# Patient Record
Sex: FEMALE | Race: WHITE | NOT HISPANIC OR LATINO | Employment: UNEMPLOYED | URBAN - NONMETROPOLITAN AREA
[De-identification: names, ages, dates, MRNs, and addresses within clinical notes are randomized per-mention and may not be internally consistent; named-entity substitution may affect disease eponyms.]

---

## 2019-12-27 ENCOUNTER — APPOINTMENT (OUTPATIENT)
Dept: CT IMAGING | Facility: HOSPITAL | Age: 49
End: 2019-12-27

## 2019-12-27 ENCOUNTER — HOSPITAL ENCOUNTER (EMERGENCY)
Facility: HOSPITAL | Age: 49
Discharge: HOME | End: 2019-12-27
Attending: EMERGENCY MEDICINE | Admitting: EMERGENCY MEDICINE

## 2019-12-27 ENCOUNTER — APPOINTMENT (OUTPATIENT)
Dept: GENERAL RADIOLOGY | Facility: HOSPITAL | Age: 49
End: 2019-12-27

## 2019-12-27 VITALS
TEMPERATURE: 98.9 F | OXYGEN SATURATION: 99 % | HEART RATE: 77 BPM | WEIGHT: 135 LBS | DIASTOLIC BLOOD PRESSURE: 87 MMHG | RESPIRATION RATE: 16 BRPM | HEIGHT: 66 IN | SYSTOLIC BLOOD PRESSURE: 134 MMHG | BODY MASS INDEX: 21.69 KG/M2

## 2019-12-27 DIAGNOSIS — I10 ESSENTIAL HYPERTENSION: Primary | ICD-10-CM

## 2019-12-27 LAB
ALBUMIN SERPL-MCNC: 4.37 G/DL (ref 3.5–5.2)
ALBUMIN/GLOB SERPL: 1.5 G/DL
ALP SERPL-CCNC: 88 U/L (ref 39–117)
ALT SERPL W P-5'-P-CCNC: 13 U/L (ref 1–33)
ANION GAP SERPL CALCULATED.3IONS-SCNC: 11.7 MMOL/L (ref 5–15)
AST SERPL-CCNC: 17 U/L (ref 1–32)
BASOPHILS # BLD AUTO: 0.05 10*3/MM3 (ref 0–0.2)
BASOPHILS NFR BLD AUTO: 0.5 % (ref 0–1.5)
BILIRUB SERPL-MCNC: 0.2 MG/DL (ref 0.2–1.2)
BUN BLD-MCNC: 17 MG/DL (ref 6–20)
BUN/CREAT SERPL: 21.3 (ref 7–25)
CALCIUM SPEC-SCNC: 9.5 MG/DL (ref 8.6–10.5)
CHLORIDE SERPL-SCNC: 104 MMOL/L (ref 98–107)
CO2 SERPL-SCNC: 25.3 MMOL/L (ref 22–29)
CREAT BLD-MCNC: 0.8 MG/DL (ref 0.57–1)
DEPRECATED RDW RBC AUTO: 45.1 FL (ref 37–54)
EOSINOPHIL # BLD AUTO: 0.12 10*3/MM3 (ref 0–0.4)
EOSINOPHIL NFR BLD AUTO: 1.1 % (ref 0.3–6.2)
ERYTHROCYTE [DISTWIDTH] IN BLOOD BY AUTOMATED COUNT: 12.5 % (ref 12.3–15.4)
GFR SERPL CREATININE-BSD FRML MDRD: 77 ML/MIN/1.73
GLOBULIN UR ELPH-MCNC: 2.8 GM/DL
GLUCOSE BLD-MCNC: 91 MG/DL (ref 65–99)
HCT VFR BLD AUTO: 44.7 % (ref 34–46.6)
HGB BLD-MCNC: 14.6 G/DL (ref 12–15.9)
IMM GRANULOCYTES # BLD AUTO: 0.04 10*3/MM3 (ref 0–0.05)
IMM GRANULOCYTES NFR BLD AUTO: 0.4 % (ref 0–0.5)
LYMPHOCYTES # BLD AUTO: 2.13 10*3/MM3 (ref 0.7–3.1)
LYMPHOCYTES NFR BLD AUTO: 20.2 % (ref 19.6–45.3)
MAGNESIUM SERPL-MCNC: 1.9 MG/DL (ref 1.6–2.6)
MCH RBC QN AUTO: 32 PG (ref 26.6–33)
MCHC RBC AUTO-ENTMCNC: 32.7 G/DL (ref 31.5–35.7)
MCV RBC AUTO: 98 FL (ref 79–97)
MONOCYTES # BLD AUTO: 0.58 10*3/MM3 (ref 0.1–0.9)
MONOCYTES NFR BLD AUTO: 5.5 % (ref 5–12)
NEUTROPHILS # BLD AUTO: 7.65 10*3/MM3 (ref 1.7–7)
NEUTROPHILS NFR BLD AUTO: 72.3 % (ref 42.7–76)
NRBC BLD AUTO-RTO: 0 /100 WBC (ref 0–0.2)
PLATELET # BLD AUTO: 225 10*3/MM3 (ref 140–450)
PMV BLD AUTO: 9.9 FL (ref 6–12)
POTASSIUM BLD-SCNC: 4.2 MMOL/L (ref 3.5–5.2)
PROT SERPL-MCNC: 7.2 G/DL (ref 6–8.5)
RBC # BLD AUTO: 4.56 10*6/MM3 (ref 3.77–5.28)
SODIUM BLD-SCNC: 141 MMOL/L (ref 136–145)
TROPONIN T SERPL-MCNC: <0.01 NG/ML (ref 0–0.03)
TSH SERPL DL<=0.05 MIU/L-ACNC: 0.81 UIU/ML (ref 0.27–4.2)
WBC NRBC COR # BLD: 10.57 10*3/MM3 (ref 3.4–10.8)

## 2019-12-27 PROCEDURE — 84443 ASSAY THYROID STIM HORMONE: CPT | Performed by: PHYSICIAN ASSISTANT

## 2019-12-27 PROCEDURE — 99284 EMERGENCY DEPT VISIT MOD MDM: CPT

## 2019-12-27 PROCEDURE — 85025 COMPLETE CBC W/AUTO DIFF WBC: CPT | Performed by: PHYSICIAN ASSISTANT

## 2019-12-27 PROCEDURE — 70450 CT HEAD/BRAIN W/O DYE: CPT | Performed by: RADIOLOGY

## 2019-12-27 PROCEDURE — 71045 X-RAY EXAM CHEST 1 VIEW: CPT | Performed by: RADIOLOGY

## 2019-12-27 PROCEDURE — 84484 ASSAY OF TROPONIN QUANT: CPT | Performed by: PHYSICIAN ASSISTANT

## 2019-12-27 PROCEDURE — 25010000002 KETOROLAC TROMETHAMINE PER 15 MG: Performed by: PHYSICIAN ASSISTANT

## 2019-12-27 PROCEDURE — 80053 COMPREHEN METABOLIC PANEL: CPT | Performed by: PHYSICIAN ASSISTANT

## 2019-12-27 PROCEDURE — 83735 ASSAY OF MAGNESIUM: CPT | Performed by: PHYSICIAN ASSISTANT

## 2019-12-27 PROCEDURE — 93005 ELECTROCARDIOGRAM TRACING: CPT | Performed by: PHYSICIAN ASSISTANT

## 2019-12-27 PROCEDURE — 93010 ELECTROCARDIOGRAM REPORT: CPT | Performed by: INTERNAL MEDICINE

## 2019-12-27 PROCEDURE — 71045 X-RAY EXAM CHEST 1 VIEW: CPT

## 2019-12-27 PROCEDURE — 70450 CT HEAD/BRAIN W/O DYE: CPT

## 2019-12-27 PROCEDURE — 96374 THER/PROPH/DIAG INJ IV PUSH: CPT

## 2019-12-27 RX ORDER — CLOPIDOGREL BISULFATE 75 MG/1
75 TABLET ORAL DAILY
Status: ON HOLD | COMMUNITY
End: 2023-02-14 | Stop reason: SDUPTHER

## 2019-12-27 RX ORDER — LISINOPRIL 5 MG/1
5 TABLET ORAL DAILY
COMMUNITY
End: 2019-12-27

## 2019-12-27 RX ORDER — SODIUM CHLORIDE 0.9 % (FLUSH) 0.9 %
10 SYRINGE (ML) INJECTION AS NEEDED
Status: DISCONTINUED | OUTPATIENT
Start: 2019-12-27 | End: 2019-12-27 | Stop reason: HOSPADM

## 2019-12-27 RX ORDER — LISINOPRIL 20 MG/1
20 TABLET ORAL DAILY
Qty: 30 TABLET | Refills: 0 | Status: ON HOLD | OUTPATIENT
Start: 2019-12-27 | End: 2023-01-25

## 2019-12-27 RX ORDER — KETOROLAC TROMETHAMINE 30 MG/ML
30 INJECTION, SOLUTION INTRAMUSCULAR; INTRAVENOUS ONCE
Status: COMPLETED | OUTPATIENT
Start: 2019-12-27 | End: 2019-12-27

## 2019-12-27 RX ADMIN — KETOROLAC TROMETHAMINE 30 MG: 30 INJECTION, SOLUTION INTRAMUSCULAR at 12:19

## 2021-07-09 ENCOUNTER — OUTSIDE FACILITY SERVICE (OUTPATIENT)
Dept: CARDIOLOGY | Facility: CLINIC | Age: 51
End: 2021-07-09

## 2021-07-09 PROCEDURE — 78452 HT MUSCLE IMAGE SPECT MULT: CPT | Performed by: INTERNAL MEDICINE

## 2021-07-09 PROCEDURE — 93018 CV STRESS TEST I&R ONLY: CPT | Performed by: INTERNAL MEDICINE

## 2023-01-25 ENCOUNTER — HOSPITAL ENCOUNTER (INPATIENT)
Facility: HOSPITAL | Age: 53
LOS: 20 days | Discharge: HOME | DRG: 885 | End: 2023-02-14
Attending: PSYCHIATRY & NEUROLOGY | Admitting: PSYCHIATRY & NEUROLOGY
Payer: MEDICARE

## 2023-01-25 DIAGNOSIS — F33.2 SEVERE EPISODE OF RECURRENT MAJOR DEPRESSIVE DISORDER, WITHOUT PSYCHOTIC FEATURES: Primary | ICD-10-CM

## 2023-01-25 PROBLEM — F32.9 MDD (MAJOR DEPRESSIVE DISORDER): Status: ACTIVE | Noted: 2023-01-25

## 2023-01-25 LAB
ALBUMIN SERPL-MCNC: 3.5 G/DL (ref 3.5–5.2)
ALBUMIN/GLOB SERPL: 1.3 G/DL
ALP SERPL-CCNC: 69 U/L (ref 39–117)
ALT SERPL W P-5'-P-CCNC: 8 U/L (ref 1–33)
ANION GAP SERPL CALCULATED.3IONS-SCNC: 8.3 MMOL/L (ref 5–15)
AST SERPL-CCNC: 10 U/L (ref 1–32)
BASOPHILS # BLD AUTO: 0.03 10*3/MM3 (ref 0–0.2)
BASOPHILS NFR BLD AUTO: 0.3 % (ref 0–1.5)
BILIRUB SERPL-MCNC: <0.2 MG/DL (ref 0–1.2)
BUN SERPL-MCNC: 24 MG/DL (ref 6–20)
BUN/CREAT SERPL: 31.2 (ref 7–25)
CALCIUM SPEC-SCNC: 9 MG/DL (ref 8.6–10.5)
CHLORIDE SERPL-SCNC: 105 MMOL/L (ref 98–107)
CO2 SERPL-SCNC: 24.7 MMOL/L (ref 22–29)
CREAT SERPL-MCNC: 0.77 MG/DL (ref 0.57–1)
DEPRECATED RDW RBC AUTO: 43.8 FL (ref 37–54)
EGFRCR SERPLBLD CKD-EPI 2021: 93.5 ML/MIN/1.73
EOSINOPHIL # BLD AUTO: 0.06 10*3/MM3 (ref 0–0.4)
EOSINOPHIL NFR BLD AUTO: 0.6 % (ref 0.3–6.2)
ERYTHROCYTE [DISTWIDTH] IN BLOOD BY AUTOMATED COUNT: 12.6 % (ref 12.3–15.4)
GLOBULIN UR ELPH-MCNC: 2.6 GM/DL
GLUCOSE SERPL-MCNC: 96 MG/DL (ref 65–99)
HCT VFR BLD AUTO: 35.6 % (ref 34–46.6)
HGB BLD-MCNC: 11.9 G/DL (ref 12–15.9)
IMM GRANULOCYTES # BLD AUTO: 0.02 10*3/MM3 (ref 0–0.05)
IMM GRANULOCYTES NFR BLD AUTO: 0.2 % (ref 0–0.5)
LYMPHOCYTES # BLD AUTO: 1.69 10*3/MM3 (ref 0.7–3.1)
LYMPHOCYTES NFR BLD AUTO: 18.1 % (ref 19.6–45.3)
MCH RBC QN AUTO: 31.7 PG (ref 26.6–33)
MCHC RBC AUTO-ENTMCNC: 33.4 G/DL (ref 31.5–35.7)
MCV RBC AUTO: 94.9 FL (ref 79–97)
MONOCYTES # BLD AUTO: 0.51 10*3/MM3 (ref 0.1–0.9)
MONOCYTES NFR BLD AUTO: 5.4 % (ref 5–12)
NEUTROPHILS NFR BLD AUTO: 7.05 10*3/MM3 (ref 1.7–7)
NEUTROPHILS NFR BLD AUTO: 75.4 % (ref 42.7–76)
NRBC BLD AUTO-RTO: 0 /100 WBC (ref 0–0.2)
PLATELET # BLD AUTO: 232 10*3/MM3 (ref 140–450)
PMV BLD AUTO: 9.1 FL (ref 6–12)
POTASSIUM SERPL-SCNC: 3.9 MMOL/L (ref 3.5–5.2)
PROT SERPL-MCNC: 6.1 G/DL (ref 6–8.5)
RBC # BLD AUTO: 3.75 10*6/MM3 (ref 3.77–5.28)
SODIUM SERPL-SCNC: 138 MMOL/L (ref 136–145)
WBC NRBC COR # BLD: 9.36 10*3/MM3 (ref 3.4–10.8)

## 2023-01-25 PROCEDURE — 93005 ELECTROCARDIOGRAM TRACING: CPT | Performed by: PSYCHIATRY & NEUROLOGY

## 2023-01-25 PROCEDURE — 93010 ELECTROCARDIOGRAM REPORT: CPT | Performed by: INTERNAL MEDICINE

## 2023-01-25 PROCEDURE — 80053 COMPREHEN METABOLIC PANEL: CPT | Performed by: PSYCHIATRY & NEUROLOGY

## 2023-01-25 PROCEDURE — 85025 COMPLETE CBC W/AUTO DIFF WBC: CPT | Performed by: PSYCHIATRY & NEUROLOGY

## 2023-01-25 RX ORDER — FAMOTIDINE 20 MG/1
20 TABLET, FILM COATED ORAL 2 TIMES DAILY PRN
Status: DISCONTINUED | OUTPATIENT
Start: 2023-01-25 | End: 2023-02-14 | Stop reason: HOSPADM

## 2023-01-25 RX ORDER — LISINOPRIL 10 MG/1
10 TABLET ORAL DAILY
Status: DISCONTINUED | OUTPATIENT
Start: 2023-01-26 | End: 2023-02-11

## 2023-01-25 RX ORDER — PAROXETINE 30 MG/1
60 TABLET, FILM COATED ORAL EVERY MORNING
Status: DISCONTINUED | OUTPATIENT
Start: 2023-01-26 | End: 2023-01-26

## 2023-01-25 RX ORDER — VENLAFAXINE HYDROCHLORIDE 75 MG/1
75 CAPSULE, EXTENDED RELEASE ORAL DAILY
Status: DISCONTINUED | OUTPATIENT
Start: 2023-01-26 | End: 2023-01-26

## 2023-01-25 RX ORDER — BENZTROPINE MESYLATE 1 MG/ML
1 INJECTION INTRAMUSCULAR; INTRAVENOUS ONCE AS NEEDED
Status: DISCONTINUED | OUTPATIENT
Start: 2023-01-25 | End: 2023-01-26

## 2023-01-25 RX ORDER — ACETAMINOPHEN 325 MG/1
650 TABLET ORAL EVERY 6 HOURS PRN
Status: DISCONTINUED | OUTPATIENT
Start: 2023-01-25 | End: 2023-02-14 | Stop reason: HOSPADM

## 2023-01-25 RX ORDER — ALUMINA, MAGNESIA, AND SIMETHICONE 2400; 2400; 240 MG/30ML; MG/30ML; MG/30ML
15 SUSPENSION ORAL EVERY 6 HOURS PRN
Status: DISCONTINUED | OUTPATIENT
Start: 2023-01-25 | End: 2023-02-14 | Stop reason: HOSPADM

## 2023-01-25 RX ORDER — HYDROXYZINE HYDROCHLORIDE 25 MG/1
50 TABLET, FILM COATED ORAL EVERY 6 HOURS PRN
Status: DISCONTINUED | OUTPATIENT
Start: 2023-01-25 | End: 2023-01-26

## 2023-01-25 RX ORDER — PAROXETINE 30 MG/1
60 TABLET, FILM COATED ORAL EVERY MORNING
COMMUNITY
End: 2023-02-14 | Stop reason: HOSPADM

## 2023-01-25 RX ORDER — BUSPIRONE HYDROCHLORIDE 10 MG/1
20 TABLET ORAL 2 TIMES DAILY
Status: DISCONTINUED | OUTPATIENT
Start: 2023-01-25 | End: 2023-01-26

## 2023-01-25 RX ORDER — VENLAFAXINE HYDROCHLORIDE 150 MG/1
150 CAPSULE, EXTENDED RELEASE ORAL DAILY
COMMUNITY
End: 2023-02-14 | Stop reason: HOSPADM

## 2023-01-25 RX ORDER — QUETIAPINE FUMARATE 200 MG/1
200 TABLET, FILM COATED ORAL 2 TIMES DAILY
COMMUNITY
End: 2023-02-14 | Stop reason: HOSPADM

## 2023-01-25 RX ORDER — LISINOPRIL 10 MG/1
10 TABLET ORAL DAILY
COMMUNITY
End: 2023-02-14 | Stop reason: HOSPADM

## 2023-01-25 RX ORDER — QUETIAPINE FUMARATE 50 MG/1
50 TABLET, FILM COATED ORAL NIGHTLY PRN
COMMUNITY
End: 2023-02-14 | Stop reason: HOSPADM

## 2023-01-25 RX ORDER — BENZONATATE 100 MG/1
100 CAPSULE ORAL 3 TIMES DAILY PRN
Status: DISCONTINUED | OUTPATIENT
Start: 2023-01-25 | End: 2023-02-14 | Stop reason: HOSPADM

## 2023-01-25 RX ORDER — NICOTINE 21 MG/24HR
1 PATCH, TRANSDERMAL 24 HOURS TRANSDERMAL
Status: DISCONTINUED | OUTPATIENT
Start: 2023-01-26 | End: 2023-02-14 | Stop reason: HOSPADM

## 2023-01-25 RX ORDER — MEGESTROL ACETATE 40 MG/ML
400 SUSPENSION ORAL DAILY
Status: DISCONTINUED | OUTPATIENT
Start: 2023-01-26 | End: 2023-02-14 | Stop reason: HOSPADM

## 2023-01-25 RX ORDER — VENLAFAXINE HYDROCHLORIDE 75 MG/1
75 CAPSULE, EXTENDED RELEASE ORAL DAILY
COMMUNITY
End: 2023-02-14 | Stop reason: HOSPADM

## 2023-01-25 RX ORDER — MIRTAZAPINE 15 MG/1
15 TABLET, FILM COATED ORAL NIGHTLY
Status: DISCONTINUED | OUTPATIENT
Start: 2023-01-25 | End: 2023-01-26

## 2023-01-25 RX ORDER — BENZTROPINE MESYLATE 1 MG/1
2 TABLET ORAL ONCE AS NEEDED
Status: DISCONTINUED | OUTPATIENT
Start: 2023-01-25 | End: 2023-01-26

## 2023-01-25 RX ORDER — ASPIRIN 81 MG/1
81 TABLET ORAL DAILY
Status: DISCONTINUED | OUTPATIENT
Start: 2023-01-26 | End: 2023-02-14 | Stop reason: HOSPADM

## 2023-01-25 RX ORDER — LOPERAMIDE HYDROCHLORIDE 2 MG/1
2 CAPSULE ORAL
Status: DISCONTINUED | OUTPATIENT
Start: 2023-01-25 | End: 2023-02-14 | Stop reason: HOSPADM

## 2023-01-25 RX ORDER — ASPIRIN 81 MG/1
81 TABLET ORAL DAILY
Status: ON HOLD | COMMUNITY
End: 2023-02-14 | Stop reason: SDUPTHER

## 2023-01-25 RX ORDER — ONDANSETRON 4 MG/1
4 TABLET, FILM COATED ORAL EVERY 6 HOURS PRN
Status: DISCONTINUED | OUTPATIENT
Start: 2023-01-25 | End: 2023-02-14 | Stop reason: HOSPADM

## 2023-01-25 RX ORDER — MIRTAZAPINE 15 MG/1
15 TABLET, FILM COATED ORAL NIGHTLY
COMMUNITY
End: 2023-02-14 | Stop reason: HOSPADM

## 2023-01-25 RX ORDER — MULTIPLE VITAMINS W/ MINERALS TAB 9MG-400MCG
1 TAB ORAL DAILY
Status: DISCONTINUED | OUTPATIENT
Start: 2023-01-26 | End: 2023-02-14 | Stop reason: HOSPADM

## 2023-01-25 RX ORDER — VENLAFAXINE HYDROCHLORIDE 150 MG/1
150 CAPSULE, EXTENDED RELEASE ORAL DAILY
Status: DISCONTINUED | OUTPATIENT
Start: 2023-01-26 | End: 2023-01-26

## 2023-01-25 RX ORDER — QUETIAPINE FUMARATE 100 MG/1
200 TABLET, FILM COATED ORAL 2 TIMES DAILY
Status: DISCONTINUED | OUTPATIENT
Start: 2023-01-25 | End: 2023-01-31

## 2023-01-25 RX ORDER — BUSPIRONE HYDROCHLORIDE 10 MG/1
20 TABLET ORAL 2 TIMES DAILY
COMMUNITY
End: 2023-02-14 | Stop reason: HOSPADM

## 2023-01-25 RX ORDER — QUETIAPINE FUMARATE 100 MG/1
50 TABLET, FILM COATED ORAL NIGHTLY PRN
Status: DISCONTINUED | OUTPATIENT
Start: 2023-01-25 | End: 2023-01-26

## 2023-01-25 RX ORDER — DIPHENHYDRAMINE HCL 25 MG
25 CAPSULE ORAL EVERY 6 HOURS PRN
COMMUNITY
End: 2023-02-14 | Stop reason: HOSPADM

## 2023-01-25 RX ORDER — ECHINACEA PURPUREA EXTRACT 125 MG
2 TABLET ORAL AS NEEDED
Status: DISCONTINUED | OUTPATIENT
Start: 2023-01-25 | End: 2023-02-14 | Stop reason: HOSPADM

## 2023-01-25 RX ORDER — MEGESTROL ACETATE 40 MG/ML
400 SUSPENSION ORAL DAILY
Status: ON HOLD | COMMUNITY
End: 2023-02-14 | Stop reason: SDUPTHER

## 2023-01-25 RX ORDER — DIPHENHYDRAMINE HCL 25 MG
25 CAPSULE ORAL EVERY 6 HOURS PRN
Status: CANCELLED | OUTPATIENT
Start: 2023-01-25

## 2023-01-25 RX ORDER — IBUPROFEN 400 MG/1
400 TABLET ORAL EVERY 6 HOURS PRN
Status: DISCONTINUED | OUTPATIENT
Start: 2023-01-25 | End: 2023-02-14 | Stop reason: HOSPADM

## 2023-01-25 RX ORDER — CLOPIDOGREL BISULFATE 75 MG/1
75 TABLET ORAL DAILY
Status: DISCONTINUED | OUTPATIENT
Start: 2023-01-26 | End: 2023-01-27

## 2023-01-25 RX ORDER — TRAZODONE HYDROCHLORIDE 50 MG/1
50 TABLET ORAL NIGHTLY PRN
Status: DISCONTINUED | OUTPATIENT
Start: 2023-01-25 | End: 2023-01-26

## 2023-01-26 ENCOUNTER — APPOINTMENT (OUTPATIENT)
Dept: GENERAL RADIOLOGY | Facility: HOSPITAL | Age: 53
DRG: 885 | End: 2023-01-26
Payer: MEDICARE

## 2023-01-26 ENCOUNTER — APPOINTMENT (OUTPATIENT)
Dept: CT IMAGING | Facility: HOSPITAL | Age: 53
DRG: 885 | End: 2023-01-26
Payer: MEDICARE

## 2023-01-26 PROBLEM — F33.9 MAJOR DEPRESSIVE DISORDER, RECURRENT (HCC): Status: ACTIVE | Noted: 2023-01-25

## 2023-01-26 LAB
ALBUMIN SERPL-MCNC: 3.4 G/DL (ref 3.5–5.2)
ALBUMIN/GLOB SERPL: 1.4 G/DL
ALP SERPL-CCNC: 76 U/L (ref 39–117)
ALT SERPL W P-5'-P-CCNC: 6 U/L (ref 1–33)
AMPHET+METHAMPHET UR QL: NEGATIVE
AMPHETAMINES UR QL: NEGATIVE
ANION GAP SERPL CALCULATED.3IONS-SCNC: 8.4 MMOL/L (ref 5–15)
AST SERPL-CCNC: 13 U/L (ref 1–32)
B-HCG UR QL: NEGATIVE
BACTERIA UR QL AUTO: ABNORMAL /HPF
BARBITURATES UR QL SCN: NEGATIVE
BASOPHILS # BLD AUTO: 0.03 10*3/MM3 (ref 0–0.2)
BASOPHILS NFR BLD AUTO: 0.4 % (ref 0–1.5)
BENZODIAZ UR QL SCN: NEGATIVE
BILIRUB SERPL-MCNC: 0.2 MG/DL (ref 0–1.2)
BILIRUB UR QL STRIP: NEGATIVE
BUN SERPL-MCNC: 18 MG/DL (ref 6–20)
BUN/CREAT SERPL: 27.3 (ref 7–25)
BUPRENORPHINE SERPL-MCNC: NEGATIVE NG/ML
CALCIUM SPEC-SCNC: 9.1 MG/DL (ref 8.6–10.5)
CANNABINOIDS SERPL QL: NEGATIVE
CHLORIDE SERPL-SCNC: 105 MMOL/L (ref 98–107)
CLARITY UR: CLEAR
CO2 SERPL-SCNC: 25.6 MMOL/L (ref 22–29)
COCAINE UR QL: NEGATIVE
COLOR UR: YELLOW
CREAT SERPL-MCNC: 0.66 MG/DL (ref 0.57–1)
DEPRECATED RDW RBC AUTO: 43.1 FL (ref 37–54)
EGFRCR SERPLBLD CKD-EPI 2021: 106.4 ML/MIN/1.73
EOSINOPHIL # BLD AUTO: 0.02 10*3/MM3 (ref 0–0.4)
EOSINOPHIL NFR BLD AUTO: 0.2 % (ref 0.3–6.2)
ERYTHROCYTE [DISTWIDTH] IN BLOOD BY AUTOMATED COUNT: 12.5 % (ref 12.3–15.4)
GLOBULIN UR ELPH-MCNC: 2.5 GM/DL
GLUCOSE SERPL-MCNC: 92 MG/DL (ref 65–99)
GLUCOSE UR STRIP-MCNC: NEGATIVE MG/DL
HCT VFR BLD AUTO: 38 % (ref 34–46.6)
HGB BLD-MCNC: 12.7 G/DL (ref 12–15.9)
HGB UR QL STRIP.AUTO: ABNORMAL
HYALINE CASTS UR QL AUTO: ABNORMAL /LPF
IMM GRANULOCYTES # BLD AUTO: 0.03 10*3/MM3 (ref 0–0.05)
IMM GRANULOCYTES NFR BLD AUTO: 0.4 % (ref 0–0.5)
KETONES UR QL STRIP: NEGATIVE
LEUKOCYTE ESTERASE UR QL STRIP.AUTO: ABNORMAL
LYMPHOCYTES # BLD AUTO: 1.51 10*3/MM3 (ref 0.7–3.1)
LYMPHOCYTES NFR BLD AUTO: 17.9 % (ref 19.6–45.3)
MAGNESIUM SERPL-MCNC: 1.8 MG/DL (ref 1.6–2.6)
MCH RBC QN AUTO: 31.3 PG (ref 26.6–33)
MCHC RBC AUTO-ENTMCNC: 33.4 G/DL (ref 31.5–35.7)
MCV RBC AUTO: 93.6 FL (ref 79–97)
METHADONE UR QL SCN: NEGATIVE
MONOCYTES # BLD AUTO: 0.37 10*3/MM3 (ref 0.1–0.9)
MONOCYTES NFR BLD AUTO: 4.4 % (ref 5–12)
NEUTROPHILS NFR BLD AUTO: 6.47 10*3/MM3 (ref 1.7–7)
NEUTROPHILS NFR BLD AUTO: 76.7 % (ref 42.7–76)
NITRITE UR QL STRIP: NEGATIVE
NRBC BLD AUTO-RTO: 0 /100 WBC (ref 0–0.2)
OPIATES UR QL: NEGATIVE
OXYCODONE UR QL SCN: NEGATIVE
PCP UR QL SCN: NEGATIVE
PH UR STRIP.AUTO: 7.5 [PH] (ref 5–8)
PLATELET # BLD AUTO: 239 10*3/MM3 (ref 140–450)
PMV BLD AUTO: 9.3 FL (ref 6–12)
POTASSIUM SERPL-SCNC: 3.8 MMOL/L (ref 3.5–5.2)
PROPOXYPH UR QL: NEGATIVE
PROT SERPL-MCNC: 5.9 G/DL (ref 6–8.5)
PROT UR QL STRIP: NEGATIVE
RBC # BLD AUTO: 4.06 10*6/MM3 (ref 3.77–5.28)
RBC # UR STRIP: ABNORMAL /HPF
REF LAB TEST METHOD: ABNORMAL
SODIUM SERPL-SCNC: 139 MMOL/L (ref 136–145)
SP GR UR STRIP: >1.03 (ref 1–1.03)
SQUAMOUS #/AREA URNS HPF: ABNORMAL /HPF
T4 FREE SERPL-MCNC: 0.93 NG/DL (ref 0.93–1.7)
TRICYCLICS UR QL SCN: NEGATIVE
TSH SERPL DL<=0.05 MIU/L-ACNC: 1.55 UIU/ML (ref 0.27–4.2)
UROBILINOGEN UR QL STRIP: ABNORMAL
WBC # UR STRIP: ABNORMAL /HPF
WBC NRBC COR # BLD: 8.43 10*3/MM3 (ref 3.4–10.8)

## 2023-01-26 PROCEDURE — 72020 X-RAY EXAM OF SPINE 1 VIEW: CPT | Performed by: RADIOLOGY

## 2023-01-26 PROCEDURE — 87086 URINE CULTURE/COLONY COUNT: CPT | Performed by: PHYSICIAN ASSISTANT

## 2023-01-26 PROCEDURE — 70470 CT HEAD/BRAIN W/O & W/DYE: CPT | Performed by: RADIOLOGY

## 2023-01-26 PROCEDURE — 87088 URINE BACTERIA CULTURE: CPT | Performed by: PHYSICIAN ASSISTANT

## 2023-01-26 PROCEDURE — 80306 DRUG TEST PRSMV INSTRMNT: CPT | Performed by: PHYSICIAN ASSISTANT

## 2023-01-26 PROCEDURE — 80053 COMPREHEN METABOLIC PANEL: CPT | Performed by: PHYSICIAN ASSISTANT

## 2023-01-26 PROCEDURE — 84439 ASSAY OF FREE THYROXINE: CPT | Performed by: PSYCHIATRY & NEUROLOGY

## 2023-01-26 PROCEDURE — 72020 X-RAY EXAM OF SPINE 1 VIEW: CPT

## 2023-01-26 PROCEDURE — 81001 URINALYSIS AUTO W/SCOPE: CPT | Performed by: PSYCHIATRY & NEUROLOGY

## 2023-01-26 PROCEDURE — 71045 X-RAY EXAM CHEST 1 VIEW: CPT | Performed by: RADIOLOGY

## 2023-01-26 PROCEDURE — 70470 CT HEAD/BRAIN W/O & W/DYE: CPT

## 2023-01-26 PROCEDURE — 84443 ASSAY THYROID STIM HORMONE: CPT | Performed by: PSYCHIATRY & NEUROLOGY

## 2023-01-26 PROCEDURE — 87186 SC STD MICRODIL/AGAR DIL: CPT | Performed by: PHYSICIAN ASSISTANT

## 2023-01-26 PROCEDURE — 71045 X-RAY EXAM CHEST 1 VIEW: CPT

## 2023-01-26 PROCEDURE — 93005 ELECTROCARDIOGRAM TRACING: CPT | Performed by: PSYCHIATRY & NEUROLOGY

## 2023-01-26 PROCEDURE — 99223 1ST HOSP IP/OBS HIGH 75: CPT | Performed by: PSYCHIATRY & NEUROLOGY

## 2023-01-26 PROCEDURE — 85025 COMPLETE CBC W/AUTO DIFF WBC: CPT | Performed by: PHYSICIAN ASSISTANT

## 2023-01-26 PROCEDURE — 99233 SBSQ HOSP IP/OBS HIGH 50: CPT | Performed by: PHYSICIAN ASSISTANT

## 2023-01-26 PROCEDURE — 83735 ASSAY OF MAGNESIUM: CPT | Performed by: PHYSICIAN ASSISTANT

## 2023-01-26 PROCEDURE — 81025 URINE PREGNANCY TEST: CPT | Performed by: PSYCHIATRY & NEUROLOGY

## 2023-01-26 PROCEDURE — 25010000002 IOPAMIDOL 61 % SOLUTION: Performed by: PSYCHIATRY & NEUROLOGY

## 2023-01-26 PROCEDURE — 93010 ELECTROCARDIOGRAM REPORT: CPT | Performed by: INTERNAL MEDICINE

## 2023-01-26 RX ORDER — BUSPIRONE HYDROCHLORIDE 5 MG/1
7.5 TABLET ORAL 2 TIMES DAILY
Status: DISCONTINUED | OUTPATIENT
Start: 2023-01-26 | End: 2023-01-30

## 2023-01-26 RX ORDER — PAROXETINE HYDROCHLORIDE 20 MG/1
20 TABLET, FILM COATED ORAL EVERY MORNING
Status: DISCONTINUED | OUTPATIENT
Start: 2023-01-27 | End: 2023-01-30

## 2023-01-26 RX ORDER — VENLAFAXINE HYDROCHLORIDE 150 MG/1
150 CAPSULE, EXTENDED RELEASE ORAL DAILY
Status: DISCONTINUED | OUTPATIENT
Start: 2023-01-27 | End: 2023-01-30

## 2023-01-26 RX ORDER — SULFAMETHOXAZOLE AND TRIMETHOPRIM 800; 160 MG/1; MG/1
1 TABLET ORAL EVERY 12 HOURS SCHEDULED
Status: DISCONTINUED | OUTPATIENT
Start: 2023-01-26 | End: 2023-01-28

## 2023-01-26 RX ORDER — MIRTAZAPINE 15 MG/1
30 TABLET, FILM COATED ORAL NIGHTLY
Status: DISCONTINUED | OUTPATIENT
Start: 2023-01-26 | End: 2023-02-09

## 2023-01-26 RX ORDER — HYDROXYZINE HYDROCHLORIDE 25 MG/1
25 TABLET, FILM COATED ORAL EVERY 6 HOURS PRN
Status: DISCONTINUED | OUTPATIENT
Start: 2023-01-26 | End: 2023-02-14 | Stop reason: HOSPADM

## 2023-01-26 RX ADMIN — SULFAMETHOXAZOLE AND TRIMETHOPRIM 1 TABLET: 800; 160 TABLET ORAL at 20:46

## 2023-01-26 RX ADMIN — QUETIAPINE FUMARATE 200 MG: 100 TABLET ORAL at 00:02

## 2023-01-26 RX ADMIN — QUETIAPINE FUMARATE 200 MG: 100 TABLET ORAL at 09:00

## 2023-01-26 RX ADMIN — SULFAMETHOXAZOLE AND TRIMETHOPRIM 1 TABLET: 800; 160 TABLET ORAL at 14:17

## 2023-01-26 RX ADMIN — BUSPIRONE HYDROCHLORIDE 20 MG: 10 TABLET ORAL at 00:02

## 2023-01-26 RX ADMIN — PAROXETINE HYDROCHLORIDE 60 MG: 30 TABLET, FILM COATED ORAL at 09:00

## 2023-01-26 RX ADMIN — Medication 1 TABLET: at 09:00

## 2023-01-26 RX ADMIN — MEGESTROL ACETATE 400 MG: 40 SUSPENSION ORAL at 09:00

## 2023-01-26 RX ADMIN — VENLAFAXINE HYDROCHLORIDE 150 MG: 150 CAPSULE, EXTENDED RELEASE ORAL at 09:01

## 2023-01-26 RX ADMIN — MIRTAZAPINE 15 MG: 15 TABLET, FILM COATED ORAL at 00:02

## 2023-01-26 RX ADMIN — LISINOPRIL 10 MG: 10 TABLET ORAL at 09:00

## 2023-01-26 RX ADMIN — CLOPIDOGREL 75 MG: 75 TABLET, FILM COATED ORAL at 09:01

## 2023-01-26 RX ADMIN — ASPIRIN 81 MG: 81 TABLET, COATED ORAL at 09:01

## 2023-01-26 RX ADMIN — IOPAMIDOL 80 ML: 612 INJECTION, SOLUTION INTRAVENOUS at 08:51

## 2023-01-26 RX ADMIN — BUSPIRONE HYDROCHLORIDE 20 MG: 10 TABLET ORAL at 09:00

## 2023-01-26 RX ADMIN — BUSPIRONE HYDROCHLORIDE 7.5 MG: 5 TABLET ORAL at 20:46

## 2023-01-26 RX ADMIN — VENLAFAXINE HYDROCHLORIDE 75 MG: 75 CAPSULE, EXTENDED RELEASE ORAL at 09:00

## 2023-01-26 RX ADMIN — QUETIAPINE FUMARATE 200 MG: 100 TABLET ORAL at 20:47

## 2023-01-26 RX ADMIN — MIRTAZAPINE 30 MG: 15 TABLET, FILM COATED ORAL at 20:47

## 2023-01-27 LAB
ALBUMIN SERPL-MCNC: 3.5 G/DL (ref 3.5–5.2)
ALBUMIN/GLOB SERPL: 1.5 G/DL
ALP SERPL-CCNC: 78 U/L (ref 39–117)
ALT SERPL W P-5'-P-CCNC: 10 U/L (ref 1–33)
ANION GAP SERPL CALCULATED.3IONS-SCNC: 8.1 MMOL/L (ref 5–15)
AST SERPL-CCNC: 12 U/L (ref 1–32)
BILIRUB SERPL-MCNC: 0.2 MG/DL (ref 0–1.2)
BUN SERPL-MCNC: 17 MG/DL (ref 6–20)
BUN/CREAT SERPL: 25 (ref 7–25)
CALCIUM SPEC-SCNC: 8.7 MG/DL (ref 8.6–10.5)
CHLORIDE SERPL-SCNC: 106 MMOL/L (ref 98–107)
CO2 SERPL-SCNC: 23.9 MMOL/L (ref 22–29)
CREAT SERPL-MCNC: 0.68 MG/DL (ref 0.57–1)
EGFRCR SERPLBLD CKD-EPI 2021: 105.6 ML/MIN/1.73
GLOBULIN UR ELPH-MCNC: 2.4 GM/DL
GLUCOSE SERPL-MCNC: 134 MG/DL (ref 65–99)
POTASSIUM SERPL-SCNC: 3.7 MMOL/L (ref 3.5–5.2)
PROT SERPL-MCNC: 5.9 G/DL (ref 6–8.5)
SODIUM SERPL-SCNC: 138 MMOL/L (ref 136–145)

## 2023-01-27 PROCEDURE — 99232 SBSQ HOSP IP/OBS MODERATE 35: CPT | Performed by: PSYCHIATRY & NEUROLOGY

## 2023-01-27 PROCEDURE — 80053 COMPREHEN METABOLIC PANEL: CPT | Performed by: PSYCHIATRY & NEUROLOGY

## 2023-01-27 RX ORDER — CLOPIDOGREL BISULFATE 75 MG/1
75 TABLET ORAL DAILY
Status: DISCONTINUED | OUTPATIENT
Start: 2023-01-27 | End: 2023-02-14 | Stop reason: HOSPADM

## 2023-01-27 RX ADMIN — MEGESTROL ACETATE 400 MG: 40 SUSPENSION ORAL at 09:13

## 2023-01-27 RX ADMIN — CLOPIDOGREL 75 MG: 75 TABLET, FILM COATED ORAL at 08:39

## 2023-01-27 RX ADMIN — SULFAMETHOXAZOLE AND TRIMETHOPRIM 1 TABLET: 800; 160 TABLET ORAL at 08:39

## 2023-01-27 RX ADMIN — SULFAMETHOXAZOLE AND TRIMETHOPRIM 1 TABLET: 800; 160 TABLET ORAL at 20:27

## 2023-01-27 RX ADMIN — BUSPIRONE HYDROCHLORIDE 7.5 MG: 5 TABLET ORAL at 20:24

## 2023-01-27 RX ADMIN — MIRTAZAPINE 30 MG: 15 TABLET, FILM COATED ORAL at 20:24

## 2023-01-27 RX ADMIN — BUSPIRONE HYDROCHLORIDE 7.5 MG: 5 TABLET ORAL at 08:39

## 2023-01-27 RX ADMIN — QUETIAPINE FUMARATE 200 MG: 100 TABLET ORAL at 20:24

## 2023-01-27 RX ADMIN — VENLAFAXINE HYDROCHLORIDE 150 MG: 150 CAPSULE, EXTENDED RELEASE ORAL at 08:39

## 2023-01-27 RX ADMIN — PAROXETINE HYDROCHLORIDE 20 MG: 20 TABLET, FILM COATED ORAL at 09:03

## 2023-01-27 RX ADMIN — LISINOPRIL 10 MG: 10 TABLET ORAL at 08:40

## 2023-01-27 RX ADMIN — QUETIAPINE FUMARATE 200 MG: 100 TABLET ORAL at 08:39

## 2023-01-27 RX ADMIN — ASPIRIN 81 MG: 81 TABLET, COATED ORAL at 08:39

## 2023-01-27 RX ADMIN — Medication 1 TABLET: at 08:40

## 2023-01-28 LAB — BACTERIA SPEC AEROBE CULT: ABNORMAL

## 2023-01-28 PROCEDURE — 99232 SBSQ HOSP IP/OBS MODERATE 35: CPT | Performed by: PSYCHIATRY & NEUROLOGY

## 2023-01-28 RX ORDER — NITROFURANTOIN 25; 75 MG/1; MG/1
100 CAPSULE ORAL EVERY 12 HOURS SCHEDULED
Status: DISPENSED | OUTPATIENT
Start: 2023-01-28 | End: 2023-02-04

## 2023-01-28 RX ADMIN — ASPIRIN 81 MG: 81 TABLET, COATED ORAL at 09:20

## 2023-01-28 RX ADMIN — SULFAMETHOXAZOLE AND TRIMETHOPRIM 1 TABLET: 800; 160 TABLET ORAL at 09:20

## 2023-01-28 RX ADMIN — PAROXETINE HYDROCHLORIDE 20 MG: 20 TABLET, FILM COATED ORAL at 10:50

## 2023-01-28 RX ADMIN — BUSPIRONE HYDROCHLORIDE 7.5 MG: 5 TABLET ORAL at 09:22

## 2023-01-28 RX ADMIN — CLOPIDOGREL BISULFATE 75 MG: 75 TABLET, FILM COATED ORAL at 09:20

## 2023-01-28 RX ADMIN — Medication 1 TABLET: at 09:22

## 2023-01-28 RX ADMIN — MEGESTROL ACETATE 400 MG: 40 SUSPENSION ORAL at 10:50

## 2023-01-28 RX ADMIN — NITROFURANTOIN MONOHYDRATE/MACROCRYSTALLINE 100 MG: 25; 75 CAPSULE ORAL at 15:19

## 2023-01-28 RX ADMIN — VENLAFAXINE HYDROCHLORIDE 150 MG: 150 CAPSULE, EXTENDED RELEASE ORAL at 09:20

## 2023-01-28 RX ADMIN — QUETIAPINE FUMARATE 200 MG: 100 TABLET ORAL at 09:22

## 2023-01-28 RX ADMIN — LISINOPRIL 10 MG: 10 TABLET ORAL at 09:22

## 2023-01-29 LAB
QT INTERVAL: 372 MS
QT INTERVAL: 376 MS
QTC INTERVAL: 407 MS
QTC INTERVAL: 414 MS

## 2023-01-29 PROCEDURE — 99232 SBSQ HOSP IP/OBS MODERATE 35: CPT | Performed by: PSYCHIATRY & NEUROLOGY

## 2023-01-29 RX ADMIN — NITROFURANTOIN MONOHYDRATE/MACROCRYSTALLINE 100 MG: 25; 75 CAPSULE ORAL at 09:24

## 2023-01-29 RX ADMIN — BUSPIRONE HYDROCHLORIDE 7.5 MG: 5 TABLET ORAL at 21:20

## 2023-01-29 RX ADMIN — LISINOPRIL 10 MG: 10 TABLET ORAL at 09:24

## 2023-01-29 RX ADMIN — BUSPIRONE HYDROCHLORIDE 7.5 MG: 5 TABLET ORAL at 09:24

## 2023-01-29 RX ADMIN — NITROFURANTOIN MONOHYDRATE/MACROCRYSTALLINE 100 MG: 25; 75 CAPSULE ORAL at 21:20

## 2023-01-29 RX ADMIN — Medication 1 TABLET: at 09:24

## 2023-01-29 RX ADMIN — ASPIRIN 81 MG: 81 TABLET, COATED ORAL at 09:24

## 2023-01-29 RX ADMIN — PAROXETINE HYDROCHLORIDE 20 MG: 20 TABLET, FILM COATED ORAL at 06:20

## 2023-01-29 RX ADMIN — QUETIAPINE FUMARATE 200 MG: 100 TABLET ORAL at 09:24

## 2023-01-29 RX ADMIN — CLOPIDOGREL BISULFATE 75 MG: 75 TABLET, FILM COATED ORAL at 09:24

## 2023-01-29 RX ADMIN — VENLAFAXINE HYDROCHLORIDE 150 MG: 150 CAPSULE, EXTENDED RELEASE ORAL at 09:25

## 2023-01-29 RX ADMIN — MEGESTROL ACETATE 400 MG: 40 SUSPENSION ORAL at 09:24

## 2023-01-30 PROCEDURE — 99232 SBSQ HOSP IP/OBS MODERATE 35: CPT | Performed by: PSYCHIATRY & NEUROLOGY

## 2023-01-30 RX ORDER — PAROXETINE HYDROCHLORIDE 20 MG/1
10 TABLET, FILM COATED ORAL EVERY MORNING
Status: COMPLETED | OUTPATIENT
Start: 2023-01-31 | End: 2023-02-01

## 2023-01-30 RX ORDER — VENLAFAXINE HYDROCHLORIDE 75 MG/1
75 CAPSULE, EXTENDED RELEASE ORAL DAILY
Status: COMPLETED | OUTPATIENT
Start: 2023-01-30 | End: 2023-01-31

## 2023-01-30 RX ADMIN — MEGESTROL ACETATE 400 MG: 40 SUSPENSION ORAL at 09:13

## 2023-01-30 RX ADMIN — LISINOPRIL 10 MG: 10 TABLET ORAL at 09:11

## 2023-01-30 RX ADMIN — NITROFURANTOIN MONOHYDRATE/MACROCRYSTALLINE 100 MG: 25; 75 CAPSULE ORAL at 09:11

## 2023-01-30 RX ADMIN — PAROXETINE HYDROCHLORIDE 20 MG: 20 TABLET, FILM COATED ORAL at 09:12

## 2023-01-30 RX ADMIN — QUETIAPINE FUMARATE 200 MG: 100 TABLET ORAL at 09:11

## 2023-01-30 RX ADMIN — Medication 1 TABLET: at 09:11

## 2023-01-30 RX ADMIN — NITROFURANTOIN MONOHYDRATE/MACROCRYSTALLINE 100 MG: 25; 75 CAPSULE ORAL at 20:46

## 2023-01-30 RX ADMIN — VENLAFAXINE HYDROCHLORIDE 75 MG: 75 CAPSULE, EXTENDED RELEASE ORAL at 09:11

## 2023-01-30 RX ADMIN — MIRTAZAPINE 30 MG: 15 TABLET, FILM COATED ORAL at 20:46

## 2023-01-30 RX ADMIN — CLOPIDOGREL BISULFATE 75 MG: 75 TABLET, FILM COATED ORAL at 09:12

## 2023-01-30 RX ADMIN — QUETIAPINE FUMARATE 200 MG: 100 TABLET ORAL at 20:46

## 2023-01-30 RX ADMIN — ASPIRIN 81 MG: 81 TABLET, COATED ORAL at 09:12

## 2023-01-31 PROCEDURE — 99232 SBSQ HOSP IP/OBS MODERATE 35: CPT | Performed by: PSYCHIATRY & NEUROLOGY

## 2023-01-31 RX ORDER — SODIUM CHLORIDE 0.9 % (FLUSH) 0.9 %
10 SYRINGE (ML) INJECTION EVERY 12 HOURS SCHEDULED
Status: DISCONTINUED | OUTPATIENT
Start: 2023-01-31 | End: 2023-02-01 | Stop reason: HOSPADM

## 2023-01-31 RX ORDER — SODIUM CHLORIDE 0.9 % (FLUSH) 0.9 %
10 SYRINGE (ML) INJECTION AS NEEDED
Status: DISCONTINUED | OUTPATIENT
Start: 2023-01-31 | End: 2023-02-01 | Stop reason: HOSPADM

## 2023-01-31 RX ORDER — QUETIAPINE FUMARATE 100 MG/1
200 TABLET, FILM COATED ORAL NIGHTLY
Status: DISCONTINUED | OUTPATIENT
Start: 2023-01-31 | End: 2023-02-02

## 2023-01-31 RX ORDER — SODIUM CHLORIDE 9 MG/ML
40 INJECTION, SOLUTION INTRAVENOUS AS NEEDED
Status: DISCONTINUED | OUTPATIENT
Start: 2023-01-31 | End: 2023-02-01 | Stop reason: HOSPADM

## 2023-01-31 RX ADMIN — Medication 10 ML: at 20:52

## 2023-01-31 RX ADMIN — NITROFURANTOIN MONOHYDRATE/MACROCRYSTALLINE 100 MG: 25; 75 CAPSULE ORAL at 20:45

## 2023-01-31 RX ADMIN — LISINOPRIL 10 MG: 10 TABLET ORAL at 08:51

## 2023-01-31 RX ADMIN — Medication 1 TABLET: at 08:51

## 2023-01-31 RX ADMIN — VENLAFAXINE HYDROCHLORIDE 75 MG: 75 CAPSULE, EXTENDED RELEASE ORAL at 08:52

## 2023-01-31 RX ADMIN — NITROFURANTOIN MONOHYDRATE/MACROCRYSTALLINE 100 MG: 25; 75 CAPSULE ORAL at 08:51

## 2023-01-31 RX ADMIN — QUETIAPINE FUMARATE 200 MG: 100 TABLET ORAL at 08:51

## 2023-01-31 RX ADMIN — CLOPIDOGREL BISULFATE 75 MG: 75 TABLET, FILM COATED ORAL at 08:52

## 2023-01-31 RX ADMIN — PAROXETINE HYDROCHLORIDE 10 MG: 20 TABLET, FILM COATED ORAL at 06:14

## 2023-01-31 RX ADMIN — ASPIRIN 81 MG: 81 TABLET, COATED ORAL at 08:52

## 2023-01-31 RX ADMIN — MEGESTROL ACETATE 400 MG: 40 SUSPENSION ORAL at 08:52

## 2023-02-01 ENCOUNTER — ANESTHESIA EVENT (OUTPATIENT)
Dept: PERIOP | Facility: HOSPITAL | Age: 53
DRG: 885 | End: 2023-02-01
Payer: MEDICARE

## 2023-02-01 ENCOUNTER — ANESTHESIA (OUTPATIENT)
Dept: PERIOP | Facility: HOSPITAL | Age: 53
DRG: 885 | End: 2023-02-01
Payer: MEDICARE

## 2023-02-01 PROCEDURE — GZB2ZZZ ELECTROCONVULSIVE THERAPY, BILATERAL-SINGLE SEIZURE: ICD-10-PCS | Performed by: PSYCHIATRY & NEUROLOGY

## 2023-02-01 PROCEDURE — 90870 ELECTROCONVULSIVE THERAPY: CPT | Performed by: PSYCHIATRY & NEUROLOGY

## 2023-02-01 PROCEDURE — 25010000002 ONDANSETRON PER 1 MG: Performed by: NURSE ANESTHETIST, CERTIFIED REGISTERED

## 2023-02-01 PROCEDURE — 25010000002 SUCCINYLCHOLINE PER 20 MG: Performed by: NURSE ANESTHETIST, CERTIFIED REGISTERED

## 2023-02-01 PROCEDURE — 25010000002 MIDAZOLAM PER 1MG: Performed by: NURSE ANESTHETIST, CERTIFIED REGISTERED

## 2023-02-01 RX ORDER — SODIUM CHLORIDE, SODIUM LACTATE, POTASSIUM CHLORIDE, CALCIUM CHLORIDE 600; 310; 30; 20 MG/100ML; MG/100ML; MG/100ML; MG/100ML
100 INJECTION, SOLUTION INTRAVENOUS ONCE AS NEEDED
Status: DISCONTINUED | OUTPATIENT
Start: 2023-02-01 | End: 2023-02-01 | Stop reason: HOSPADM

## 2023-02-01 RX ORDER — MEPERIDINE HYDROCHLORIDE 50 MG/ML
12.5 INJECTION INTRAMUSCULAR; INTRAVENOUS; SUBCUTANEOUS
Status: DISCONTINUED | OUTPATIENT
Start: 2023-02-01 | End: 2023-02-01 | Stop reason: HOSPADM

## 2023-02-01 RX ORDER — OXYCODONE HYDROCHLORIDE AND ACETAMINOPHEN 5; 325 MG/1; MG/1
1 TABLET ORAL ONCE AS NEEDED
Status: DISCONTINUED | OUTPATIENT
Start: 2023-02-01 | End: 2023-02-01 | Stop reason: HOSPADM

## 2023-02-01 RX ORDER — SUCCINYLCHOLINE CHLORIDE 20 MG/ML
INJECTION INTRAMUSCULAR; INTRAVENOUS AS NEEDED
Status: DISCONTINUED | OUTPATIENT
Start: 2023-02-01 | End: 2023-02-01 | Stop reason: SURG

## 2023-02-01 RX ORDER — SODIUM CHLORIDE, SODIUM LACTATE, POTASSIUM CHLORIDE, CALCIUM CHLORIDE 600; 310; 30; 20 MG/100ML; MG/100ML; MG/100ML; MG/100ML
125 INJECTION, SOLUTION INTRAVENOUS ONCE
Status: DISCONTINUED | OUTPATIENT
Start: 2023-02-01 | End: 2023-02-01 | Stop reason: HOSPADM

## 2023-02-01 RX ORDER — ONDANSETRON 2 MG/ML
INJECTION INTRAMUSCULAR; INTRAVENOUS AS NEEDED
Status: DISCONTINUED | OUTPATIENT
Start: 2023-02-01 | End: 2023-02-01 | Stop reason: SURG

## 2023-02-01 RX ORDER — ONDANSETRON 2 MG/ML
4 INJECTION INTRAMUSCULAR; INTRAVENOUS AS NEEDED
Status: DISCONTINUED | OUTPATIENT
Start: 2023-02-01 | End: 2023-02-01 | Stop reason: HOSPADM

## 2023-02-01 RX ORDER — KETOROLAC TROMETHAMINE 30 MG/ML
30 INJECTION, SOLUTION INTRAMUSCULAR; INTRAVENOUS EVERY 6 HOURS PRN
Status: DISCONTINUED | OUTPATIENT
Start: 2023-02-01 | End: 2023-02-01 | Stop reason: HOSPADM

## 2023-02-01 RX ORDER — SODIUM CHLORIDE, SODIUM LACTATE, POTASSIUM CHLORIDE, CALCIUM CHLORIDE 600; 310; 30; 20 MG/100ML; MG/100ML; MG/100ML; MG/100ML
INJECTION, SOLUTION INTRAVENOUS CONTINUOUS PRN
Status: DISCONTINUED | OUTPATIENT
Start: 2023-02-01 | End: 2023-02-01 | Stop reason: SURG

## 2023-02-01 RX ORDER — FENTANYL CITRATE 50 UG/ML
50 INJECTION, SOLUTION INTRAMUSCULAR; INTRAVENOUS
Status: DISCONTINUED | OUTPATIENT
Start: 2023-02-01 | End: 2023-02-01 | Stop reason: HOSPADM

## 2023-02-01 RX ORDER — SODIUM CHLORIDE 0.9 % (FLUSH) 0.9 %
10 SYRINGE (ML) INJECTION AS NEEDED
Status: DISCONTINUED | OUTPATIENT
Start: 2023-02-01 | End: 2023-02-01 | Stop reason: HOSPADM

## 2023-02-01 RX ORDER — SODIUM CHLORIDE 9 MG/ML
40 INJECTION, SOLUTION INTRAVENOUS AS NEEDED
Status: DISCONTINUED | OUTPATIENT
Start: 2023-02-01 | End: 2023-02-01 | Stop reason: HOSPADM

## 2023-02-01 RX ORDER — SODIUM CHLORIDE 0.9 % (FLUSH) 0.9 %
10 SYRINGE (ML) INJECTION EVERY 12 HOURS SCHEDULED
Status: DISCONTINUED | OUTPATIENT
Start: 2023-02-01 | End: 2023-02-01 | Stop reason: HOSPADM

## 2023-02-01 RX ORDER — IPRATROPIUM BROMIDE AND ALBUTEROL SULFATE 2.5; .5 MG/3ML; MG/3ML
3 SOLUTION RESPIRATORY (INHALATION) ONCE AS NEEDED
Status: DISCONTINUED | OUTPATIENT
Start: 2023-02-01 | End: 2023-02-01 | Stop reason: HOSPADM

## 2023-02-01 RX ORDER — MIDAZOLAM HYDROCHLORIDE 1 MG/ML
1 INJECTION INTRAMUSCULAR; INTRAVENOUS
Status: DISCONTINUED | OUTPATIENT
Start: 2023-02-01 | End: 2023-02-01 | Stop reason: HOSPADM

## 2023-02-01 RX ORDER — MIDAZOLAM HYDROCHLORIDE 2 MG/2ML
INJECTION, SOLUTION INTRAMUSCULAR; INTRAVENOUS AS NEEDED
Status: DISCONTINUED | OUTPATIENT
Start: 2023-02-01 | End: 2023-02-01 | Stop reason: SURG

## 2023-02-01 RX ADMIN — MEGESTROL ACETATE 400 MG: 40 SUSPENSION ORAL at 09:58

## 2023-02-01 RX ADMIN — Medication 1 TABLET: at 09:58

## 2023-02-01 RX ADMIN — NITROFURANTOIN MONOHYDRATE/MACROCRYSTALLINE 100 MG: 25; 75 CAPSULE ORAL at 20:45

## 2023-02-01 RX ADMIN — SODIUM CHLORIDE, POTASSIUM CHLORIDE, SODIUM LACTATE AND CALCIUM CHLORIDE: 600; 310; 30; 20 INJECTION, SOLUTION INTRAVENOUS at 12:50

## 2023-02-01 RX ADMIN — SUCCINYLCHOLINE CHLORIDE 100 MG: 20 INJECTION, SOLUTION INTRAMUSCULAR; INTRAVENOUS at 13:06

## 2023-02-01 RX ADMIN — CLOPIDOGREL BISULFATE 75 MG: 75 TABLET, FILM COATED ORAL at 09:58

## 2023-02-01 RX ADMIN — ONDANSETRON 4 MG: 2 INJECTION INTRAMUSCULAR; INTRAVENOUS at 13:02

## 2023-02-01 RX ADMIN — QUETIAPINE FUMARATE 200 MG: 100 TABLET ORAL at 20:45

## 2023-02-01 RX ADMIN — NITROFURANTOIN MONOHYDRATE/MACROCRYSTALLINE 100 MG: 25; 75 CAPSULE ORAL at 09:58

## 2023-02-01 RX ADMIN — LISINOPRIL 10 MG: 10 TABLET ORAL at 09:58

## 2023-02-01 RX ADMIN — PAROXETINE HYDROCHLORIDE 10 MG: 20 TABLET, FILM COATED ORAL at 06:33

## 2023-02-01 RX ADMIN — METHOHEXITAL SODIUM 100 MG: 500 INJECTION, POWDER, LYOPHILIZED, FOR SOLUTION INTRAMUSCULAR; INTRAVENOUS; RECTAL at 13:06

## 2023-02-01 RX ADMIN — ASPIRIN 81 MG: 81 TABLET, COATED ORAL at 09:58

## 2023-02-01 RX ADMIN — MIRTAZAPINE 30 MG: 15 TABLET, FILM COATED ORAL at 20:45

## 2023-02-01 RX ADMIN — MIDAZOLAM HYDROCHLORIDE 2 MG: 1 INJECTION, SOLUTION INTRAMUSCULAR; INTRAVENOUS at 13:13

## 2023-02-01 RX ADMIN — Medication 10 ML: at 10:00

## 2023-02-01 NOTE — OP NOTE
ECT OPERATIVE PROCEDURE REPORT      PATIENT NAME: Estephania Lucas    Assistants: None    Primary Surgeon: MELINDA Aldana MD    Preoperative Diagnosis:   Major Depression, Recurrent, Severe Without Psychosis    Postoperative Diagnosis: Same  : 1970    SSN:     MRN#: [id number]    PHYSICIAN: MELINDA ALDANA M.D.    PROCEDURE DATE: 23    PROCEDURE: Bifrontal ECT utilizing Thymatron System IV. % Energy Set  25  %, Charge Delivered  122.8  mC, Current  0.88  A, Stimulus Duration 7.0  Sec, Frequency  20  Hz, Pulse Width 50  msec.     This is the patient's  first  Treatment.     ANESTHESIA: See anesthesia report for medications and monitoring.                           Brevital:100        mg          Succinylcholine: 100        mg    DETAILS: Impulse at 13:06 with a resultant 50 second seizure.    Specimens Removed: NA  Blood Administered: NA  Estimated Blood Loss: None  Complications: None    Pt STATUS STABLE: 13:30  HR    Grafts or Implants: NA    Dictated utilizing Dragon dictation

## 2023-02-01 NOTE — NURSING NOTE
Patient sitting in bed , alert, oriented x 4, gag reflex positive, normal. Patient drinking small amount of water , no problem noted , denies complaints

## 2023-02-01 NOTE — ANESTHESIA PREPROCEDURE EVALUATION
Anesthesia Evaluation     Patient summary reviewed and Nursing notes reviewed   no history of anesthetic complications:  NPO Solid Status: > 8 hours  NPO Liquid Status: > 8 hours           Airway   Mallampati: II  TM distance: >3 FB  Neck ROM: full  Dental - normal exam   (+) poor dentition        Pulmonary - normal exam   (+) sleep apnea,   Cardiovascular - normal exam  Exercise tolerance: good (4-7 METS)    ECG reviewed    (+) hypertension, past MI , CAD, cardiac stents more than 12 months ago       Neuro/Psych  (+) psychiatric history Depression,    GI/Hepatic/Renal/Endo    (+)  GERD,      Musculoskeletal (-) negative ROS    Abdominal  - normal exam   Substance History   (+) drug use      Comment: History of Substance abuse   OB/GYN negative ob/gyn ROS         Other - negative ROS                     Anesthesia Plan    ASA 3     general     intravenous induction     Anesthetic plan, risks, benefits, and alternatives have been provided, discussed and informed consent has been obtained with: patient.  Pre-procedure education provided      CODE STATUS:    Code Status (Patient has no pulse and is not breathing): CPR (Attempt to Resuscitate)  Medical Interventions (Patient has pulse or is breathing): Full Support  Release to patient: Routine Release        Lab Results   Component Value Date    WBC 8.43 01/26/2023    HGB 12.7 01/26/2023    HCT 38.0 01/26/2023    MCV 93.6 01/26/2023     01/26/2023       Lab Results   Component Value Date    GLUCOSE 134 (H) 01/27/2023    BUN 17 01/27/2023    CREATININE 0.68 01/27/2023    EGFRIFNONA 77 12/27/2019    BCR 25.0 01/27/2023    K 3.7 01/27/2023    CO2 23.9 01/27/2023    CALCIUM 8.7 01/27/2023    ALBUMIN 3.5 01/27/2023    AST 12 01/27/2023    ALT 10 01/27/2023

## 2023-02-01 NOTE — PLAN OF CARE
Goal Outcome Evaluation:  Plan of Care Reviewed With: patient        Progress: no change  Outcome Evaluation: Pt remained in her bed for the majority of the shift. Pt was educated that she was having an ECT and that she would be NPO after midnight and that staff was unsure on the time of the treatment and pt stated she understood. Pt did sleep tonight and had no issues or concerns.

## 2023-02-01 NOTE — ANESTHESIA POSTPROCEDURE EVALUATION
Patient: Estephania Lucas    Procedure Summary     Date: 02/01/23 Room / Location: [facility] OR  /  [facility] OR    Anesthesia Start: 1250 Anesthesia Stop: 1319    Procedure: ELECTROCONVULSIVE THERAPY (Bilateral) Diagnosis:       Severe episode of recurrent major depressive disorder, without psychotic features (HCC)      (Severe episode of recurrent major depressive disorder, without psychotic features (HCC) [F33.2])    Surgeons: Gautam Vergara MD Provider: Eugene Tang MD    Anesthesia Type: general ASA Status: 3          Anesthesia Type: general    Vitals  Vitals Value Taken Time   /72 02/01/23 1335   Temp 99.1 °F (37.3 °C) 02/01/23 1320   Pulse 67 02/01/23 1335   Resp 15 02/01/23 1335   SpO2 100 % 02/01/23 1335           Post Anesthesia Care and Evaluation    Patient location during evaluation: PACU  Patient participation: complete - patient participated  Level of consciousness: awake  Pain score: 1  Pain management: adequate    Airway patency: patent  Anesthetic complications: No anesthetic complications  PONV Status: none  Cardiovascular status: acceptable  Respiratory status: acceptable  Hydration status: acceptable

## 2023-02-01 NOTE — PLAN OF CARE
Goal Outcome Evaluation:   Consent Given to Review Plan with: brother Verduzco  Progress: improving  Outcome Evaluation: Therapist met with patient to review plan of care; patient agreeable.       DATA:      Therapist discussed case with RN and met with patient today to review coping skills, review plan of care, and discuss discharge.     Clinical Maneuvering/Intervention:     Therapist assisted patient in processing above session content; acknowledged and normalized patient’s thoughts, feelings, and concerns.  Discussed the therapist/patient relationship and explain the parameters and limitations of relative confidentiality.  Also discussed the importance of active participation, and honesty to the treatment process.  Encouraged the patient to discuss/vent their feelings, frustrations, and fears concerning their ongoing medical issues and validated their feelings.     Allowed patient to freely discuss issues without interruption or judgment. Provided safe, confidential environment to facilitate the development of positive therapeutic relationship and encourage open, honest communication.      Therapist addressed discharge safety planning this date. Assisted patient in identifying risk factors which would indicate the need for higher level of care after discharge;  including thoughts to harm self or others and/or self-harming behavior. Encouraged patient to call 911, or present to the nearest emergency room should any of these events occur. Discussed crisis intervention services and means to access.  Encouraged securing any objects of harm.    Therapist completed integrated summary, treatment plan, and initiated social history this date.  Therapist is strongly encouraging family involvement in treatment.       Encouraged mask wearing, social distancing, and regular hand washing due to COVID19 risk.      ASSESSMENT:      Therapist met 1:1 with patient today. Patient reports no major changes in symptoms. She  "continues to report severe depression and anxiety. Patient continues to isolate to her room, including often taking her meals to her room to eat. Therapist strongly encouraged the patient to eat in the day room and educated her on how isolation will have a negative impact on her mental health. Patient continues to avoid interaction with her family, stating \"I just don't know what to say to them.\" Patient does not want her family to know she is receiving ECT today.      PLAN:       Patient to remain hospitalized this date.      Treatment team will focus efforts on stabilizing patient's acute symptoms while providing education on healthy coping and crisis management to reduce hospitalizations.   Patient requires daily psychiatrist evaluation and 24/7 nursing supervision to promote patient  safety.     Therapist will offer 1-4 individual sessions, 1 therapy group daily, family education, and appropriate referral.    "

## 2023-02-02 PROCEDURE — 99232 SBSQ HOSP IP/OBS MODERATE 35: CPT | Performed by: PSYCHIATRY & NEUROLOGY

## 2023-02-02 RX ORDER — QUETIAPINE FUMARATE 100 MG/1
100 TABLET, FILM COATED ORAL NIGHTLY
Status: DISCONTINUED | OUTPATIENT
Start: 2023-02-02 | End: 2023-02-14 | Stop reason: HOSPADM

## 2023-02-02 RX ADMIN — NITROFURANTOIN MONOHYDRATE/MACROCRYSTALLINE 100 MG: 25; 75 CAPSULE ORAL at 08:42

## 2023-02-02 RX ADMIN — Medication 1 TABLET: at 08:42

## 2023-02-02 RX ADMIN — ASPIRIN 81 MG: 81 TABLET, COATED ORAL at 08:42

## 2023-02-02 RX ADMIN — MEGESTROL ACETATE 400 MG: 40 SUSPENSION ORAL at 08:42

## 2023-02-02 RX ADMIN — NITROFURANTOIN MONOHYDRATE/MACROCRYSTALLINE 100 MG: 25; 75 CAPSULE ORAL at 20:34

## 2023-02-02 RX ADMIN — CLOPIDOGREL BISULFATE 75 MG: 75 TABLET, FILM COATED ORAL at 08:42

## 2023-02-02 RX ADMIN — LISINOPRIL 10 MG: 10 TABLET ORAL at 08:42

## 2023-02-02 NOTE — PLAN OF CARE
Goal Outcome Evaluation:   Consent Given to Review Plan with: brother Verduzco  Progress: improving  Outcome Evaluation: Therapist met with patient to review plan of care; patient agreeable.       DATA:      Therapist discussed case with RN and met with patient today to review coping skills, review plan of care, and discuss discharge.     Clinical Maneuvering/Intervention:     Therapist assisted patient in processing above session content; acknowledged and normalized patient’s thoughts, feelings, and concerns. Discussed the therapist/patient relationship and explain the parameters and limitations of relative confidentiality.  Also discussed the importance of active participation, and honesty to the treatment process.  Encouraged the patient to discuss/vent their feelings, frustrations, and fears concerning their ongoing medical issues and validated their feelings.     Allowed patient to freely discuss issues without interruption or judgment. Provided safe, confidential environment to facilitate the development of positive therapeutic relationship and encourage open, honest communication.      Therapist addressed discharge safety planning this date. Assisted patient in identifying risk factors which would indicate the need for higher level of care after discharge;  including thoughts to harm self or others and/or self-harming behavior. Encouraged patient to call 911, or present to the nearest emergency room should any of these events occur. Discussed crisis intervention services and means to access.  Encouraged securing any objects of harm.    Therapist completed integrated summary, treatment plan, and initiated social history this date.  Therapist is strongly encouraging family involvement in treatment.       Encouraged mask wearing, social distancing, and regular hand washing due to COVID19 risk.      ASSESSMENT:      Therapist met 1:1 with patient today. Patient denies suicidal ideation. Patient is calm and  "cooperative. She continues to report severe depression and anxiety. Patient states she reported slight improvement to the doctor this morning but states she doesn't feel any better now. Her affect is a bit less restricted and she is talking more spontaneously. She states she feels guilty, ashamed, and embarrassed because of her condition. She states \"I've lost everything. I've lost all this weight. I didn't do anything to deserve all this.\" Patient will not elaborate any further, becoming somewhat irritable when questioned further.       PLAN:       Patient to remain hospitalized this date.      Treatment team will focus efforts on stabilizing patient's acute symptoms while providing education on healthy coping and crisis management to reduce hospitalizations.   Patient requires daily psychiatrist evaluation and 24/7 nursing supervision to promote patient  safety.     Therapist will offer 1-4 individual sessions, 1 therapy group daily, family education, and appropriate referral.    "

## 2023-02-02 NOTE — PLAN OF CARE
Goal Outcome Evaluation:  Plan of Care Reviewed With: patient  Patient Agreement with Plan of Care: agrees         Patient rates anxiety 10 and depression 10, denies thoughts of harming self and others, and denies hallucinations.

## 2023-02-02 NOTE — PLAN OF CARE
Goal Outcome Evaluation:  Plan of Care Reviewed With: patient  Patient Agreement with Plan of Care: agrees        Outcome Evaluation: Patient rated anxiety and depression both 10/10. Denied SI, HI, AVH.

## 2023-02-02 NOTE — PROGRESS NOTES
"INPATIENT PSYCHIATRIC PROGRESS NOTE    Name:  Estephania Lucas  :  1970  MRN:  [id number]  Visit Number:  [id number]  Length of stay:  8    Behavioral Health Treatment Plan and Problem List: I have reviewed and approved the Behavioral Health Treatment Plan and Problem list.    SUBJECTIVE    CC/ Focus of exam: depression    Patient's subjective status: \"maybe a little better, thinking clearer\"    INTERVAL HISTORY: The patient does seem slightly improved -  interacting and responding to inquiries about status more relaxed, less guarded, and more spontaneous. Was able to discuss his psych and social history much easier. Patient has been receiving SSD benefits for the past 7 years or so, for \"nerves\". Says she plans to return to her brother's home in [facility].  Did not sleep well she blames on a snoring room mate. Discussed plans for ECT tomorrow and probably next week. Patient agrees.     Depression rating 10/10  Anxiety rating 5/10  Sleep: interrupted however nursing have her sleeping 8-9 hours    Craving: says not     ROS: No cardiovascular, GI, or Neurological complaints.       OBJECTIVE    Vitals:    23 0731   BP: (!) 146/101   Pulse: 95   Resp:    Temp:    SpO2:       Temp:  [98.1 °F (36.7 °C)-99.4 °F (37.4 °C)] 98.1 °F (36.7 °C)  Heart Rate:  [] 95  Resp:  [14-18] 18  BP: (106-148)/() 146/101    MENTAL STATUS EXAM:         Psychomotor: No psychomotor agitation/retardation, No EPS, No motor tics  Speech-normal rate, amount.  Mood/Affect:  Depressed  Thought Processes:  coherent  Thought Content:  mood congruent  Hallucination(s): none  Hopelessness: Yes  Optimistic:minimally  Suicidal Thoughts:   No  Suicidal Plan/Intent:  No  Homicidal Thoughts:  absent  Orientation: oriented x 3  Memory: recent intact    Lab Results (last 24 hours)     ** No results found for the last 24 hours. **           Imaging Results (Last 24 Hours)     ** No results found for the last 24 hours. ** "           Lab and x-ray studies reviewed.    ECG/EMG Results (most recent)     Procedure Component Value Units Date/Time    ECG 12 Lead Other [[id number]] Collected: 01/25/23 2144     Updated: 01/29/23 1836     QT Interval 376 ms      QTC Interval 414 ms     Narrative:      Test Reason : Baseline Cardiac Status~  Blood Pressure :   */*   mmHG  Vent. Rate :  73 BPM     Atrial Rate :  73 BPM     P-R Int : 152 ms          QRS Dur :  78 ms      QT Int : 376 ms       P-R-T Axes :  80  68  50 degrees     QTc Int : 414 ms    Normal sinus rhythm  Possible Left atrial enlargement  Anterior infarct , age undetermined  Abnormal ECG  When compared with ECG of 27-DEC-2019 10:47,  No significant change was found  Confirmed by Sammy Chavarria ([id number]) on 1/29/2023 6:34:34 PM    Referred By: BRANDON ALDANA           Confirmed By: Sammy Chavarria    ECG 12 Lead [[id number]] Collected: 01/26/23 1659     Updated: 01/29/23 1846     QT Interval 372 ms      QTC Interval 407 ms     Narrative:      Test Reason : pre ect workup  Blood Pressure :   */*   mmHG  Vent. Rate :  72 BPM     Atrial Rate :  72 BPM     P-R Int : 144 ms          QRS Dur :  94 ms      QT Int : 372 ms       P-R-T Axes :  81  78  86 degrees     QTc Int : 407 ms    Normal sinus rhythm  Normal ECG  When compared with ECG of 25-JAN-2023 21:44, (Unconfirmed)  No significant change was found  Confirmed by Sammy Chavarria ([id number]) on 1/29/2023 6:37:28 PM    Referred By: KATYA           Confirmed By: Sammy Chavarria           ALLERGIES: Patient has no known allergies.    Medications:     aspirin, 81 mg, Oral, Daily  clopidogrel, 75 mg, Oral, Daily  lisinopril, 10 mg, Oral, Daily  megestrol, 400 mg, Oral, Daily  mirtazapine, 30 mg, Oral, Nightly  multivitamin with minerals, 1 tablet, Oral, Daily  nicotine, 1 patch, Transdermal, Q24H  nitrofurantoin (macrocrystal-monohydrate), 100 mg, Oral, Q12H  QUEtiapine, 200 mg, Oral, Nightly       ASSESSMENT & PLAN    Major depressive disorder, recurrent  (HCC)  - Tapered off the  venlafaxine  and paroxetine   - Continue Mirtazapine 30 mg at bedtime and and quetiapine 200 mg hs.   - Proceed with ECT   - To provide for a supportive individual and group psychotherapeutic effort.     Coronary artery disease s/p PCI/stenting in past  - Hospitalist consult 01/26/23 recommended continuing with DAPT - aspirin and Plavix  - EKG without acute changes  - Patient w/o chest pain or anginal equivalent   - Hospitalist consult did advise that patient at acceptable risk for ECT     Hypertension  - Continue with Lisinopril     Substance abuse (HCC)  -   Does not seem to be a current issue.     Urinary tract infection  - Secondary to E. Coli per urine culture 01/26/23  - Resistant to Bactrim per susceptibility report, discontinued Bactrim and started Macrobid       Special precautions: Special Precautions Level 3 (q15 min checks)     Behavioral Health Treatment Plan and Problem List: I have reviewed and approved the Behavioral Health Treatment Plan and Problem list.    I spent a total of 26 minutes in direct patient care including  17 minutes face to face with the patient assessment, coordination of care, and counseling the patient on the current and follow-up treatment plans regarding her status and situation.  Patient had no additional questions.     MELINDA Vergara MD    Clinician:  Gautam Vergara MD  02/02/23  09:19 EST    Dictated utilizing Dragon dictation

## 2023-02-03 ENCOUNTER — ANESTHESIA EVENT (OUTPATIENT)
Dept: PERIOP | Facility: HOSPITAL | Age: 53
DRG: 885 | End: 2023-02-03
Payer: MEDICARE

## 2023-02-03 ENCOUNTER — ANESTHESIA (OUTPATIENT)
Dept: PERIOP | Facility: HOSPITAL | Age: 53
DRG: 885 | End: 2023-02-03
Payer: MEDICARE

## 2023-02-03 PROCEDURE — 25010000002 KETOROLAC TROMETHAMINE PER 15 MG: Performed by: NURSE ANESTHETIST, CERTIFIED REGISTERED

## 2023-02-03 PROCEDURE — 25010000002 MIDAZOLAM PER 1 MG: Performed by: NURSE ANESTHETIST, CERTIFIED REGISTERED

## 2023-02-03 PROCEDURE — GZB2ZZZ ELECTROCONVULSIVE THERAPY, BILATERAL-SINGLE SEIZURE: ICD-10-PCS | Performed by: PSYCHIATRY & NEUROLOGY

## 2023-02-03 PROCEDURE — 25010000002 ONDANSETRON PER 1 MG: Performed by: NURSE ANESTHETIST, CERTIFIED REGISTERED

## 2023-02-03 PROCEDURE — 90870 ELECTROCONVULSIVE THERAPY: CPT | Performed by: PSYCHIATRY & NEUROLOGY

## 2023-02-03 PROCEDURE — 25010000002 SUCCINYLCHOLINE PER 20 MG: Performed by: NURSE ANESTHETIST, CERTIFIED REGISTERED

## 2023-02-03 RX ORDER — SODIUM CHLORIDE, SODIUM LACTATE, POTASSIUM CHLORIDE, CALCIUM CHLORIDE 600; 310; 30; 20 MG/100ML; MG/100ML; MG/100ML; MG/100ML
125 INJECTION, SOLUTION INTRAVENOUS ONCE
Status: COMPLETED | OUTPATIENT
Start: 2023-02-03 | End: 2023-02-03

## 2023-02-03 RX ORDER — SUCCINYLCHOLINE CHLORIDE 20 MG/ML
INJECTION INTRAMUSCULAR; INTRAVENOUS AS NEEDED
Status: DISCONTINUED | OUTPATIENT
Start: 2023-02-03 | End: 2023-02-03 | Stop reason: SURG

## 2023-02-03 RX ORDER — SODIUM CHLORIDE, SODIUM LACTATE, POTASSIUM CHLORIDE, CALCIUM CHLORIDE 600; 310; 30; 20 MG/100ML; MG/100ML; MG/100ML; MG/100ML
100 INJECTION, SOLUTION INTRAVENOUS ONCE AS NEEDED
Status: DISCONTINUED | OUTPATIENT
Start: 2023-02-03 | End: 2023-02-03 | Stop reason: HOSPADM

## 2023-02-03 RX ORDER — SODIUM CHLORIDE 0.9 % (FLUSH) 0.9 %
10 SYRINGE (ML) INJECTION AS NEEDED
Status: DISCONTINUED | OUTPATIENT
Start: 2023-02-03 | End: 2023-02-03 | Stop reason: HOSPADM

## 2023-02-03 RX ORDER — FENTANYL CITRATE 50 UG/ML
50 INJECTION, SOLUTION INTRAMUSCULAR; INTRAVENOUS
Status: DISCONTINUED | OUTPATIENT
Start: 2023-02-03 | End: 2023-02-03 | Stop reason: HOSPADM

## 2023-02-03 RX ORDER — OXYCODONE HYDROCHLORIDE AND ACETAMINOPHEN 5; 325 MG/1; MG/1
1 TABLET ORAL ONCE AS NEEDED
Status: DISCONTINUED | OUTPATIENT
Start: 2023-02-03 | End: 2023-02-03 | Stop reason: HOSPADM

## 2023-02-03 RX ORDER — SODIUM CHLORIDE 9 MG/ML
40 INJECTION, SOLUTION INTRAVENOUS AS NEEDED
Status: DISCONTINUED | OUTPATIENT
Start: 2023-02-03 | End: 2023-02-03 | Stop reason: HOSPADM

## 2023-02-03 RX ORDER — SODIUM CHLORIDE 0.9 % (FLUSH) 0.9 %
10 SYRINGE (ML) INJECTION EVERY 12 HOURS SCHEDULED
Status: DISCONTINUED | OUTPATIENT
Start: 2023-02-03 | End: 2023-02-03 | Stop reason: HOSPADM

## 2023-02-03 RX ORDER — MIDAZOLAM HYDROCHLORIDE 1 MG/ML
1 INJECTION INTRAMUSCULAR; INTRAVENOUS
Status: DISCONTINUED | OUTPATIENT
Start: 2023-02-03 | End: 2023-02-03 | Stop reason: HOSPADM

## 2023-02-03 RX ORDER — SODIUM CHLORIDE, SODIUM LACTATE, POTASSIUM CHLORIDE, CALCIUM CHLORIDE 600; 310; 30; 20 MG/100ML; MG/100ML; MG/100ML; MG/100ML
INJECTION, SOLUTION INTRAVENOUS CONTINUOUS PRN
Status: DISCONTINUED | OUTPATIENT
Start: 2023-02-03 | End: 2023-02-03 | Stop reason: SURG

## 2023-02-03 RX ORDER — MEPERIDINE HYDROCHLORIDE 50 MG/ML
12.5 INJECTION INTRAMUSCULAR; INTRAVENOUS; SUBCUTANEOUS
Status: DISCONTINUED | OUTPATIENT
Start: 2023-02-03 | End: 2023-02-03 | Stop reason: HOSPADM

## 2023-02-03 RX ORDER — IPRATROPIUM BROMIDE AND ALBUTEROL SULFATE 2.5; .5 MG/3ML; MG/3ML
3 SOLUTION RESPIRATORY (INHALATION) ONCE AS NEEDED
Status: DISCONTINUED | OUTPATIENT
Start: 2023-02-03 | End: 2023-02-03 | Stop reason: HOSPADM

## 2023-02-03 RX ORDER — MIDAZOLAM HYDROCHLORIDE 1 MG/ML
INJECTION INTRAMUSCULAR; INTRAVENOUS AS NEEDED
Status: DISCONTINUED | OUTPATIENT
Start: 2023-02-03 | End: 2023-02-03 | Stop reason: SURG

## 2023-02-03 RX ORDER — ONDANSETRON 2 MG/ML
4 INJECTION INTRAMUSCULAR; INTRAVENOUS AS NEEDED
Status: DISCONTINUED | OUTPATIENT
Start: 2023-02-03 | End: 2023-02-03 | Stop reason: HOSPADM

## 2023-02-03 RX ORDER — KETOROLAC TROMETHAMINE 30 MG/ML
INJECTION, SOLUTION INTRAMUSCULAR; INTRAVENOUS AS NEEDED
Status: DISCONTINUED | OUTPATIENT
Start: 2023-02-03 | End: 2023-02-03 | Stop reason: SURG

## 2023-02-03 RX ORDER — ONDANSETRON 2 MG/ML
INJECTION INTRAMUSCULAR; INTRAVENOUS AS NEEDED
Status: DISCONTINUED | OUTPATIENT
Start: 2023-02-03 | End: 2023-02-03 | Stop reason: SURG

## 2023-02-03 RX ADMIN — MEGESTROL ACETATE 400 MG: 40 SUSPENSION ORAL at 08:13

## 2023-02-03 RX ADMIN — QUETIAPINE FUMARATE 100 MG: 100 TABLET ORAL at 20:53

## 2023-02-03 RX ADMIN — NITROFURANTOIN MONOHYDRATE/MACROCRYSTALLINE 100 MG: 25; 75 CAPSULE ORAL at 20:53

## 2023-02-03 RX ADMIN — MIDAZOLAM 2 MG: 1 INJECTION INTRAMUSCULAR; INTRAVENOUS at 09:59

## 2023-02-03 RX ADMIN — SUCCINYLCHOLINE CHLORIDE 100 MG: 20 INJECTION, SOLUTION INTRAMUSCULAR; INTRAVENOUS at 09:53

## 2023-02-03 RX ADMIN — MIRTAZAPINE 30 MG: 15 TABLET, FILM COATED ORAL at 20:53

## 2023-02-03 RX ADMIN — NITROFURANTOIN MONOHYDRATE/MACROCRYSTALLINE 100 MG: 25; 75 CAPSULE ORAL at 08:14

## 2023-02-03 RX ADMIN — CLOPIDOGREL BISULFATE 75 MG: 75 TABLET, FILM COATED ORAL at 08:13

## 2023-02-03 RX ADMIN — ONDANSETRON 4 MG: 2 INJECTION INTRAMUSCULAR; INTRAVENOUS at 09:59

## 2023-02-03 RX ADMIN — METHOHEXITAL SODIUM 100 MG: 500 INJECTION, POWDER, LYOPHILIZED, FOR SOLUTION INTRAMUSCULAR; INTRAVENOUS; RECTAL at 09:53

## 2023-02-03 RX ADMIN — SODIUM CHLORIDE, POTASSIUM CHLORIDE, SODIUM LACTATE AND CALCIUM CHLORIDE: 600; 310; 30; 20 INJECTION, SOLUTION INTRAVENOUS at 09:45

## 2023-02-03 RX ADMIN — Medication 1 TABLET: at 08:13

## 2023-02-03 RX ADMIN — ASPIRIN 81 MG: 81 TABLET, COATED ORAL at 08:08

## 2023-02-03 RX ADMIN — LISINOPRIL 10 MG: 10 TABLET ORAL at 08:13

## 2023-02-03 RX ADMIN — SODIUM CHLORIDE, POTASSIUM CHLORIDE, SODIUM LACTATE AND CALCIUM CHLORIDE 125 ML/HR: 600; 310; 30; 20 INJECTION, SOLUTION INTRAVENOUS at 09:21

## 2023-02-03 RX ADMIN — KETOROLAC TROMETHAMINE 30 MG: 30 INJECTION, SOLUTION INTRAMUSCULAR; INTRAVENOUS at 09:59

## 2023-02-03 NOTE — ANESTHESIA POSTPROCEDURE EVALUATION
Patient: Estephania Lucas    Procedure Summary     Date: 02/03/23 Room / Location: [facility] OR 05 /  [facility] OR    Anesthesia Start: 0944 Anesthesia Stop: 1003    Procedure: ELECTROCONVULSIVE THERAPY (Bilateral) Diagnosis:       Severe episode of recurrent major depressive disorder, without psychotic features (HCC)      (Severe episode of recurrent major depressive disorder, without psychotic features (HCC) [F33.2])    Surgeons: Gautam Vergara MD Provider: Yair Torres MD    Anesthesia Type: general ASA Status: 3          Anesthesia Type: general    Vitals  Vitals Value Taken Time   /86 02/03/23 1010   Temp 98.9 °F (37.2 °C) 02/03/23 1005   Pulse 84 02/03/23 1010   Resp 20 02/03/23 1010   SpO2 99 % 02/03/23 1010           Post Anesthesia Care and Evaluation    Patient location during evaluation: bedside  Patient participation: complete - patient participated  Level of consciousness: awake and alert  Pain score: 1  Pain management: adequate    Airway patency: patent  Anesthetic complications: No anesthetic complications  PONV Status: none  Cardiovascular status: acceptable  Respiratory status: acceptable  Hydration status: acceptable

## 2023-02-03 NOTE — NURSING NOTE
Patient has signed consents for ECT, 22g IV placed in left wrist. Patient was NPO after midnight. Called report to outpatient surgery.    130/86, 89 HR, 99% room air. Temp -98.4

## 2023-02-03 NOTE — PLAN OF CARE
Goal Outcome Evaluation:  Plan of Care Reviewed With: patient  Patient Agreement with Plan of Care: agrees

## 2023-02-03 NOTE — PLAN OF CARE
Goal Outcome Evaluation:   Consent Given to Review Plan with: brother Verduzco  Progress: improving  Outcome Evaluation: Therapist met with patient to review plan of care; patient agreeable.       DATA:      Therapist discussed case with RN and met with patient today to review coping skills, review plan of care, and discuss discharge.     Clinical Maneuvering/Intervention:     Therapist assisted patient in processing above session content; acknowledged and normalized patient’s thoughts, feelings, and concerns.  Discussed the therapist/patient relationship and explain the parameters and limitations of relative confidentiality.  Also discussed the importance of active participation, and honesty to the treatment process.  Encouraged the patient to discuss/vent their feelings, frustrations, and fears concerning their ongoing medical issues and validated their feelings.     Allowed patient to freely discuss issues without interruption or judgment. Provided safe, confidential environment to facilitate the development of positive therapeutic relationship and encourage open, honest communication.      Therapist addressed discharge safety planning this date. Assisted patient in identifying risk factors which would indicate the need for higher level of care after discharge;  including thoughts to harm self or others and/or self-harming behavior. Encouraged patient to call 911, or present to the nearest emergency room should any of these events occur. Discussed crisis intervention services and means to access.  Encouraged securing any objects of harm.    Therapist completed integrated summary, treatment plan, and initiated social history this date.  Therapist is strongly encouraging family involvement in treatment.       Encouraged mask wearing, social distancing, and regular hand washing due to COVID19 risk.      ASSESSMENT:      Therapist met 1:1 with patient today. Patient denies suicidal ideation. Patient remains  depressed, though reports some improvement. Her affect is brighter and the patient is more engaged in conversation. Patient smiled and laughed intermittently throughout session. She reports she feels like she is on the right track. Patient plans to call her brother today to discuss the possibility of moving in with him after discharge. She denies any additional needs.      PLAN:       Patient to remain hospitalized this date.      Treatment team will focus efforts on stabilizing patient's acute symptoms while providing education on healthy coping and crisis management to reduce hospitalizations.   Patient requires daily psychiatrist evaluation and 24/7 nursing supervision to promote patient  safety.     Therapist will offer 1-4 individual sessions, 1 therapy group daily, family education, and appropriate referral.

## 2023-02-03 NOTE — NURSING NOTE
Patient return from Ect at this time- Patient alert and oriented X3. Patient ambulate in room approp. Head to toe assessment completed. Gag reflux presents. Vitals stable as charted. Will continue to monitor for safety.

## 2023-02-03 NOTE — ANESTHESIA POSTPROCEDURE EVALUATION
Patient: Estephania Lucas    Procedure Summary     Date: 02/03/23 Room / Location: [facility] OR 05 /  [facility] OR    Anesthesia Start: 0944 Anesthesia Stop: 1003    Procedure: ELECTROCONVULSIVE THERAPY (Bilateral) Diagnosis:       Severe episode of recurrent major depressive disorder, without psychotic features (HCC)      (Severe episode of recurrent major depressive disorder, without psychotic features (HCC) [F33.2])    Surgeons: Gautam Vergara MD Provider: Yair Torres MD    Anesthesia Type: general ASA Status: 3          Anesthesia Type: general    Vitals  Vitals Value Taken Time   /85 02/03/23 1035   Temp 98.1 °F (36.7 °C) 02/03/23 1035   Pulse 76 02/03/23 1035   Resp 16 02/03/23 1035   SpO2 98 % 02/03/23 1035           Post Anesthesia Care and Evaluation    Patient location during evaluation: PHASE II  Patient participation: complete - patient participated  Level of consciousness: awake and alert  Pain score: 0  Pain management: adequate    Airway patency: patent  Anesthetic complications: No anesthetic complications    Cardiovascular status: acceptable  Respiratory status: acceptable  Hydration status: acceptable

## 2023-02-03 NOTE — ANESTHESIA POSTPROCEDURE EVALUATION
Patient: Estephania Lucas    Procedure Summary     Date: 02/03/23 Room / Location: [facility] OR  /  [facility] OR    Anesthesia Start: 0944 Anesthesia Stop: 1003    Procedure: ELECTROCONVULSIVE THERAPY (Bilateral) Diagnosis:       Severe episode of recurrent major depressive disorder, without psychotic features (HCC)      (Severe episode of recurrent major depressive disorder, without psychotic features (HCC) [F33.2])    Surgeons: Gautam Vergara MD Provider: Yair Torres MD    Anesthesia Type: general ASA Status: 3          Anesthesia Type: general    Vitals  Vitals Value Taken Time   /85 02/03/23 1015   Temp 98.9 °F (37.2 °C) 02/03/23 1005   Pulse 91 02/03/23 1015   Resp 23 02/03/23 1015   SpO2 95 % 02/03/23 1015           Post Anesthesia Care and Evaluation    Patient location during evaluation: bedside  Patient participation: complete - patient participated  Level of consciousness: awake and alert  Pain score: 1  Pain management: adequate    Airway patency: patent  Anesthetic complications: No anesthetic complications  PONV Status: none  Cardiovascular status: acceptable  Respiratory status: acceptable  Hydration status: acceptable

## 2023-02-03 NOTE — PLAN OF CARE
Goal Outcome Evaluation:  Plan of Care Reviewed With: patient  Patient Agreement with Plan of Care: agrees     Progress: no change  Outcome Evaluation: Patient rated anxiety and depression both 10/10. Denied SI, HI, AVH.

## 2023-02-03 NOTE — OP NOTE
ECT OPERATIVE PROCEDURE REPORT      PATIENT NAME: Estephania Lucas    Assistants: None    Primary Surgeon: EMLINDA Aldana MD    Preoperative Diagnosis:   Major Depression, Recurrent, Severe Without Psychosis    Postoperative Diagnosis: Same  : 1970    SSN:     MRN#: [id number]    PHYSICIAN: MELINDA ALDANA M.D.    PROCEDURE DATE: 23    PROCEDURE: Bifrontal ECT utilizing Thymatron System IV. % Energy Set  25  %, Charge Delivered  124.0  mC, Current  0.89  A, Stimulus Duration 7.0  Sec, Frequency  20  Hz, Pulse Width 0.50  msec.     This is the patient's  2  Treatment.     ANESTHESIA: See anesthesia report for medications and monitoring.                           Brevital:100        mg          Succinylcholine: 100        mg    DETAILS: Impulse at 0950 with a resultant 30 second seizure.    Specimens Removed: NA  Blood Administered: NA  Estimated Blood Loss: None  Complications: None    Pt STATUS STABLE: 1030  HR    Grafts or Implants: NA    Dictated utilizing Dragon dictation

## 2023-02-03 NOTE — ANESTHESIA PREPROCEDURE EVALUATION
Anesthesia Evaluation     Patient summary reviewed and Nursing notes reviewed   no history of anesthetic complications:  NPO Solid Status: > 8 hours  NPO Liquid Status: > 8 hours           Airway   Mallampati: II  TM distance: >3 FB  Neck ROM: full  Dental - normal exam   (+) poor dentition        Pulmonary - normal exam   (+) sleep apnea,   Cardiovascular - normal exam  Exercise tolerance: good (4-7 METS)    ECG reviewed    (+) hypertension, past MI , CAD, cardiac stents more than 12 months ago       Neuro/Psych  (+) psychiatric history Depression,    GI/Hepatic/Renal/Endo    (+)  GERD,      Musculoskeletal (-) negative ROS    Abdominal  - normal exam   Substance History   (+) drug use      Comment: History of Substance abuse   OB/GYN negative ob/gyn ROS         Other - negative ROS                         Anesthesia Plan    ASA 3     general     intravenous induction     Anesthetic plan, risks, benefits, and alternatives have been provided, discussed and informed consent has been obtained with: patient.  Pre-procedure education provided      CODE STATUS:         Lab Results   Component Value Date    WBC 8.43 01/26/2023    HGB 12.7 01/26/2023    HCT 38.0 01/26/2023    MCV 93.6 01/26/2023     01/26/2023       Lab Results   Component Value Date    GLUCOSE 134 (H) 01/27/2023    BUN 17 01/27/2023    CREATININE 0.68 01/27/2023    EGFRIFNONA 77 12/27/2019    BCR 25.0 01/27/2023    K 3.7 01/27/2023    CO2 23.9 01/27/2023    CALCIUM 8.7 01/27/2023    ALBUMIN 3.5 01/27/2023    AST 12 01/27/2023    ALT 10 01/27/2023

## 2023-02-04 PROCEDURE — 99232 SBSQ HOSP IP/OBS MODERATE 35: CPT | Performed by: PSYCHIATRY & NEUROLOGY

## 2023-02-04 RX ADMIN — Medication 1 TABLET: at 08:39

## 2023-02-04 RX ADMIN — MIRTAZAPINE 30 MG: 15 TABLET, FILM COATED ORAL at 20:39

## 2023-02-04 RX ADMIN — ASPIRIN 81 MG: 81 TABLET, COATED ORAL at 08:39

## 2023-02-04 RX ADMIN — LISINOPRIL 10 MG: 10 TABLET ORAL at 08:39

## 2023-02-04 RX ADMIN — QUETIAPINE FUMARATE 100 MG: 100 TABLET ORAL at 20:39

## 2023-02-04 RX ADMIN — CLOPIDOGREL BISULFATE 75 MG: 75 TABLET, FILM COATED ORAL at 08:39

## 2023-02-04 RX ADMIN — MEGESTROL ACETATE 400 MG: 40 SUSPENSION ORAL at 08:39

## 2023-02-04 NOTE — PLAN OF CARE
Goal Outcome Evaluation:  Plan of Care Reviewed With: patient  Patient Agreement with Plan of Care: agrees     Progress: improving  Outcome Evaluation: Pt rates anxiety 10, depression 10 on 0-10 scale. Denies SI/HI/AVH. Reports sleep not good, appetite better. Calm and cooperative with staff and peers.

## 2023-02-04 NOTE — PROGRESS NOTES
"      Inpatient Psych Progress Note     Clinician: Brenden Vergara MD  Admission Date: 1/25/2023  08:53 EST 02/04/23    Behavioral Health Treatment Plan and Problem List: I have reviewed and approved the Behavioral Health Treatment Plan and Problem list.    Allergies  No Known Allergies    Hospital Day: 10 days      Assessment completed within view of staff    History  CC/clinical focus: depression     Interval HPI: Patient seen and evaluated by me.  Chart reviewed. Feeling \"so-so\" today. She is not sleeping well, she states that she is still very depressed. She denies SI/HI today. She denies any auditory of visual hallucinations. She has no acute complaints today.   Med Compliant.  ROS otherwise as below.      Interval Review of Systems:   General ROS: negative for - fever or malaise  Endocrine ROS: negative for - palpitations  Respiratory ROS: no cough, shortness of breath, or wheezing  Cardiovascular ROS: no chest pain or dyspnea on exertion  Gastrointestinal ROS: no abdominal pain,no black or bloody stools    /67 (BP Location: Right arm, Patient Position: Sitting) Comment: 103/65 STANDING  Pulse 90 Comment: 84 STANDING  Temp 98 °F (36.7 °C) (Temporal)   Resp 18   Ht 167.6 cm (66\")   Wt 45.5 kg (100 lb 6.4 oz)   SpO2 99%   BMI 16.20 kg/m²     Mental Status Exam  Mood: depressed  Affect: mood-congruent   Thought Processes: linear, logical, and goal directed  Thought Content: normal  Hallucinations: no  Suicidal Thoughts: denies  Suicidal Plan/Intent: denies  Hopelesness:Moderate  Homicidal Thoughts:  denies      Medical Decision Making:   Labs:     Lab Results (last 24 hours)     ** No results found for the last 24 hours. **            Radiology:     Imaging Results (Last 24 Hours)     ** No results found for the last 24 hours. **            EKG:     ECG/EMG Results (most recent)     Procedure Component Value Units Date/Time    ECG 12 Lead Other [[id number]] Collected: 01/25/23 2144     Updated: " 01/29/23 1836     QT Interval 376 ms      QTC Interval 414 ms     Narrative:      Test Reason : Baseline Cardiac Status~  Blood Pressure :   */*   mmHG  Vent. Rate :  73 BPM     Atrial Rate :  73 BPM     P-R Int : 152 ms          QRS Dur :  78 ms      QT Int : 376 ms       P-R-T Axes :  80  68  50 degrees     QTc Int : 414 ms    Normal sinus rhythm  Possible Left atrial enlargement  Anterior infarct , age undetermined  Abnormal ECG  When compared with ECG of 27-DEC-2019 10:47,  No significant change was found  Confirmed by Sammy Chavarria ([id number]) on 1/29/2023 6:34:34 PM    Referred By: BRANDON ALDANA           Confirmed By: Sammy Chavarria    ECG 12 Lead [[id number]] Collected: 01/26/23 1659     Updated: 01/29/23 1846     QT Interval 372 ms      QTC Interval 407 ms     Narrative:      Test Reason : pre ect workup  Blood Pressure :   */*   mmHG  Vent. Rate :  72 BPM     Atrial Rate :  72 BPM     P-R Int : 144 ms          QRS Dur :  94 ms      QT Int : 372 ms       P-R-T Axes :  81  78  86 degrees     QTc Int : 407 ms    Normal sinus rhythm  Normal ECG  When compared with ECG of 25-JAN-2023 21:44, (Unconfirmed)  No significant change was found  Confirmed by Sammy Chavarria ([id number]) on 1/29/2023 6:37:28 PM    Referred By: KATYA           Confirmed By: Sammy Chavarria           Medications:  aspirin, 81 mg, Oral, Daily  clopidogrel, 75 mg, Oral, Daily  lisinopril, 10 mg, Oral, Daily  megestrol, 400 mg, Oral, Daily  mirtazapine, 30 mg, Oral, Nightly  multivitamin with minerals, 1 tablet, Oral, Daily  nicotine, 1 patch, Transdermal, Q24H  nitrofurantoin (macrocrystal-monohydrate), 100 mg, Oral, Q12H  QUEtiapine, 100 mg, Oral, Nightly           All medications reviewed.      Assessment and Plan:     Major depressive disorder, recurrent (HCC)  - Tapered off the venlafaxine and paroxetine   - Continue Mirtazapine 30 mg at bedtime and and quetiapine 200 mg hs  - Proceeded with ECT 02/03/23 - pt tolerated well   - To provide for a  supportive individual and group psychotherapeutic effort.     Coronary artery disease s/p PCI/stenting in past  - Hospitalist consult 01/26/23 recommended continuing with DAPT - aspirin and Plavix  - EKG without acute changes  - Patient w/o chest pain or anginal equivalent   - Hospitalist consult did advise that patient at acceptable risk for ECT     Hypertension  - Continue with Lisinopril     Substance abuse (HCC)  -   Does not seem to be a current issue     Urinary tract infection  - Secondary to E. Coli per urine culture 01/26/23  - Resistant to Bactrim per susceptibility report, discontinued Bactrim and started Macrobid          Continue hospitalization for safety and stabilization.  Continue current level of Special Precautions (q15 minute checks).        This note was generated by a scribe, Jose Han. The work documented in this note was completed, reviewed, and approved by the attending psychiatrist as designated Dr. Brenden Vergara electronic signature.     I, Brenden Vergara MD, personally performed the services described in this documentation as scribed by the above named individual and is both accurate and complete.

## 2023-02-04 NOTE — PLAN OF CARE
Problem: Mood Impairment (Depressive Signs/Symptoms)  Goal: Improved Mood Symptoms (Depressive Signs/Symptoms)  Intervention: Promote Mood Improvement  Recent Flowsheet Documentation  Taken 2/4/2023 8215 by Bambi Huddleston RN  Diversional Activity:   reading   television   art work   music   Goal Outcome Evaluation:

## 2023-02-05 PROCEDURE — 99232 SBSQ HOSP IP/OBS MODERATE 35: CPT | Performed by: PSYCHIATRY & NEUROLOGY

## 2023-02-05 RX ORDER — SODIUM CHLORIDE 0.9 % (FLUSH) 0.9 %
10 SYRINGE (ML) INJECTION EVERY 12 HOURS SCHEDULED
Status: DISCONTINUED | OUTPATIENT
Start: 2023-02-06 | End: 2023-02-06 | Stop reason: HOSPADM

## 2023-02-05 RX ORDER — SODIUM CHLORIDE 9 MG/ML
40 INJECTION, SOLUTION INTRAVENOUS AS NEEDED
Status: DISCONTINUED | OUTPATIENT
Start: 2023-02-05 | End: 2023-02-06 | Stop reason: HOSPADM

## 2023-02-05 RX ORDER — SODIUM CHLORIDE 0.9 % (FLUSH) 0.9 %
10 SYRINGE (ML) INJECTION AS NEEDED
Status: DISCONTINUED | OUTPATIENT
Start: 2023-02-05 | End: 2023-02-06 | Stop reason: HOSPADM

## 2023-02-05 RX ADMIN — LISINOPRIL 10 MG: 10 TABLET ORAL at 09:27

## 2023-02-05 RX ADMIN — Medication 10 ML: at 23:47

## 2023-02-05 RX ADMIN — MEGESTROL ACETATE 400 MG: 40 SUSPENSION ORAL at 09:28

## 2023-02-05 RX ADMIN — ASPIRIN 81 MG: 81 TABLET, COATED ORAL at 09:27

## 2023-02-05 RX ADMIN — CLOPIDOGREL BISULFATE 75 MG: 75 TABLET, FILM COATED ORAL at 09:27

## 2023-02-05 RX ADMIN — Medication 1 TABLET: at 09:27

## 2023-02-05 NOTE — PROGRESS NOTES
"      Inpatient Psych Progress Note     Clinician: Brenden Vergara MD  Admission Date: 1/25/2023  08:42 EST 02/05/23    Behavioral Health Treatment Plan and Problem List: I have reviewed and approved the Behavioral Health Treatment Plan and Problem list.    Allergies  No Known Allergies    Hospital Day: 11 days      Assessment completed within view of staff    History  CC/clinical focus: depression     Interval HPI: Patient seen and evaluated by me.  Chart reviewed. Pt has no acute complaints today. She states that he has dark thoughts, such as \"why is this happening to me.\" She denies SI/HI. She slept well last night.   Med Compliant.  ROS otherwise as below.      Interval Review of Systems:   General ROS: negative for - fever or malaise  Endocrine ROS: negative for - palpitations  Respiratory ROS: no cough, shortness of breath, or wheezing  Cardiovascular ROS: no chest pain or dyspnea on exertion  Gastrointestinal ROS: no abdominal pain,no black or bloody stools    /84 (BP Location: Right arm, Patient Position: Standing)   Pulse 97   Temp 98.2 °F (36.8 °C) (Temporal)   Resp 18   Ht 167.6 cm (66\")   Wt 45.5 kg (100 lb 6.4 oz)   SpO2 98%   BMI 16.20 kg/m²     Mental Status Exam  Mood: depressed  Affect: mood-congruent   Thought Processes: linear, logical, and goal directed  Thought Content: normal  Hallucinations: no  Suicidal Thoughts: denies  Suicidal Plan/Intent: denies  Hopelesness:Moderate  Homicidal Thoughts:  denies      Medical Decision Making:   Labs:     Lab Results (last 24 hours)     ** No results found for the last 24 hours. **            Radiology:     Imaging Results (Last 24 Hours)     ** No results found for the last 24 hours. **            EKG:     ECG/EMG Results (most recent)     Procedure Component Value Units Date/Time    ECG 12 Lead Other [[id number]] Collected: 01/25/23 2144     Updated: 01/29/23 1836     QT Interval 376 ms      QTC Interval 414 ms     Narrative:      Test Reason : " Baseline Cardiac Status~  Blood Pressure :   */*   mmHG  Vent. Rate :  73 BPM     Atrial Rate :  73 BPM     P-R Int : 152 ms          QRS Dur :  78 ms      QT Int : 376 ms       P-R-T Axes :  80  68  50 degrees     QTc Int : 414 ms    Normal sinus rhythm  Possible Left atrial enlargement  Anterior infarct , age undetermined  Abnormal ECG  When compared with ECG of 27-DEC-2019 10:47,  No significant change was found  Confirmed by Sammy Chavarria ([id number]) on 1/29/2023 6:34:34 PM    Referred By: BRANDON ALDANA           Confirmed By: Sammy Chavarria    ECG 12 Lead [[id number]] Collected: 01/26/23 1659     Updated: 01/29/23 1846     QT Interval 372 ms      QTC Interval 407 ms     Narrative:      Test Reason : pre ect workup  Blood Pressure :   */*   mmHG  Vent. Rate :  72 BPM     Atrial Rate :  72 BPM     P-R Int : 144 ms          QRS Dur :  94 ms      QT Int : 372 ms       P-R-T Axes :  81  78  86 degrees     QTc Int : 407 ms    Normal sinus rhythm  Normal ECG  When compared with ECG of 25-JAN-2023 21:44, (Unconfirmed)  No significant change was found  Confirmed by Sammy Chavarria ([id number]) on 1/29/2023 6:37:28 PM    Referred By: KATYA           Confirmed By: Sammy Chavarria           Medications:  aspirin, 81 mg, Oral, Daily  clopidogrel, 75 mg, Oral, Daily  lisinopril, 10 mg, Oral, Daily  megestrol, 400 mg, Oral, Daily  mirtazapine, 30 mg, Oral, Nightly  multivitamin with minerals, 1 tablet, Oral, Daily  nicotine, 1 patch, Transdermal, Q24H  QUEtiapine, 100 mg, Oral, Nightly           All medications reviewed.      Assessment and Plan:     Major depressive disorder, recurrent (HCC)  - Tapered off the venlafaxine and paroxetine   - Continue Mirtazapine 30 mg at bedtime and and quetiapine 200 mg hs  - Proceeded with ECT 02/03/23 - pt tolerated well   - To provide for a supportive individual and group psychotherapeutic effort.     Coronary artery disease s/p PCI/stenting in past  - Hospitalist consult 01/26/23 recommended continuing  with DAPT - aspirin and Plavix  - EKG without acute changes  - Patient w/o chest pain or anginal equivalent   - Hospitalist consult did advise that patient at acceptable risk for ECT     Hypertension  - Continue with Lisinopril     Substance abuse (HCC)  -   Does not seem to be a current issue     Urinary tract infection  - Secondary to E. Coli per urine culture 01/26/23  - Resistant to Bactrim per susceptibility report, discontinued Bactrim and started Macrobid          Continue hospitalization for safety and stabilization.  Continue current level of Special Precautions (q15 minute checks).        This note was generated by a scribeJose. The work documented in this note was completed, reviewed, and approved by the attending psychiatrist as designated Dr. Brenden Vergara electronic signature.     I, Brenden Vergara MD, personally performed the services described in this documentation as scribed by the above named individual and is both accurate and complete.

## 2023-02-05 NOTE — PLAN OF CARE
Problem: Adult Behavioral Health Plan of Care  Goal: Optimized Coping Skills in Response to Life Stressors  Intervention: Promote Effective Coping Strategies  Recent Flowsheet Documentation  Taken 2/5/2023 6430 by Bmabi Huddleston RN  Supportive Measures:   verbalization of feelings encouraged   active listening utilized   positive reinforcement provided   self-care encouraged   Goal Outcome Evaluation:

## 2023-02-06 ENCOUNTER — ANESTHESIA EVENT (OUTPATIENT)
Dept: PERIOP | Facility: HOSPITAL | Age: 53
DRG: 885 | End: 2023-02-06
Payer: MEDICARE

## 2023-02-06 ENCOUNTER — ANESTHESIA (OUTPATIENT)
Dept: PERIOP | Facility: HOSPITAL | Age: 53
DRG: 885 | End: 2023-02-06
Payer: MEDICARE

## 2023-02-06 PROCEDURE — 25010000002 SUCCINYLCHOLINE PER 20 MG: Performed by: NURSE ANESTHETIST, CERTIFIED REGISTERED

## 2023-02-06 PROCEDURE — GZB2ZZZ ELECTROCONVULSIVE THERAPY, BILATERAL-SINGLE SEIZURE: ICD-10-PCS | Performed by: PSYCHIATRY & NEUROLOGY

## 2023-02-06 PROCEDURE — 25010000002 MIDAZOLAM PER 1 MG: Performed by: NURSE ANESTHETIST, CERTIFIED REGISTERED

## 2023-02-06 PROCEDURE — 90870 ELECTROCONVULSIVE THERAPY: CPT | Performed by: PSYCHIATRY & NEUROLOGY

## 2023-02-06 RX ORDER — MIDAZOLAM HYDROCHLORIDE 1 MG/ML
1 INJECTION INTRAMUSCULAR; INTRAVENOUS
Status: DISCONTINUED | OUTPATIENT
Start: 2023-02-06 | End: 2023-02-06 | Stop reason: HOSPADM

## 2023-02-06 RX ORDER — OXYCODONE HYDROCHLORIDE AND ACETAMINOPHEN 5; 325 MG/1; MG/1
1 TABLET ORAL ONCE AS NEEDED
Status: DISCONTINUED | OUTPATIENT
Start: 2023-02-06 | End: 2023-02-06 | Stop reason: HOSPADM

## 2023-02-06 RX ORDER — SODIUM CHLORIDE 0.9 % (FLUSH) 0.9 %
10 SYRINGE (ML) INJECTION EVERY 12 HOURS SCHEDULED
Status: DISCONTINUED | OUTPATIENT
Start: 2023-02-06 | End: 2023-02-06 | Stop reason: HOSPADM

## 2023-02-06 RX ORDER — SODIUM CHLORIDE, SODIUM LACTATE, POTASSIUM CHLORIDE, CALCIUM CHLORIDE 600; 310; 30; 20 MG/100ML; MG/100ML; MG/100ML; MG/100ML
INJECTION, SOLUTION INTRAVENOUS CONTINUOUS PRN
Status: DISCONTINUED | OUTPATIENT
Start: 2023-02-06 | End: 2023-02-06 | Stop reason: SURG

## 2023-02-06 RX ORDER — SODIUM CHLORIDE, SODIUM LACTATE, POTASSIUM CHLORIDE, CALCIUM CHLORIDE 600; 310; 30; 20 MG/100ML; MG/100ML; MG/100ML; MG/100ML
100 INJECTION, SOLUTION INTRAVENOUS ONCE AS NEEDED
Status: DISCONTINUED | OUTPATIENT
Start: 2023-02-06 | End: 2023-02-06 | Stop reason: HOSPADM

## 2023-02-06 RX ORDER — KETOROLAC TROMETHAMINE 30 MG/ML
30 INJECTION, SOLUTION INTRAMUSCULAR; INTRAVENOUS EVERY 6 HOURS PRN
Status: DISCONTINUED | OUTPATIENT
Start: 2023-02-06 | End: 2023-02-06 | Stop reason: HOSPADM

## 2023-02-06 RX ORDER — SODIUM CHLORIDE, SODIUM LACTATE, POTASSIUM CHLORIDE, CALCIUM CHLORIDE 600; 310; 30; 20 MG/100ML; MG/100ML; MG/100ML; MG/100ML
125 INJECTION, SOLUTION INTRAVENOUS ONCE
Status: DISCONTINUED | OUTPATIENT
Start: 2023-02-06 | End: 2023-02-06 | Stop reason: HOSPADM

## 2023-02-06 RX ORDER — IPRATROPIUM BROMIDE AND ALBUTEROL SULFATE 2.5; .5 MG/3ML; MG/3ML
3 SOLUTION RESPIRATORY (INHALATION) ONCE AS NEEDED
Status: DISCONTINUED | OUTPATIENT
Start: 2023-02-06 | End: 2023-02-06 | Stop reason: HOSPADM

## 2023-02-06 RX ORDER — SODIUM CHLORIDE 0.9 % (FLUSH) 0.9 %
10 SYRINGE (ML) INJECTION AS NEEDED
Status: DISCONTINUED | OUTPATIENT
Start: 2023-02-06 | End: 2023-02-06 | Stop reason: HOSPADM

## 2023-02-06 RX ORDER — ONDANSETRON 2 MG/ML
4 INJECTION INTRAMUSCULAR; INTRAVENOUS AS NEEDED
Status: DISCONTINUED | OUTPATIENT
Start: 2023-02-06 | End: 2023-02-06 | Stop reason: HOSPADM

## 2023-02-06 RX ORDER — SUCCINYLCHOLINE CHLORIDE 20 MG/ML
INJECTION INTRAMUSCULAR; INTRAVENOUS AS NEEDED
Status: DISCONTINUED | OUTPATIENT
Start: 2023-02-06 | End: 2023-02-06 | Stop reason: SURG

## 2023-02-06 RX ORDER — MIDAZOLAM HYDROCHLORIDE 1 MG/ML
INJECTION INTRAMUSCULAR; INTRAVENOUS AS NEEDED
Status: DISCONTINUED | OUTPATIENT
Start: 2023-02-06 | End: 2023-02-06 | Stop reason: SURG

## 2023-02-06 RX ORDER — SODIUM CHLORIDE 9 MG/ML
40 INJECTION, SOLUTION INTRAVENOUS AS NEEDED
Status: DISCONTINUED | OUTPATIENT
Start: 2023-02-06 | End: 2023-02-06 | Stop reason: HOSPADM

## 2023-02-06 RX ORDER — FENTANYL CITRATE 50 UG/ML
50 INJECTION, SOLUTION INTRAMUSCULAR; INTRAVENOUS
Status: DISCONTINUED | OUTPATIENT
Start: 2023-02-06 | End: 2023-02-06 | Stop reason: HOSPADM

## 2023-02-06 RX ORDER — MEPERIDINE HYDROCHLORIDE 50 MG/ML
12.5 INJECTION INTRAMUSCULAR; INTRAVENOUS; SUBCUTANEOUS
Status: DISCONTINUED | OUTPATIENT
Start: 2023-02-06 | End: 2023-02-06 | Stop reason: HOSPADM

## 2023-02-06 RX ADMIN — SUCCINYLCHOLINE CHLORIDE 100 MG: 20 INJECTION, SOLUTION INTRAMUSCULAR; INTRAVENOUS at 08:28

## 2023-02-06 RX ADMIN — SODIUM CHLORIDE, POTASSIUM CHLORIDE, SODIUM LACTATE AND CALCIUM CHLORIDE: 600; 310; 30; 20 INJECTION, SOLUTION INTRAVENOUS at 08:23

## 2023-02-06 RX ADMIN — METHOHEXITAL SODIUM 100 MG: 500 INJECTION, POWDER, LYOPHILIZED, FOR SOLUTION INTRAMUSCULAR; INTRAVENOUS; RECTAL at 08:28

## 2023-02-06 RX ADMIN — MIDAZOLAM 2 MG: 1 INJECTION INTRAMUSCULAR; INTRAVENOUS at 08:30

## 2023-02-06 RX ADMIN — QUETIAPINE FUMARATE 100 MG: 100 TABLET ORAL at 20:45

## 2023-02-06 RX ADMIN — MIRTAZAPINE 30 MG: 15 TABLET, FILM COATED ORAL at 20:45

## 2023-02-06 RX ADMIN — LISINOPRIL 10 MG: 10 TABLET ORAL at 10:20

## 2023-02-06 NOTE — PLAN OF CARE
Problem: Adult Behavioral Health Plan of Care  Goal: Plan of Care Review  Recent Flowsheet Documentation  Taken 2/6/2023 1328 by Anil Davis, RN  Outcome Evaluation: PATIENT VERBALIZES POOR SLEEP, SEVERE ANXIETY AND DEPRESSION AS ONLY PROBLEMS THIS SHIFT. PATIENT IS AOX3, COOPERATIVE WITH NO S/S OF ACUTE DISTRESS NOTED. PATIENT HAD ECT THIS SHIFT WITH NO ILL EFFECTS NOTED. NNO NOTED.  Taken 2/6/2023 0935 by Anil Davis, RN  Plan of Care Reviewed With: patient  Patient Agreement with Plan of Care: agrees  Taken 2/6/2023 0733 by Anil Davis, RN  Plan of Care Reviewed With: patient  Patient Agreement with Plan of Care: agrees   Goal Outcome Evaluation:  Plan of Care Reviewed With: patient  Patient Agreement with Plan of Care: agrees

## 2023-02-06 NOTE — ANESTHESIA POSTPROCEDURE EVALUATION
Patient: Estephania Lucas    Procedure Summary     Date: 02/06/23 Room / Location: [facility] OR  /  [facility] OR    Anesthesia Start: 0823 Anesthesia Stop: 0834    Procedure: ELECTROCONVULSIVE THERAPY Diagnosis:       Severe episode of recurrent major depressive disorder, without psychotic features (HCC)      (Severe episode of recurrent major depressive disorder, without psychotic features (HCC) [F33.2])    Surgeons: Gautam Vergara MD Provider: Yair Barnett DO    Anesthesia Type: general ASA Status: 3          Anesthesia Type: general    Vitals  Vitals Value Taken Time   /76 02/06/23 0905   Temp 98.7 °F (37.1 °C) 02/06/23 0855   Pulse 87 02/06/23 0905   Resp 18 02/06/23 0905   SpO2 100 % 02/06/23 0905           Post Anesthesia Care and Evaluation    Patient location during evaluation: PHASE II  Patient participation: complete - patient participated  Level of consciousness: awake and alert and sleepy but conscious  Pain score: 0  Pain management: adequate    Airway patency: patent  Anesthetic complications: No anesthetic complications  PONV Status: controlled  Cardiovascular status: acceptable  Respiratory status: acceptable and nasal cannula  Hydration status: euvolemic  No anesthesia care post op

## 2023-02-06 NOTE — PLAN OF CARE
Goal Outcome Evaluation:   Consent Given to Review Plan with: brother Verduzco  Progress: improving  Outcome Evaluation: Therapist met with patient to review plan of care; patient agreeable.       DATA:      Therapist discussed case with RN and met with patient today to review coping skills, review plan of care, and discuss discharge.     Clinical Maneuvering/Intervention:     Therapist assisted patient in processing above session content; acknowledged and normalized patient’s thoughts, feelings, and concerns.  Discussed the therapist/patient relationship and explain the parameters and limitations of relative confidentiality.  Also discussed the importance of active participation, and honesty to the treatment process.  Encouraged the patient to discuss/vent their feelings, frustrations, and fears concerning their ongoing medical issues and validated their feelings.     Allowed patient to freely discuss issues without interruption or judgment. Provided safe, confidential environment to facilitate the development of positive therapeutic relationship and encourage open, honest communication.      Therapist addressed discharge safety planning this date. Assisted patient in identifying risk factors which would indicate the need for higher level of care after discharge;  including thoughts to harm self or others and/or self-harming behavior. Encouraged patient to call 911, or present to the nearest emergency room should any of these events occur. Discussed crisis intervention services and means to access.  Encouraged securing any objects of harm.    Therapist completed integrated summary, treatment plan, and initiated social history this date.  Therapist is strongly encouraging family involvement in treatment.       Encouraged mask wearing, social distancing, and regular hand washing due to COVID19 risk.      ASSESSMENT:      Therapist met 1:1 with patient today. Patient is cooperative with assessment. She reports  continued depression and anxiety. Patient reports feeling hopeless. She denies any improvement in symptoms and states there is nothing that will help her get better. Patient reports she did not talk to her brother last week about coming to live with him. She plans to call him this week.      PLAN:       Patient to remain hospitalized this date.      Treatment team will focus efforts on stabilizing patient's acute symptoms while providing education on healthy coping and crisis management to reduce hospitalizations.   Patient requires daily psychiatrist evaluation and 24/7 nursing supervision to promote patient  safety.     Therapist will offer 1-4 individual sessions, 1 therapy group daily, family education, and appropriate referral.

## 2023-02-06 NOTE — OP NOTE
ECT OPERATIVE PROCEDURE REPORT      PATIENT NAME: Estephania Lucas    Assistants: None    Primary Surgeon: MELINDA Aldana MD    Preoperative Diagnosis:   Major Depression, Recurrent, Severe Without Psychosis    Postoperative Diagnosis: Same  : 1970    SSN:     MRN#: [id number]    PHYSICIAN: MELINDA ALDANA M.D.    PROCEDURE DATE: 23    PROCEDURE: Bifrontal ECT utilizing Thymatron System IV. % Energy Set  25  %, Charge Delivered  123.9  mC, Current  0.88  A, Stimulus Duration 7.0  Sec, Frequency  20  Hz, Pulse Width 0.50  msec.     This is the patient's  third  Treatment.     ANESTHESIA: See anesthesia report for medications and monitoring.                           Brevital:100        mg          Succinylcholine: 100        mg    DETAILS: Impulse at 0825 with a resultant 42 second seizure.    Specimens Removed: NA  Blood Administered: NA  Estimated Blood Loss: None  Complications: None    Pt STATUS STABLE: 0845  HR    Grafts or Implants: NA    Dictated utilizing Dragon dictation

## 2023-02-06 NOTE — NURSING NOTE
0746 PATIENT OFF FLOOR FOR ECT. PATIENT ACCOMPANIED BY SECURITY, MHT, TRANSPORTER. PATIENT DISPLAYS NO S/S OF ACUTE DISTRESS.

## 2023-02-06 NOTE — ANESTHESIA PREPROCEDURE EVALUATION
Anesthesia Evaluation     Patient summary reviewed and Nursing notes reviewed   no history of anesthetic complications:  NPO Solid Status: > 8 hours  NPO Liquid Status: > 8 hours           Airway   Mallampati: II  TM distance: >3 FB  Neck ROM: full  Dental - normal exam   (+) poor dentition        Pulmonary - normal exam    breath sounds clear to auscultation  (+) a smoker Former, sleep apnea,   Cardiovascular - normal exam  Exercise tolerance: good (4-7 METS)    ECG reviewed  PT is on anticoagulation therapy  Rhythm: regular  Rate: normal    (+) hypertension, past MI , CAD, cardiac stents more than 12 months ago       Neuro/Psych  (+) psychiatric history Depression and Anxiety,    GI/Hepatic/Renal/Endo    (+)  GERD,      Musculoskeletal (-) negative ROS    Abdominal  - normal exam   Substance History   (+) drug use      Comment: History of Substance abuse   OB/GYN negative ob/gyn ROS         Other - negative ROS                         Anesthesia Plan    ASA 3     general   total IV anesthesia  intravenous induction     Anesthetic plan, risks, benefits, and alternatives have been provided, discussed and informed consent has been obtained with: patient.  Pre-procedure education provided  Plan discussed with CRNA.        CODE STATUS:         Lab Results   Component Value Date    WBC 8.43 01/26/2023    HGB 12.7 01/26/2023    HCT 38.0 01/26/2023    MCV 93.6 01/26/2023     01/26/2023       Lab Results   Component Value Date    GLUCOSE 134 (H) 01/27/2023    BUN 17 01/27/2023    CREATININE 0.68 01/27/2023    EGFRIFNONA 77 12/27/2019    BCR 25.0 01/27/2023    K 3.7 01/27/2023    CO2 23.9 01/27/2023    CALCIUM 8.7 01/27/2023    ALBUMIN 3.5 01/27/2023    AST 12 01/27/2023    ALT 10 01/27/2023

## 2023-02-06 NOTE — PLAN OF CARE
Goal Outcome Evaluation:  Plan of Care Reviewed With: patient        Progress: improving  Outcome Evaluation: PT stated her appetite was fair, stated she had been drinking all the boost they send her and would like more. Pt stated she was still not sleeping at night. Pt rated anxiety and depression a 10/10. Pt stated she had no si/hi or avh. Pt was more talkative and interactive with staff this shift. Pt was calm and cooperative with no issues or concerns.

## 2023-02-07 PROCEDURE — 99232 SBSQ HOSP IP/OBS MODERATE 35: CPT | Performed by: PSYCHIATRY & NEUROLOGY

## 2023-02-07 RX ADMIN — MEGESTROL ACETATE 400 MG: 40 SUSPENSION ORAL at 08:52

## 2023-02-07 RX ADMIN — LISINOPRIL 10 MG: 10 TABLET ORAL at 08:52

## 2023-02-07 RX ADMIN — ASPIRIN 81 MG: 81 TABLET, COATED ORAL at 08:43

## 2023-02-07 RX ADMIN — CLOPIDOGREL BISULFATE 75 MG: 75 TABLET, FILM COATED ORAL at 08:43

## 2023-02-07 RX ADMIN — Medication 1 TABLET: at 08:52

## 2023-02-07 NOTE — PLAN OF CARE
Problem: Adult Behavioral Health Plan of Care  Goal: Plan of Care Review  Recent Flowsheet Documentation  Taken 2/7/2023 1417 by Anil Davis, RN  Outcome Evaluation: PATIENT VERBALIZES POOR SLEEP AND APPETITE, SEVERE ANXIETY AND DEPRESSION AS ONLY PROBLEMS THIS SHIFT. PATIENT IS AOX3 AND COOPERATIVE WITH NO S/S OF ACUTE DISTRESS NOTED. PATIENT REMAINS IN HER ROOM, IN BED, FOR ALMOST THE ENTIRE SHIFT. ECT PLANNED FOR 2/8/23  Taken 2/7/2023 0923 by Anil Davis, RN  Plan of Care Reviewed With: patient  Patient Agreement with Plan of Care: agrees   Goal Outcome Evaluation:  Plan of Care Reviewed With: patient  Patient Agreement with Plan of Care: agrees        Outcome Evaluation: PATIENT VERBALIZES POOR SLEEP AND APPETITE, SEVERE ANXIETY AND DEPRESSION AS ONLY PROBLEMS THIS SHIFT. PATIENT IS AOX3 AND COOPERATIVE WITH NO S/S OF ACUTE DISTRESS NOTED. PATIENT REMAINS IN HER ROOM, IN BED, FOR ALMOST THE ENTIRE SHIFT. ECT PLANNED FOR 2/8/23

## 2023-02-07 NOTE — PLAN OF CARE
Goal Outcome Evaluation:  Plan of Care Reviewed With: patient  Patient Agreement with Plan of Care: agrees        Pt anxiety 10, depression 10, calm and cooperative with staff, med compliant.

## 2023-02-07 NOTE — PLAN OF CARE
Goal Outcome Evaluation:   Consent Given to Review Plan with: brother Verduzco  Progress: improving  Outcome Evaluation: Therapist met with patient to review plan of care; patient agreeable.       DATA:      Therapist discussed case with Dr. Vergara and met with patient today to review coping skills, review plan of care, and discuss discharge.    Therapist contacted patient's brother to provide him with an update. He remains supportive of the patient.      Clinical Maneuvering/Intervention:     Therapist assisted patient in processing above session content; acknowledged and normalized patient’s thoughts, feelings, and concerns.  Discussed the therapist/patient relationship and explain the parameters and limitations of relative confidentiality.  Also discussed the importance of active participation, and honesty to the treatment process.  Encouraged the patient to discuss/vent their feelings, frustrations, and fears concerning their ongoing medical issues and validated their feelings.     Allowed patient to freely discuss issues without interruption or judgment. Provided safe, confidential environment to facilitate the development of positive therapeutic relationship and encourage open, honest communication.      Therapist addressed discharge safety planning this date. Assisted patient in identifying risk factors which would indicate the need for higher level of care after discharge;  including thoughts to harm self or others and/or self-harming behavior. Encouraged patient to call 911, or present to the nearest emergency room should any of these events occur. Discussed crisis intervention services and means to access.  Encouraged securing any objects of harm.    Therapist completed integrated summary, treatment plan, and initiated social history this date.  Therapist is strongly encouraging family involvement in treatment.       Encouraged mask wearing, social distancing, and regular hand washing due to COVID19  risk.      ASSESSMENT:      Therapist met 1:1 with patient today. Patient denies suicidal ideation. Patient denies homicidal ideation. Patient denies AVH. Patient is calm and cooperative with assessment. Patient reports no improvement in symptoms; however, her appetite has improved and her affect is more expressive. She reports she didn't sleep last night due to noise on the unit. Patient reports she has not spoken to her brother, but she does plan to move in with him at discharge. Therapist encouraged the patient to contact her brother to discuss this.      PLAN:       Patient to remain hospitalized this date.      Treatment team will focus efforts on stabilizing patient's acute symptoms while providing education on healthy coping and crisis management to reduce hospitalizations.   Patient requires daily psychiatrist evaluation and 24/7 nursing supervision to promote patient  safety.     Therapist will offer 1-4 individual sessions, 1 therapy group daily, family education, and appropriate referral.

## 2023-02-07 NOTE — PROGRESS NOTES
"INPATIENT PSYCHIATRIC PROGRESS NOTE    Name:  Estephania Lucas  :  1970  MRN:  [id number]  Visit Number:  [id number]  Length of stay:  13    Behavioral Health Treatment Plan and Problem List: I have reviewed and approved the Behavioral Health Treatment Plan and Problem list.    SUBJECTIVE    CC/ Focus of exam: depression    Patient's subjective status: \"about the same\"    INTERVAL HISTORY: Patient rating her depression and anxiety as moderate this morning with a glummer of hope. Avoids talking about future plans stating she has not thought that far ahead. When encouraged she said she would eventually like to \"go back to work\" having received SSD benefits the past 10 years based on the Depression Dx. Also plans to live with her brother and his family in [place].   Reviewing her history with her seems she's been through a series of unsuccessful relationships with men, a life style of unhealthy disappointing dependencies. Last relationship ended just prior to her entering the [facility] at the end of December.     Depression rating 6-7/10  Anxiety rating 6-7/10  Sleep: 5 hours of interrupted sleep.     Craving: denies     Agreeable to proceed with ECT. Plan for 2-3 more.     ROS: No cardiovascular, GI, or Neurological complaints.       OBJECTIVE    Vitals:    23   BP: 102/68   Pulse: 87   Resp: 18   Temp: 97.6 °F (36.4 °C)   SpO2: 98%      Temp:  [97.6 °F (36.4 °C)-98.7 °F (37.1 °C)] 97.6 °F (36.4 °C)  Heart Rate:  [] 87  Resp:  [15-18] 18  BP: (102-138)/(62-91) 102/68    MENTAL STATUS EXAM:         Psychomotor: No psychomotor agitation/retardation, No EPS, No motor tics  Speech-normal rate, amount.  Mood/Affect:  Depressed and Restricted  Thought Processes:  coherent  Thought Content:  negativistic  Hallucination(s): none  Hopelessness: No  Optimistic:No  Suicidal Thoughts:   No  Suicidal Plan/Intent:  No  Homicidal Thoughts:  absent  Orientation: oriented x 3  Memory: recent intact    Lab Results " (last 24 hours)     ** No results found for the last 24 hours. **           Imaging Results (Last 24 Hours)     ** No results found for the last 24 hours. **           Lab and x-ray studies reviewed.    ECG/EMG Results (most recent)     Procedure Component Value Units Date/Time    ECG 12 Lead Other [[id number]] Collected: 01/25/23 2144     Updated: 01/29/23 1836     QT Interval 376 ms      QTC Interval 414 ms     Narrative:      Test Reason : Baseline Cardiac Status~  Blood Pressure :   */*   mmHG  Vent. Rate :  73 BPM     Atrial Rate :  73 BPM     P-R Int : 152 ms          QRS Dur :  78 ms      QT Int : 376 ms       P-R-T Axes :  80  68  50 degrees     QTc Int : 414 ms    Normal sinus rhythm  Possible Left atrial enlargement  Anterior infarct , age undetermined  Abnormal ECG  When compared with ECG of 27-DEC-2019 10:47,  No significant change was found  Confirmed by Sammy Chavarria ([id number]) on 1/29/2023 6:34:34 PM    Referred By: BRANDON ALDANA           Confirmed By: Sammy Chavarria    ECG 12 Lead [[id number]] Collected: 01/26/23 1659     Updated: 01/29/23 1846     QT Interval 372 ms      QTC Interval 407 ms     Narrative:      Test Reason : pre ect workup  Blood Pressure :   */*   mmHG  Vent. Rate :  72 BPM     Atrial Rate :  72 BPM     P-R Int : 144 ms          QRS Dur :  94 ms      QT Int : 372 ms       P-R-T Axes :  81  78  86 degrees     QTc Int : 407 ms    Normal sinus rhythm  Normal ECG  When compared with ECG of 25-JAN-2023 21:44, (Unconfirmed)  No significant change was found  Confirmed by Sammy Chavarria ([id number]) on 1/29/2023 6:37:28 PM    Referred By: KATYA           Confirmed By: Sammy Chavarria           ALLERGIES: Patient has no known allergies.    Medications:     aspirin, 81 mg, Oral, Daily  clopidogrel, 75 mg, Oral, Daily  lisinopril, 10 mg, Oral, Daily  megestrol, 400 mg, Oral, Daily  mirtazapine, 30 mg, Oral, Nightly  multivitamin with minerals, 1 tablet, Oral, Daily  nicotine, 1 patch, Transdermal,  Q24H  QUEtiapine, 100 mg, Oral, Nightly       ASSESSMENT & PLAN    Major depressive disorder, recurrent (HCC)  - Tapered off the venlafaxine and paroxetine   - Continue Mirtazapine 30 mg at bedtime and and quetiapine 100 mg hs  - Proceeded with ECT  - pt tolerated well   - To provide for a supportive individual and group psychotherapeutic effort.     Coronary artery disease s/p PCI/stenting in past  - Hospitalist consult 01/26/23 recommended continuing with DAPT - aspirin and Plavix  - EKG without acute changes  - Patient w/o chest pain or anginal equivalent   - Hospitalist consult did advise that patient at acceptable risk for ECT     Hypertension  - Continue with Lisinopril     Substance abuse (HCC)  -   Does not seem to be a current issue      Special precautions: Special Precautions Level 3 (q15 min checks)     Behavioral Health Treatment Plan and Problem List: I have reviewed and approved the Behavioral Health Treatment Plan and Problem list.    I spent a total of 26 minutes in direct patient care including  17 minutes face to face with the patient assessment, coordination of care, and counseling the patient on the current and follow-up treatment plans regarding her status and situation.  Patient had no additional questions.     MELINDA Vergara MD    Clinician:  Gautam Vergara MD  02/07/23  08:27 EST    Dictated utilizing Dragon dictation

## 2023-02-08 ENCOUNTER — ANESTHESIA (OUTPATIENT)
Dept: PERIOP | Facility: HOSPITAL | Age: 53
DRG: 885 | End: 2023-02-08
Payer: MEDICARE

## 2023-02-08 ENCOUNTER — ANESTHESIA EVENT (OUTPATIENT)
Dept: PERIOP | Facility: HOSPITAL | Age: 53
DRG: 885 | End: 2023-02-08
Payer: MEDICARE

## 2023-02-08 PROCEDURE — 90870 ELECTROCONVULSIVE THERAPY: CPT | Performed by: PSYCHIATRY & NEUROLOGY

## 2023-02-08 PROCEDURE — 25010000002 MIDAZOLAM PER 1 MG: Performed by: NURSE ANESTHETIST, CERTIFIED REGISTERED

## 2023-02-08 PROCEDURE — GZB2ZZZ ELECTROCONVULSIVE THERAPY, BILATERAL-SINGLE SEIZURE: ICD-10-PCS | Performed by: PSYCHIATRY & NEUROLOGY

## 2023-02-08 PROCEDURE — 25010000002 ONDANSETRON PER 1 MG: Performed by: NURSE ANESTHETIST, CERTIFIED REGISTERED

## 2023-02-08 PROCEDURE — 25010000002 KETOROLAC TROMETHAMINE PER 15 MG: Performed by: NURSE ANESTHETIST, CERTIFIED REGISTERED

## 2023-02-08 PROCEDURE — 25010000002 SUCCINYLCHOLINE PER 20 MG: Performed by: NURSE ANESTHETIST, CERTIFIED REGISTERED

## 2023-02-08 RX ORDER — SODIUM CHLORIDE, SODIUM LACTATE, POTASSIUM CHLORIDE, CALCIUM CHLORIDE 600; 310; 30; 20 MG/100ML; MG/100ML; MG/100ML; MG/100ML
125 INJECTION, SOLUTION INTRAVENOUS ONCE
Status: DISCONTINUED | OUTPATIENT
Start: 2023-02-08 | End: 2023-02-08 | Stop reason: HOSPADM

## 2023-02-08 RX ORDER — SODIUM CHLORIDE, SODIUM LACTATE, POTASSIUM CHLORIDE, CALCIUM CHLORIDE 600; 310; 30; 20 MG/100ML; MG/100ML; MG/100ML; MG/100ML
100 INJECTION, SOLUTION INTRAVENOUS ONCE AS NEEDED
Status: DISCONTINUED | OUTPATIENT
Start: 2023-02-08 | End: 2023-02-08 | Stop reason: HOSPADM

## 2023-02-08 RX ORDER — OXYCODONE HYDROCHLORIDE AND ACETAMINOPHEN 5; 325 MG/1; MG/1
1 TABLET ORAL ONCE AS NEEDED
Status: DISCONTINUED | OUTPATIENT
Start: 2023-02-08 | End: 2023-02-08 | Stop reason: HOSPADM

## 2023-02-08 RX ORDER — ONDANSETRON 2 MG/ML
INJECTION INTRAMUSCULAR; INTRAVENOUS AS NEEDED
Status: DISCONTINUED | OUTPATIENT
Start: 2023-02-08 | End: 2023-02-08 | Stop reason: SURG

## 2023-02-08 RX ORDER — SODIUM CHLORIDE 0.9 % (FLUSH) 0.9 %
10 SYRINGE (ML) INJECTION EVERY 12 HOURS SCHEDULED
Status: DISCONTINUED | OUTPATIENT
Start: 2023-02-08 | End: 2023-02-08 | Stop reason: HOSPADM

## 2023-02-08 RX ORDER — SODIUM CHLORIDE 9 MG/ML
40 INJECTION, SOLUTION INTRAVENOUS AS NEEDED
Status: DISCONTINUED | OUTPATIENT
Start: 2023-02-08 | End: 2023-02-08 | Stop reason: HOSPADM

## 2023-02-08 RX ORDER — MIDAZOLAM HYDROCHLORIDE 1 MG/ML
INJECTION INTRAMUSCULAR; INTRAVENOUS AS NEEDED
Status: DISCONTINUED | OUTPATIENT
Start: 2023-02-08 | End: 2023-02-08 | Stop reason: SURG

## 2023-02-08 RX ORDER — KETOROLAC TROMETHAMINE 30 MG/ML
30 INJECTION, SOLUTION INTRAMUSCULAR; INTRAVENOUS EVERY 6 HOURS PRN
Status: DISCONTINUED | OUTPATIENT
Start: 2023-02-08 | End: 2023-02-08 | Stop reason: HOSPADM

## 2023-02-08 RX ORDER — KETOROLAC TROMETHAMINE 30 MG/ML
INJECTION, SOLUTION INTRAMUSCULAR; INTRAVENOUS AS NEEDED
Status: DISCONTINUED | OUTPATIENT
Start: 2023-02-08 | End: 2023-02-08 | Stop reason: SURG

## 2023-02-08 RX ORDER — SODIUM CHLORIDE 0.9 % (FLUSH) 0.9 %
10 SYRINGE (ML) INJECTION AS NEEDED
Status: DISCONTINUED | OUTPATIENT
Start: 2023-02-08 | End: 2023-02-08 | Stop reason: HOSPADM

## 2023-02-08 RX ORDER — SUCCINYLCHOLINE CHLORIDE 20 MG/ML
INJECTION INTRAMUSCULAR; INTRAVENOUS AS NEEDED
Status: DISCONTINUED | OUTPATIENT
Start: 2023-02-08 | End: 2023-02-08 | Stop reason: SURG

## 2023-02-08 RX ORDER — IPRATROPIUM BROMIDE AND ALBUTEROL SULFATE 2.5; .5 MG/3ML; MG/3ML
3 SOLUTION RESPIRATORY (INHALATION) ONCE AS NEEDED
Status: DISCONTINUED | OUTPATIENT
Start: 2023-02-08 | End: 2023-02-08 | Stop reason: HOSPADM

## 2023-02-08 RX ORDER — SODIUM CHLORIDE, SODIUM LACTATE, POTASSIUM CHLORIDE, CALCIUM CHLORIDE 600; 310; 30; 20 MG/100ML; MG/100ML; MG/100ML; MG/100ML
INJECTION, SOLUTION INTRAVENOUS CONTINUOUS PRN
Status: DISCONTINUED | OUTPATIENT
Start: 2023-02-08 | End: 2023-02-08 | Stop reason: SURG

## 2023-02-08 RX ORDER — ONDANSETRON 2 MG/ML
4 INJECTION INTRAMUSCULAR; INTRAVENOUS AS NEEDED
Status: DISCONTINUED | OUTPATIENT
Start: 2023-02-08 | End: 2023-02-08 | Stop reason: HOSPADM

## 2023-02-08 RX ORDER — MIDAZOLAM HYDROCHLORIDE 1 MG/ML
1 INJECTION INTRAMUSCULAR; INTRAVENOUS
Status: DISCONTINUED | OUTPATIENT
Start: 2023-02-08 | End: 2023-02-08 | Stop reason: HOSPADM

## 2023-02-08 RX ADMIN — QUETIAPINE FUMARATE 100 MG: 100 TABLET ORAL at 20:43

## 2023-02-08 RX ADMIN — KETOROLAC TROMETHAMINE 30 MG: 30 INJECTION, SOLUTION INTRAMUSCULAR; INTRAVENOUS at 09:31

## 2023-02-08 RX ADMIN — Medication 10 ML: at 04:23

## 2023-02-08 RX ADMIN — SODIUM CHLORIDE, POTASSIUM CHLORIDE, SODIUM LACTATE AND CALCIUM CHLORIDE: 600; 310; 30; 20 INJECTION, SOLUTION INTRAVENOUS at 09:15

## 2023-02-08 RX ADMIN — METHOHEXITAL SODIUM 100 MG: 500 INJECTION, POWDER, LYOPHILIZED, FOR SOLUTION INTRAMUSCULAR; INTRAVENOUS; RECTAL at 09:29

## 2023-02-08 RX ADMIN — MIRTAZAPINE 30 MG: 15 TABLET, FILM COATED ORAL at 20:43

## 2023-02-08 RX ADMIN — ONDANSETRON 4 MG: 2 INJECTION INTRAMUSCULAR; INTRAVENOUS at 09:36

## 2023-02-08 RX ADMIN — MIDAZOLAM 2 MG: 1 INJECTION INTRAMUSCULAR; INTRAVENOUS at 09:31

## 2023-02-08 RX ADMIN — SUCCINYLCHOLINE CHLORIDE 100 MG: 20 INJECTION, SOLUTION INTRAMUSCULAR; INTRAVENOUS at 09:29

## 2023-02-08 NOTE — PLAN OF CARE
Problem: Adult Behavioral Health Plan of Care  Goal: Plan of Care Review  Recent Flowsheet Documentation  Taken 2/8/2023 1411 by Anil Davis, RN  Progress: no change  Outcome Evaluation: PATIENT VERBALIZES SEVERE ANXIETY AND DEPRESSION AS ONLY PROBLEMS THIS SHIFT. PATIENT IS AOX3, COOPERATIVE WITH NO S/S OF ACUTE DISTRESS NOTED. PATIENT HAD ECT THIS SHIFT AND TOLERATED WELL.  Taken 2/8/2023 1043 by Anil Davis, RN  Plan of Care Reviewed With: patient  Patient Agreement with Plan of Care: agrees  Taken 2/8/2023 0736 by Anil Davis, RN  Plan of Care Reviewed With: patient  Patient Agreement with Plan of Care: agrees   Goal Outcome Evaluation:  Plan of Care Reviewed With: patient  Patient Agreement with Plan of Care: agrees     Progress: no change  Outcome Evaluation: PATIENT VERBALIZES SEVERE ANXIETY AND DEPRESSION AS ONLY PROBLEMS THIS SHIFT. PATIENT IS AOX3, COOPERATIVE WITH NO S/S OF ACUTE DISTRESS NOTED. PATIENT HAD ECT THIS SHIFT AND TOLERATED WELL.

## 2023-02-08 NOTE — PROGRESS NOTES
Patient has not improved subjectively but has shown behavioral improvement with more appropriate interactions with staff and peer, improved appetite, and affect all be it minimal. Slept 8 hours per nursing, patient reports poor sleep.   Patient in agreement regarding ECT later today.

## 2023-02-08 NOTE — PLAN OF CARE
Goal Outcome Evaluation:  Plan of Care Reviewed With: patient        Progress: improving  Outcome Evaluation: Pt stated she still has poor sleep and appetite. Pt rated anxiety and depression a 10/10. Pt had no si/hi or avh. Pt was oriented to person, place and time. Pt has done better about coming out in the dayroom for vital signs and snacks and talking more with staff, pt has even smiled at staff while talking and at time of note pt is in bed sleeping. Pt was calm and cooperative this shift with no issues or concerns.

## 2023-02-08 NOTE — ANESTHESIA PREPROCEDURE EVALUATION
Anesthesia Evaluation     Patient summary reviewed and Nursing notes reviewed   history of anesthetic complications: PONV  NPO Solid Status: > 8 hours  NPO Liquid Status: > 8 hours           Airway   Mallampati: II  TM distance: >3 FB  Neck ROM: full  Dental - normal exam   (+) poor dentition        Pulmonary - normal exam    breath sounds clear to auscultation  (+) sleep apnea,   Cardiovascular - normal exam  Exercise tolerance: good (4-7 METS)    ECG reviewed  PT is on anticoagulation therapy  Rhythm: regular  Rate: normal    (+) hypertension, past MI , CAD,       Neuro/Psych  (+) psychiatric history Depression and Anxiety,    GI/Hepatic/Renal/Endo    (+)  GERD,      Musculoskeletal (-) negative ROS    Abdominal  - normal exam   Substance History - negative use      Comment: History of Substance abuse   OB/GYN negative ob/gyn ROS         Other - negative ROS                         Anesthesia Plan    ASA 3     general   total IV anesthesia  intravenous induction     Anesthetic plan, risks, benefits, and alternatives have been provided, discussed and informed consent has been obtained with: patient.  Pre-procedure education provided  Plan discussed with CRNA.        CODE STATUS:         Lab Results   Component Value Date    WBC 8.43 01/26/2023    HGB 12.7 01/26/2023    HCT 38.0 01/26/2023    MCV 93.6 01/26/2023     01/26/2023       Lab Results   Component Value Date    GLUCOSE 134 (H) 01/27/2023    BUN 17 01/27/2023    CREATININE 0.68 01/27/2023    EGFRIFNONA 77 12/27/2019    BCR 25.0 01/27/2023    K 3.7 01/27/2023    CO2 23.9 01/27/2023    CALCIUM 8.7 01/27/2023    ALBUMIN 3.5 01/27/2023    AST 12 01/27/2023    ALT 10 01/27/2023

## 2023-02-08 NOTE — OP NOTE
ECT OPERATIVE PROCEDURE REPORT      PATIENT NAME: Estephania Lucas    Assistants: None    Primary Surgeon: MELINDA Aldana MD    Preoperative Diagnosis:   Major Depression, Recurrent, Severe Without Psychosis    Postoperative Diagnosis: Same  : 1970    SSN:     MRN#: [id number]    PHYSICIAN: MELINDA ALDANA M.D.    PROCEDURE DATE: 23    PROCEDURE: Bifrontal ECT utilizing Thymatron System IV. % Energy Set  25  %, Charge Delivered  124.0  mC, Current  0.89  A, Stimulus Duration 7.0    Sec, Frequency 20  Hz, Pulse Width 0.50  msec.     This is the patient's  forth  Treatment.     ANESTHESIA: See anesthesia report for medications and monitoring.                           Brevital:100        mg          Succinylcholine: 100        mg    DETAILS: Impulse at 0925 with a resultant 23 second seizure.    Specimens Removed: NA  Blood Administered: NA  Estimated Blood Loss: None  Complications: None    Pt STATUS STABLE: 0945  HR    Grafts or Implants: NA    Dictated utilizing Dragon dictation

## 2023-02-08 NOTE — ANESTHESIA POSTPROCEDURE EVALUATION
Patient: Estephania Lucas    Procedure Summary     Date: 02/08/23 Room / Location:  [facility]  /  [facility]    Anesthesia Start: 0915 Anesthesia Stop: 0935    Procedure: ELECTROCONVULSIVE THERAPY Diagnosis:       Severe episode of recurrent major depressive disorder, without psychotic features (HCC)      (Severe episode of recurrent major depressive disorder, without psychotic features (HCC) [F33.2])    Surgeons: Gautam Vergara MD Provider: Yair Barnett DO    Anesthesia Type: general ASA Status: 3          Anesthesia Type: general    Vitals  Vitals Value Taken Time   /75 02/08/23 1005   Temp 97.6 °F (36.4 °C) 02/08/23 1005   Pulse 87 02/08/23 1005   Resp 15 02/08/23 1005   SpO2 97 % 02/08/23 1005           Anesthesia Post Evaluation

## 2023-02-08 NOTE — PLAN OF CARE
Goal Outcome Evaluation:   Consent Given to Review Plan with: brother Verduzco  Progress: improving  Outcome Evaluation: Therapist met with patient to review plan of care; patient agreeable.       DATA:      Therapist discussed case with RN and met with patient today to review coping skills, review plan of care, and discuss discharge.     Clinical Maneuvering/Intervention:     Therapist assisted patient in processing above session content; acknowledged and normalized patient’s thoughts, feelings, and concerns.  Discussed the therapist/patient relationship and explain the parameters and limitations of relative confidentiality.  Also discussed the importance of active participation, and honesty to the treatment process.  Encouraged the patient to discuss/vent their feelings, frustrations, and fears concerning their ongoing medical issues and validated their feelings.     Allowed patient to freely discuss issues without interruption or judgment. Provided safe, confidential environment to facilitate the development of positive therapeutic relationship and encourage open, honest communication.      Therapist addressed discharge safety planning this date. Assisted patient in identifying risk factors which would indicate the need for higher level of care after discharge;  including thoughts to harm self or others and/or self-harming behavior. Encouraged patient to call 911, or present to the nearest emergency room should any of these events occur. Discussed crisis intervention services and means to access.  Encouraged securing any objects of harm.    Therapist completed integrated summary, treatment plan, and initiated social history this date.  Therapist is strongly encouraging family involvement in treatment.       Encouraged mask wearing, social distancing, and regular hand washing due to COVID19 risk.      ASSESSMENT:      Therapist met 1:1 with patient today. Patient denies SI/HI/AVH. Patient is largely  "uncooperative and dismissive during session. She reports she doesn't feel well today, stating \"just everything\" is bothering her. When therapist encouraged the patient to elaborate, she states her body and \"everything\" hurts. Therapist attempted to discuss patient's symptoms of depression and anxiety. Patient states she has not had any improvements in symptoms. Therapist asked the patient if something specific has been bothering her, to which she answered \"everything, everything sucks.\" Patient refused to discuss a specific stressor or concern.      PLAN:       Patient to remain hospitalized this date.      Treatment team will focus efforts on stabilizing patient's acute symptoms while providing education on healthy coping and crisis management to reduce hospitalizations.   Patient requires daily psychiatrist evaluation and 24/7 nursing supervision to promote patient  safety.     Therapist will offer 1-4 individual sessions, 1 therapy group daily, family education, and appropriate referral.    "

## 2023-02-09 PROCEDURE — 99232 SBSQ HOSP IP/OBS MODERATE 35: CPT | Performed by: PSYCHIATRY & NEUROLOGY

## 2023-02-09 RX ORDER — MIRTAZAPINE 15 MG/1
45 TABLET, FILM COATED ORAL NIGHTLY
Status: DISCONTINUED | OUTPATIENT
Start: 2023-02-09 | End: 2023-02-14 | Stop reason: HOSPADM

## 2023-02-09 RX ADMIN — ASPIRIN 81 MG: 81 TABLET, COATED ORAL at 09:10

## 2023-02-09 RX ADMIN — CLOPIDOGREL BISULFATE 75 MG: 75 TABLET, FILM COATED ORAL at 09:09

## 2023-02-09 RX ADMIN — MEGESTROL ACETATE 400 MG: 40 SUSPENSION ORAL at 09:09

## 2023-02-09 RX ADMIN — Medication 1 TABLET: at 09:09

## 2023-02-09 RX ADMIN — LISINOPRIL 10 MG: 10 TABLET ORAL at 09:09

## 2023-02-09 NOTE — PROGRESS NOTES
"INPATIENT PSYCHIATRIC PROGRESS NOTE    Name:  Estephania Lucas  :  1970  MRN:  [id number]  Visit Number:  [id number]  Length of stay:  15    Behavioral Health Treatment Plan and Problem List: I have reviewed and approved the Behavioral Health Treatment Plan and Problem list.    SUBJECTIVE    CC/ Focus of exam: depression    Patient's subjective status: \"About the same\"    INTERVAL HISTORY: The patient's subjective status has not improved now with 4 ECT's plus the mirtazapine/Seroquel. She voices a sense of hope and a desire to improve not reflected by her behavior continuing to isolate despite efforts by nursing. Denies SI. Visited by brother yesterday who reaffirmed his support and concern.     Depression rating 10/10  Anxiety rating 5/10  Sleep: Slept 7 hours per nursing     ROS: No cardiovascular, GI, or Neurological complaints.       OBJECTIVE    Vitals:    23 0731   BP: 111/74   Pulse: 104   Resp:    Temp:    SpO2:       Temp:  [97.6 °F (36.4 °C)-99.8 °F (37.7 °C)] 98 °F (36.7 °C)  Heart Rate:  [] 104  Resp:  [15-21] 18  BP: (107-133)/(69-92) 111/74    MENTAL STATUS EXAM:         Psychomotor: No psychomotor agitation/retardation, No EPS, No motor tics  Speech-normal rate, amount.  Mood/Affect:  Restricted  Thought Processes:  coherent  Thought Content:  negativistic  Hallucination(s): none  Hopelessness: No  Optimistic:No  Suicidal Thoughts:   No  Suicidal Plan/Intent:  No  Homicidal Thoughts:  absent  Orientation: oriented x 3  Memory: recent intact    Lab Results (last 24 hours)     ** No results found for the last 24 hours. **           Imaging Results (Last 24 Hours)     ** No results found for the last 24 hours. **           Lab and x-ray studies reviewed.    ECG/EMG Results (most recent)     Procedure Component Value Units Date/Time    ECG 12 Lead Other [[id number]] Collected: 23     Updated: 23     QT Interval 376 ms      QTC Interval 414 ms     Narrative:      Test " Reason : Baseline Cardiac Status~  Blood Pressure :   */*   mmHG  Vent. Rate :  73 BPM     Atrial Rate :  73 BPM     P-R Int : 152 ms          QRS Dur :  78 ms      QT Int : 376 ms       P-R-T Axes :  80  68  50 degrees     QTc Int : 414 ms    Normal sinus rhythm  Possible Left atrial enlargement  Anterior infarct , age undetermined  Abnormal ECG  When compared with ECG of 27-DEC-2019 10:47,  No significant change was found  Confirmed by Sammy Chavarria ([id number]) on 1/29/2023 6:34:34 PM    Referred By: BRANDON ALDANA           Confirmed By: Sammy Chavarria    ECG 12 Lead [[id number]] Collected: 01/26/23 1659     Updated: 01/29/23 1846     QT Interval 372 ms      QTC Interval 407 ms     Narrative:      Test Reason : pre ect workup  Blood Pressure :   */*   mmHG  Vent. Rate :  72 BPM     Atrial Rate :  72 BPM     P-R Int : 144 ms          QRS Dur :  94 ms      QT Int : 372 ms       P-R-T Axes :  81  78  86 degrees     QTc Int : 407 ms    Normal sinus rhythm  Normal ECG  When compared with ECG of 25-JAN-2023 21:44, (Unconfirmed)  No significant change was found  Confirmed by Sammy Chavarria ([id number]) on 1/29/2023 6:37:28 PM    Referred By: KATYA           Confirmed By: Sammy Chavarria           ALLERGIES: Patient has no known allergies.    Medications:     aspirin, 81 mg, Oral, Daily  clopidogrel, 75 mg, Oral, Daily  lisinopril, 10 mg, Oral, Daily  megestrol, 400 mg, Oral, Daily  mirtazapine, 30 mg, Oral, Nightly  multivitamin with minerals, 1 tablet, Oral, Daily  nicotine, 1 patch, Transdermal, Q24H  QUEtiapine, 100 mg, Oral, Nightly       ASSESSMENT & PLAN    Major depressive disorder, recurrent (HCC)  - Tapered off the venlafaxine and paroxetine   -  increased Mirtazapine to 45 mg at bedtime,  quetiapine 100 mg hs  - Proceeded with ECT, patient agreeable.     - To provide for a supportive individual and group psychotherapeutic effort.     Coronary artery disease s/p PCI/stenting in past  - Hospitalist consult 01/26/23 recommended  continuing with DAPT - aspirin and Plavix  - EKG without acute changes  - Patient w/o chest pain or anginal equivalent   - Hospitalist consult did advise that patient at acceptable risk for ECT     Hypertension  - Continue with Lisinopril     Substance abuse (HCC)  -   Does not seem to be a current issue      Special precautions: Special Precautions Level 3 (q15 min checks)     Behavioral Health Treatment Plan and Problem List: I have reviewed and approved the Behavioral Health Treatment Plan and Problem list.    I spent a total of 26 minutes in direct patient care including  17 minutes face to face with the patient assessment, coordination of care, and counseling the patient on the current and follow-up treatment plans regarding her status and situation.  Patient had no additional questions.     MELINDA Vergara MD    Clinician:  Gautam Vergara MD  02/09/23  09:29 EST    Dictated utilizing Dragon dictation

## 2023-02-09 NOTE — PLAN OF CARE
Goal Outcome Evaluation:   Consent Given to Review Plan with: brother Verduzco  Progress: improving  Outcome Evaluation: Therapist met with patient to review plan of care; patient agreeable.        DATA:      Therapist discussed case with RN and met with patient today to review coping skills, review plan of care, and discuss discharge.    Therapist provided patient with psychoeducation and reading material on DBT exercises.      Clinical Maneuvering/Intervention:     Therapist assisted patient in processing above session content; acknowledged and normalized patient’s thoughts, feelings, and concerns.  Discussed the therapist/patient relationship and explain the parameters and limitations of relative confidentiality.  Also discussed the importance of active participation, and honesty to the treatment process.  Encouraged the patient to discuss/vent their feelings, frustrations, and fears concerning their ongoing medical issues and validated their feelings.     Allowed patient to freely discuss issues without interruption or judgment. Provided safe, confidential environment to facilitate the development of positive therapeutic relationship and encourage open, honest communication.      Therapist addressed discharge safety planning this date. Assisted patient in identifying risk factors which would indicate the need for higher level of care after discharge;  including thoughts to harm self or others and/or self-harming behavior. Encouraged patient to call 911, or present to the nearest emergency room should any of these events occur. Discussed crisis intervention services and means to access.  Encouraged securing any objects of harm.    Therapist completed integrated summary, treatment plan, and initiated social history this date.  Therapist is strongly encouraging family involvement in treatment.       Encouraged mask wearing, social distancing, and regular hand washing due to COVID19 risk.      ASSESSMENT:     "  Therapist met 1:1 with patient today. Patient denies suicidal ideation. Patient denies homicidal ideation. Patient denies AVH. Patient is a bit more cooperative and engaged with session today. Continues to be somewhat irritable. She states she doesn't feel any better. Patient reports she has had a lot on her mind. When encouraged to elaborate, patient states \"I think about why this happened to me. I didn't do anything to deserve a life like this.\" She states she is angry and bitter. Therapist and patient discussed accepting her situation for what it is and focusing her efforts on improving. Patient does not appear motivated to do so.      PLAN:       Patient to remain hospitalized this date.      Treatment team will focus efforts on stabilizing patient's acute symptoms while providing education on healthy coping and crisis management to reduce hospitalizations.   Patient requires daily psychiatrist evaluation and 24/7 nursing supervision to promote patient  safety.     Therapist will offer 1-4 individual sessions, 1 therapy group daily, family education, and appropriate referral.    "

## 2023-02-09 NOTE — PLAN OF CARE
Goal Outcome Evaluation:  Plan of Care Reviewed With: patient  Patient Agreement with Plan of Care: agrees     Progress: no change  Outcome Evaluation: Patient continues to be withdrawn to her room, only coming out to eat meals and receive medications. Patient rates anxiety and depression both a 10. Denies SI/HI or AVH. Patient is scheduled for ECT in the AM.

## 2023-02-09 NOTE — PLAN OF CARE
Date:  7/28/2021    Medication:   WELLBUTRIN XL 300MG TABLETS    Message to Prescriber: Per the fax: Plan oes not cover this medication. Please call plan at [phone] to initiate prior authorization or call/fax pharmacy to change medication. Patient ID# is [id number].    Reference #/CoverMyMeds KeyCode:     Insurance ID:  [id number]    PA Company Phone:      Pharmacy Name:  [facility]    Pharmacy Phone/Fax Number:  [phone]    [x] PA request forwarded to nurse/provider on date 7/28/2021         Goal Outcome Evaluation:  Plan of Care Reviewed With: patient  Patient Agreement with Plan of Care: agrees     Progress: no change  Outcome Evaluation: Pt spent almost entire shift lying in bed. Attempted to get pt to come out for snack and socialization. Pt refused. Pt reported sleep and appetite poor. Rated anxiety and depression both 10 on 0-10 scale. Pt denied any SI/HI/AVH.

## 2023-02-10 ENCOUNTER — ANESTHESIA EVENT (OUTPATIENT)
Dept: PERIOP | Facility: HOSPITAL | Age: 53
DRG: 885 | End: 2023-02-10
Payer: MEDICARE

## 2023-02-10 ENCOUNTER — ANESTHESIA (OUTPATIENT)
Dept: PERIOP | Facility: HOSPITAL | Age: 53
DRG: 885 | End: 2023-02-10
Payer: MEDICARE

## 2023-02-10 PROCEDURE — 25010000002 KETOROLAC TROMETHAMINE PER 15 MG: Performed by: NURSE ANESTHETIST, CERTIFIED REGISTERED

## 2023-02-10 PROCEDURE — 90870 ELECTROCONVULSIVE THERAPY: CPT | Performed by: PSYCHIATRY & NEUROLOGY

## 2023-02-10 PROCEDURE — 25010000002 MIDAZOLAM PER 1 MG: Performed by: NURSE ANESTHETIST, CERTIFIED REGISTERED

## 2023-02-10 PROCEDURE — GZB2ZZZ ELECTROCONVULSIVE THERAPY, BILATERAL-SINGLE SEIZURE: ICD-10-PCS | Performed by: PSYCHIATRY & NEUROLOGY

## 2023-02-10 PROCEDURE — 25010000002 SUCCINYLCHOLINE PER 20 MG: Performed by: NURSE ANESTHETIST, CERTIFIED REGISTERED

## 2023-02-10 PROCEDURE — 25010000002 ONDANSETRON PER 1 MG: Performed by: NURSE ANESTHETIST, CERTIFIED REGISTERED

## 2023-02-10 RX ORDER — ONDANSETRON 2 MG/ML
INJECTION INTRAMUSCULAR; INTRAVENOUS AS NEEDED
Status: DISCONTINUED | OUTPATIENT
Start: 2023-02-10 | End: 2023-02-10 | Stop reason: SURG

## 2023-02-10 RX ORDER — SODIUM CHLORIDE 0.9 % (FLUSH) 0.9 %
10 SYRINGE (ML) INJECTION AS NEEDED
Status: CANCELLED | OUTPATIENT
Start: 2023-02-13

## 2023-02-10 RX ORDER — OXYCODONE HYDROCHLORIDE AND ACETAMINOPHEN 5; 325 MG/1; MG/1
1 TABLET ORAL ONCE AS NEEDED
Status: DISCONTINUED | OUTPATIENT
Start: 2023-02-10 | End: 2023-02-10 | Stop reason: HOSPADM

## 2023-02-10 RX ORDER — ONDANSETRON 2 MG/ML
4 INJECTION INTRAMUSCULAR; INTRAVENOUS AS NEEDED
Status: DISCONTINUED | OUTPATIENT
Start: 2023-02-10 | End: 2023-02-10 | Stop reason: HOSPADM

## 2023-02-10 RX ORDER — SODIUM CHLORIDE 0.9 % (FLUSH) 0.9 %
10 SYRINGE (ML) INJECTION EVERY 12 HOURS SCHEDULED
Status: DISCONTINUED | OUTPATIENT
Start: 2023-02-10 | End: 2023-02-10 | Stop reason: HOSPADM

## 2023-02-10 RX ORDER — MIDAZOLAM HYDROCHLORIDE 1 MG/ML
INJECTION INTRAMUSCULAR; INTRAVENOUS AS NEEDED
Status: DISCONTINUED | OUTPATIENT
Start: 2023-02-10 | End: 2023-02-10 | Stop reason: SURG

## 2023-02-10 RX ORDER — SODIUM CHLORIDE, SODIUM LACTATE, POTASSIUM CHLORIDE, CALCIUM CHLORIDE 600; 310; 30; 20 MG/100ML; MG/100ML; MG/100ML; MG/100ML
100 INJECTION, SOLUTION INTRAVENOUS ONCE AS NEEDED
Status: DISCONTINUED | OUTPATIENT
Start: 2023-02-10 | End: 2023-02-10 | Stop reason: HOSPADM

## 2023-02-10 RX ORDER — SODIUM CHLORIDE 9 MG/ML
40 INJECTION, SOLUTION INTRAVENOUS AS NEEDED
Status: CANCELLED | OUTPATIENT
Start: 2023-02-13

## 2023-02-10 RX ORDER — SODIUM CHLORIDE 0.9 % (FLUSH) 0.9 %
10 SYRINGE (ML) INJECTION EVERY 12 HOURS SCHEDULED
Status: CANCELLED | OUTPATIENT
Start: 2023-02-13

## 2023-02-10 RX ORDER — SODIUM CHLORIDE 0.9 % (FLUSH) 0.9 %
10 SYRINGE (ML) INJECTION AS NEEDED
Status: DISCONTINUED | OUTPATIENT
Start: 2023-02-10 | End: 2023-02-10 | Stop reason: HOSPADM

## 2023-02-10 RX ORDER — IPRATROPIUM BROMIDE AND ALBUTEROL SULFATE 2.5; .5 MG/3ML; MG/3ML
3 SOLUTION RESPIRATORY (INHALATION) ONCE AS NEEDED
Status: DISCONTINUED | OUTPATIENT
Start: 2023-02-10 | End: 2023-02-10 | Stop reason: HOSPADM

## 2023-02-10 RX ORDER — SODIUM CHLORIDE 9 MG/ML
40 INJECTION, SOLUTION INTRAVENOUS AS NEEDED
Status: DISCONTINUED | OUTPATIENT
Start: 2023-02-10 | End: 2023-02-10 | Stop reason: HOSPADM

## 2023-02-10 RX ORDER — KETOROLAC TROMETHAMINE 30 MG/ML
INJECTION, SOLUTION INTRAMUSCULAR; INTRAVENOUS AS NEEDED
Status: DISCONTINUED | OUTPATIENT
Start: 2023-02-10 | End: 2023-02-10 | Stop reason: SURG

## 2023-02-10 RX ORDER — KETOROLAC TROMETHAMINE 30 MG/ML
30 INJECTION, SOLUTION INTRAMUSCULAR; INTRAVENOUS EVERY 6 HOURS PRN
Status: DISCONTINUED | OUTPATIENT
Start: 2023-02-10 | End: 2023-02-10 | Stop reason: HOSPADM

## 2023-02-10 RX ORDER — SUCCINYLCHOLINE CHLORIDE 20 MG/ML
INJECTION INTRAMUSCULAR; INTRAVENOUS AS NEEDED
Status: DISCONTINUED | OUTPATIENT
Start: 2023-02-10 | End: 2023-02-10 | Stop reason: SURG

## 2023-02-10 RX ADMIN — QUETIAPINE FUMARATE 100 MG: 100 TABLET ORAL at 20:29

## 2023-02-10 RX ADMIN — ONDANSETRON 4 MG: 2 INJECTION INTRAMUSCULAR; INTRAVENOUS at 08:29

## 2023-02-10 RX ADMIN — METHOHEXITAL SODIUM 100 MG: 500 INJECTION, POWDER, LYOPHILIZED, FOR SOLUTION INTRAMUSCULAR; INTRAVENOUS; RECTAL at 08:26

## 2023-02-10 RX ADMIN — MIRTAZAPINE 45 MG: 15 TABLET, FILM COATED ORAL at 20:29

## 2023-02-10 RX ADMIN — MIDAZOLAM 2 MG: 1 INJECTION INTRAMUSCULAR; INTRAVENOUS at 08:32

## 2023-02-10 RX ADMIN — KETOROLAC TROMETHAMINE 30 MG: 30 INJECTION, SOLUTION INTRAMUSCULAR; INTRAVENOUS at 08:29

## 2023-02-10 RX ADMIN — SUCCINYLCHOLINE CHLORIDE 100 MG: 20 INJECTION, SOLUTION INTRAMUSCULAR; INTRAVENOUS at 08:26

## 2023-02-10 NOTE — OP NOTE
ECT OPERATIVE PROCEDURE REPORT      PATIENT NAME: Estephania Lucas    Assistants: None    Primary Surgeon: MELINDA Aldana MD    Preoperative Diagnosis:   Major Depression, Recurrent, Severe Without Psychosis    Postoperative Diagnosis: Same  : 1970    SSN:     MRN#: [id number]    PHYSICIAN: MELINDA ALDANA M.D.    PROCEDURE DATE: 02/10/23    PROCEDURE: Bifrontal ECT utilizing Thymatron System IV. % Energy Set  26  %, Charge Delivered  124.2  mC, Current  0.89  A, Stimulus Duration 7.0  Sec, Frequency  20  Hz, Pulse Width 0.50  msec.     This is the patient's  fifth   Treatment.     ANESTHESIA: See anesthesia report for medications and monitoring.                           Brevital:*100**        mg          Succinylcholine: 100        mg    DETAILS: Impulse at 0822 with a resultant 25 second seizure.    Specimens Removed: NA  Blood Administered: NA  Estimated Blood Loss: None  Complications: None    Pt STATUS STABLE: 0900  HR    Grafts or Implants: NA    Dictated utilizing Dragon dictation

## 2023-02-10 NOTE — NURSING NOTE
Positive gag reflex noted, Patient requesting boost and cracker, denies complaints, will continue to monitor

## 2023-02-10 NOTE — ANESTHESIA POSTPROCEDURE EVALUATION
Patient: Estephania Lucas    Procedure Summary     Date: 02/10/23 Room / Location: [facility] OR  /  [facility] OR    Anesthesia Start: 0822 Anesthesia Stop: 0834    Procedure: ELECTROCONVULSIVE THERAPY Diagnosis:       Severe episode of recurrent major depressive disorder, without psychotic features (HCC)      (Severe episode of recurrent major depressive disorder, without psychotic features (HCC) [F33.2])    Surgeons: Gautam Vergara MD Provider: Sophy Willoughby CRNA    Anesthesia Type: general ASA Status: 3          Anesthesia Type: general    Vitals  Vitals Value Taken Time   /74 02/10/23 0903   Temp 97.1 °F (36.2 °C) 02/10/23 0903   Pulse 89 02/10/23 0903   Resp 18 02/10/23 0903   SpO2 98 % 02/10/23 0903           Post Anesthesia Care and Evaluation    Patient location during evaluation: PACU  Patient participation: complete - patient participated  Level of consciousness: sleepy but conscious  Pain score: 0  Pain management: adequate    Airway patency: patent  Anesthetic complications: No anesthetic complications  PONV Status: controlled  Cardiovascular status: acceptable  Respiratory status: acceptable and nasal cannula  Hydration status: euvolemic  No anesthesia care post op

## 2023-02-10 NOTE — PLAN OF CARE
Goal Outcome Evaluation:  Plan of Care Reviewed With: patient  Patient Agreement with Plan of Care: agrees        Outcome Evaluation: Patient reports anxiety and depression at 10.  Denies SI/HI or AVH.  Patient continues to isolate in room.  Patient scheduled for ECT in a.m.

## 2023-02-10 NOTE — ANESTHESIA PREPROCEDURE EVALUATION
Anesthesia Evaluation     Patient summary reviewed and Nursing notes reviewed   no history of anesthetic complications:  NPO Solid Status: > 8 hours  NPO Liquid Status: > 8 hours           Airway   Mallampati: II  TM distance: >3 FB  Neck ROM: full  Dental - normal exam   (+) poor dentition        Pulmonary - normal exam    breath sounds clear to auscultation  (+) sleep apnea,   Cardiovascular - normal exam  Exercise tolerance: good (4-7 METS)    ECG reviewed  PT is on anticoagulation therapy  Rhythm: regular  Rate: normal    (+) hypertension, past MI , CAD,       Neuro/Psych  (+) psychiatric history Depression and Anxiety,    GI/Hepatic/Renal/Endo    (+)  GERD,      Musculoskeletal (-) negative ROS    Abdominal  - normal exam   Substance History - negative use      Comment: History of Substance abuse   OB/GYN negative ob/gyn ROS         Other - negative ROS                         Anesthesia Plan    ASA 3     general   total IV anesthesia  intravenous induction     Anesthetic plan, risks, benefits, and alternatives have been provided, discussed and informed consent has been obtained with: patient.  Pre-procedure education provided  Plan discussed with CRNA.        CODE STATUS:         Lab Results   Component Value Date    WBC 8.43 01/26/2023    HGB 12.7 01/26/2023    HCT 38.0 01/26/2023    MCV 93.6 01/26/2023     01/26/2023       Lab Results   Component Value Date    GLUCOSE 134 (H) 01/27/2023    BUN 17 01/27/2023    CREATININE 0.68 01/27/2023    EGFRIFNONA 77 12/27/2019    BCR 25.0 01/27/2023    K 3.7 01/27/2023    CO2 23.9 01/27/2023    CALCIUM 8.7 01/27/2023    ALBUMIN 3.5 01/27/2023    AST 12 01/27/2023    ALT 10 01/27/2023

## 2023-02-11 PROCEDURE — 99232 SBSQ HOSP IP/OBS MODERATE 35: CPT | Performed by: PSYCHIATRY & NEUROLOGY

## 2023-02-11 RX ORDER — LISINOPRIL 10 MG/1
5 TABLET ORAL DAILY
Status: DISCONTINUED | OUTPATIENT
Start: 2023-02-12 | End: 2023-02-11

## 2023-02-11 RX ADMIN — ASPIRIN 81 MG: 81 TABLET, COATED ORAL at 08:36

## 2023-02-11 RX ADMIN — QUETIAPINE FUMARATE 100 MG: 100 TABLET ORAL at 22:19

## 2023-02-11 RX ADMIN — MEGESTROL ACETATE 400 MG: 40 SUSPENSION ORAL at 08:39

## 2023-02-11 RX ADMIN — MIRTAZAPINE 45 MG: 15 TABLET, FILM COATED ORAL at 22:19

## 2023-02-11 RX ADMIN — CLOPIDOGREL BISULFATE 75 MG: 75 TABLET, FILM COATED ORAL at 08:36

## 2023-02-11 RX ADMIN — Medication 1 TABLET: at 08:36

## 2023-02-11 NOTE — NURSING NOTE
"Patient declining Lisinopril, states \" don't want to drop lower\"educated Patient, continues to decline , will notify Dr. Barrientos   "

## 2023-02-11 NOTE — NURSING NOTE
Patient continues to be withdrawn, displaying depressed mood, low energy, remaining in room most of day although encouraged to come to day room .Patient denies suicidal or homicidal ideation

## 2023-02-11 NOTE — PLAN OF CARE
Goal Outcome Evaluation:  Plan of Care Reviewed With: patient  Patient Agreement with Plan of Care: agrees        Pt states anxiety 10, depression 10. Pt is calm and cooperative with staff, med compliant.

## 2023-02-11 NOTE — PLAN OF CARE
Goal Outcome Evaluation:  Plan of Care Reviewed With: patient  Patient Agreement with Plan of Care: agrees         Patient in room , withdrawn, encouraged to take shower , get dressed , hygeine products provided, however, continues to decline hygiene.  Patient encouraged to come out of room to day room, continues to decline. Patient continues to endorse anxiety at 10, depression at 10 on scale of 1-10 with 10 being worse. Patient denies suicidal or homicidal ideationPatient in room , withdrawn, encouraged to take shower , get dressed , hygeine products provided, however, continues to decline. Patient encouraged to come out of room to day room, continues to decline. Patient continues to endorse anxiety at 10, depression at 10 on scale of 1-10 with 10 being worse. Patient denies suicidal or homicidal ideation

## 2023-02-11 NOTE — PROGRESS NOTES
"INPATIENT PSYCHIATRIC PROGRESS NOTE    Name:  Estephania Lucas  :  1970  MRN:  [id number]  Visit Number:  [id number]  Length of stay:  17    Behavioral Health Treatment Plan and Problem List: I have reviewed and approved the Behavioral Health Treatment Plan and Problem list.    SUBJECTIVE    CC/ Focus of exam: depression    Patient's subjective status: \"About the same\"    INTERVAL HISTORY:   First time seeing patient.  Chart, notes, vitals, labs and EKG personally reviewed.    Patient reports treatment is \"not going as we hoped.\"  Restricted affect, reported low mood, hopelessness, anxiety, insomnia, isolating.  Reports intermittent dizziness related to low blood pressure, which is why she is refusing lisinopril.    Depression rating 10/10  Anxiety rating 6/10  Sleep: Poor     ROS: No constitutional, cardiovascular, respiratory, musculoskeletal, GI, or complaints.   Neuro: + Intermittent dizziness    OBJECTIVE    Vitals:    23 0955   BP: 104/66   Pulse: 83   Resp:    Temp:    SpO2:       Temp:  [97.8 °F (36.6 °C)-98.1 °F (36.7 °C)] 97.8 °F (36.6 °C)  Heart Rate:  [] 83  Resp:  [18] 18  BP: ()/(61-78) 104/66    MENTAL STATUS EXAM:         Psychomotor: No psychomotor agitation/retardation, No EPS, No motor tics  Speech-normal rate, amount.  Mood/Affect:  Restricted  Thought Processes:  coherent  Thought Content:  negativistic  Hallucination(s): none  Hopelessness: No  Optimistic:No  Suicidal Thoughts:   No  Suicidal Plan/Intent:  No  Homicidal Thoughts:  absent  Orientation: oriented x 3  Memory: recent intact    Lab Results (last 24 hours)     ** No results found for the last 24 hours. **           Imaging Results (Last 24 Hours)     ** No results found for the last 24 hours. **           Lab and x-ray studies reviewed.    ECG/EMG Results (most recent)     Procedure Component Value Units Date/Time    ECG 12 Lead Other [[id number]] Collected: 23     Updated: 23     QT " Interval 376 ms      QTC Interval 414 ms     Narrative:      Test Reason : Baseline Cardiac Status~  Blood Pressure :   */*   mmHG  Vent. Rate :  73 BPM     Atrial Rate :  73 BPM     P-R Int : 152 ms          QRS Dur :  78 ms      QT Int : 376 ms       P-R-T Axes :  80  68  50 degrees     QTc Int : 414 ms    Normal sinus rhythm  Possible Left atrial enlargement  Anterior infarct , age undetermined  Abnormal ECG  When compared with ECG of 27-DEC-2019 10:47,  No significant change was found  Confirmed by Sammy Chavarria ([id number]) on 1/29/2023 6:34:34 PM    Referred By: BRANDON ALDANA           Confirmed By: Sammy Chavarria    ECG 12 Lead [[id number]] Collected: 01/26/23 1659     Updated: 01/29/23 1846     QT Interval 372 ms      QTC Interval 407 ms     Narrative:      Test Reason : pre ect workup  Blood Pressure :   */*   mmHG  Vent. Rate :  72 BPM     Atrial Rate :  72 BPM     P-R Int : 144 ms          QRS Dur :  94 ms      QT Int : 372 ms       P-R-T Axes :  81  78  86 degrees     QTc Int : 407 ms    Normal sinus rhythm  Normal ECG  When compared with ECG of 25-JAN-2023 21:44, (Unconfirmed)  No significant change was found  Confirmed by Sammy Chavarria ([id number]) on 1/29/2023 6:37:28 PM    Referred By: KATYA           Confirmed By: Sammy Chavarria           ALLERGIES: Patient has no known allergies.    Medications:     aspirin, 81 mg, Oral, Daily  clopidogrel, 75 mg, Oral, Daily  [START ON 2/12/2023] lisinopril, 5 mg, Oral, Daily  megestrol, 400 mg, Oral, Daily  mirtazapine, 45 mg, Oral, Nightly  multivitamin with minerals, 1 tablet, Oral, Daily  nicotine, 1 patch, Transdermal, Q24H  QUEtiapine, 100 mg, Oral, Nightly       ASSESSMENT & PLAN    Major depressive disorder, recurrent (HCC)  - Tapered off the venlafaxine and paroxetine   -2/9/2023: Increased Mirtazapine to 45 mg at bedtime  -2/2/23: Increased quetiapine to 100 mg qHS  -Proceeded with ECT, patient agreeable.     -To provide for a supportive individual and group  psychotherapeutic effort.     Coronary artery disease s/p PCI/stenting in past  - Hospitalist consult 01/26/23 recommended continuing with DAPT - aspirin and Plavix  - EKG without acute changes  - Patient w/o chest pain or anginal equivalent   - Hospitalist consult did advise that patient at acceptable risk for ECT     Hypertension  -Discontinue lisinopril due to continued low BP and dizziness     Substance abuse (HCC)  -   Does not seem to be a current issue      Special precautions: Special Precautions Level 3 (q15 min checks)     Behavioral Health Treatment Plan and Problem List: I have reviewed and approved the Behavioral Health Treatment Plan and Problem list.      Clinician:  Andry Barrientos MD  02/11/23  14:29 EST    Dictated utilizing Dragon dictation

## 2023-02-12 PROCEDURE — 99232 SBSQ HOSP IP/OBS MODERATE 35: CPT | Performed by: PSYCHIATRY & NEUROLOGY

## 2023-02-12 RX ADMIN — MIRTAZAPINE 45 MG: 15 TABLET, FILM COATED ORAL at 21:40

## 2023-02-12 RX ADMIN — Medication 1 TABLET: at 08:38

## 2023-02-12 RX ADMIN — QUETIAPINE FUMARATE 100 MG: 100 TABLET ORAL at 21:40

## 2023-02-12 RX ADMIN — MEGESTROL ACETATE 400 MG: 40 SUSPENSION ORAL at 08:38

## 2023-02-12 RX ADMIN — ASPIRIN 81 MG: 81 TABLET, COATED ORAL at 08:38

## 2023-02-12 RX ADMIN — CLOPIDOGREL BISULFATE 75 MG: 75 TABLET, FILM COATED ORAL at 08:38

## 2023-02-12 NOTE — NURSING NOTE
"Patient once again [primarily sitting in bed. Patient  encouraged to shower, get dressed, initially declining ,  states she looks like a \" freak\" , educated , given support , encouragement . Patient further states she did shower, noted in room , dressed, will continue to monitor   "

## 2023-02-12 NOTE — PLAN OF CARE
Problem: Decreased Participation/Engagement (Depressive Signs/Symptoms)  Goal: Increased Participation and Engagement (Depressive Signs/Symptoms)  Outcome: Ongoing, Not Progressing   Goal Outcome Evaluation:

## 2023-02-12 NOTE — PROGRESS NOTES
"INPATIENT PSYCHIATRIC PROGRESS NOTE    Name:  Estephania Lucas  :  1970  MRN:  [id number]  Visit Number:  [id number]  Length of stay:  18    Behavioral Health Treatment Plan and Problem List: I have reviewed and approved the Behavioral Health Treatment Plan and Problem list.    SUBJECTIVE    CC/ Focus of exam: depression    Patient's subjective status: \"The same\"    INTERVAL HISTORY:   Patient reports continued depression, sense of hopelessness, low mood, decreased psychomotor activity.  She reports that she does not want to resume ECT tomorrow morning, saying that the last 5 treatments have not significantly improved her depressive symptoms and she does not want to go through the process again.    Depression rating 10/10  Anxiety rating 6/10  Sleep: Poor     ROS: No constitutional, cardiovascular, respiratory, musculoskeletal, GI, neurological complaints.       OBJECTIVE    Vitals:    23 0804   BP: 113/73   Pulse: 90   Resp:    Temp:    SpO2:       Temp:  [97.9 °F (36.6 °C)] 97.9 °F (36.6 °C)  Heart Rate:  [77-90] 90  Resp:  [18] 18  BP: ()/(60-73) 113/73    MENTAL STATUS EXAM:         Psychomotor: No psychomotor agitation/retardation, No EPS, No motor tics  Speech-normal rate, amount.  Mood/Affect:  Restricted  Thought Processes:  coherent  Thought Content:  negativistic  Hallucination(s): none  Hopelessness: Yes  Optimistic:No  Suicidal Thoughts:   No  Suicidal Plan/Intent:  No  Homicidal Thoughts:  absent  Orientation: oriented x 3  Memory: recent intact    Lab Results (last 24 hours)     ** No results found for the last 24 hours. **           Imaging Results (Last 24 Hours)     ** No results found for the last 24 hours. **           Lab and x-ray studies reviewed.    ECG/EMG Results (most recent)     Procedure Component Value Units Date/Time    ECG 12 Lead Other [[id number]] Collected: 23     Updated: 23 183     QT Interval 376 ms      QTC Interval 414 ms     Narrative:      " Test Reason : Baseline Cardiac Status~  Blood Pressure :   */*   mmHG  Vent. Rate :  73 BPM     Atrial Rate :  73 BPM     P-R Int : 152 ms          QRS Dur :  78 ms      QT Int : 376 ms       P-R-T Axes :  80  68  50 degrees     QTc Int : 414 ms    Normal sinus rhythm  Possible Left atrial enlargement  Anterior infarct , age undetermined  Abnormal ECG  When compared with ECG of 27-DEC-2019 10:47,  No significant change was found  Confirmed by Sammy Chavarria ([id number]) on 1/29/2023 6:34:34 PM    Referred By: BRANDON ALDANA           Confirmed By: Sammy Chavarria    ECG 12 Lead [[id number]] Collected: 01/26/23 1659     Updated: 01/29/23 1846     QT Interval 372 ms      QTC Interval 407 ms     Narrative:      Test Reason : pre ect workup  Blood Pressure :   */*   mmHG  Vent. Rate :  72 BPM     Atrial Rate :  72 BPM     P-R Int : 144 ms          QRS Dur :  94 ms      QT Int : 372 ms       P-R-T Axes :  81  78  86 degrees     QTc Int : 407 ms    Normal sinus rhythm  Normal ECG  When compared with ECG of 25-JAN-2023 21:44, (Unconfirmed)  No significant change was found  Confirmed by Sammy Chavarria ([id number]) on 1/29/2023 6:37:28 PM    Referred By: KATYA           Confirmed By: Sammy Chavarria           ALLERGIES: Patient has no known allergies.    Medications:     aspirin, 81 mg, Oral, Daily  clopidogrel, 75 mg, Oral, Daily  megestrol, 400 mg, Oral, Daily  mirtazapine, 45 mg, Oral, Nightly  multivitamin with minerals, 1 tablet, Oral, Daily  nicotine, 1 patch, Transdermal, Q24H  QUEtiapine, 100 mg, Oral, Nightly       ASSESSMENT & PLAN    Major depressive disorder, recurrent (HCC)  - Tapered off the venlafaxine and paroxetine   -2/9/2023: Increased Mirtazapine to 45 mg at bedtime  -2/2/23: Increased quetiapine 100 mg hs  -Patient no longer wishes to proceed with ECT     - To provide for a supportive individual and group psychotherapeutic effort.     Coronary artery disease s/p PCI/stenting in past  - Hospitalist consult 01/26/23  recommended continuing with DAPT - aspirin and Plavix  - EKG without acute changes  - Patient w/o chest pain or anginal equivalent   - Hospitalist consult did advise that patient at acceptable risk for ECT     Hypertension  -Discontinue lisinopril due to continued low BP and dizziness     Substance abuse (HCC)  -   Does not seem to be a current issue      Special precautions: Special Precautions Level 3 (q15 min checks)     Behavioral Health Treatment Plan and Problem List: I have reviewed and approved the Behavioral Health Treatment Plan and Problem list.      Clinician:  Andry Barrientos MD  02/12/23  13:42 EST    Dictated utilizing Dragon dictation

## 2023-02-12 NOTE — PLAN OF CARE
Goal Outcome Evaluation:  Plan of Care Reviewed With: patient  Patient Agreement with Plan of Care: agrees        Pt states anxiety 10, depression 10. She states she has trouble falling and staying asleep, though she appears asleep during nursing rounds at night. Pt does state she ate a little better today. Pt is calm and cooperative with staff, med compliant.

## 2023-02-13 ENCOUNTER — ANESTHESIA (OUTPATIENT)
Dept: PSYCHIATRY | Facility: HOSPITAL | Age: 53
DRG: 885 | End: 2023-02-13
Payer: MEDICARE

## 2023-02-13 ENCOUNTER — ANESTHESIA EVENT (OUTPATIENT)
Dept: PSYCHIATRY | Facility: HOSPITAL | Age: 53
DRG: 885 | End: 2023-02-13
Payer: MEDICARE

## 2023-02-13 PROCEDURE — 90870 ELECTROCONVULSIVE THERAPY: CPT | Performed by: PSYCHIATRY & NEUROLOGY

## 2023-02-13 PROCEDURE — 99232 SBSQ HOSP IP/OBS MODERATE 35: CPT | Performed by: PSYCHIATRY & NEUROLOGY

## 2023-02-13 RX ORDER — MIDAZOLAM HYDROCHLORIDE 1 MG/ML
1 INJECTION INTRAMUSCULAR; INTRAVENOUS
Status: CANCELLED | OUTPATIENT
Start: 2023-02-13

## 2023-02-13 RX ORDER — SODIUM CHLORIDE, SODIUM LACTATE, POTASSIUM CHLORIDE, CALCIUM CHLORIDE 600; 310; 30; 20 MG/100ML; MG/100ML; MG/100ML; MG/100ML
125 INJECTION, SOLUTION INTRAVENOUS ONCE
Status: CANCELLED | OUTPATIENT
Start: 2023-02-13 | End: 2023-02-13

## 2023-02-13 RX ADMIN — Medication 1 TABLET: at 08:58

## 2023-02-13 RX ADMIN — ASPIRIN 81 MG: 81 TABLET, COATED ORAL at 08:58

## 2023-02-13 RX ADMIN — QUETIAPINE FUMARATE 100 MG: 100 TABLET ORAL at 21:52

## 2023-02-13 RX ADMIN — CLOPIDOGREL BISULFATE 75 MG: 75 TABLET, FILM COATED ORAL at 08:58

## 2023-02-13 RX ADMIN — MEGESTROL ACETATE 400 MG: 40 SUSPENSION ORAL at 08:58

## 2023-02-13 RX ADMIN — MIRTAZAPINE 45 MG: 15 TABLET, FILM COATED ORAL at 21:51

## 2023-02-13 NOTE — PLAN OF CARE
"Goal Outcome Evaluation:   Consent Given to Review Plan with: brother Verduzco  Progress: improving  Outcome Evaluation: Therapist met with patient to review plan of care; patient agreeable.       DATA:     Therapist discussed case with RN and met with patient today to review coping skills, review plan of care, and discuss discharge.    Therapist contacted patient's brother, Jono. Jono states that when he visited the patient last week, she did seem more \"lively\" than previously. Jono states he offered to allow the patient to stay with him at discharge and she was not interested. Jono states she would not have a room or bed at his home, but could possibly sleep on the couch if she wanted. Jono states he believes the patient plans to go home with her boyfriend, Junaid.      Clinical Maneuvering/Intervention:     Therapist assisted patient in processing above session content; acknowledged and normalized patient’s thoughts, feelings, and concerns.  Discussed the therapist/patient relationship and explain the parameters and limitations of relative confidentiality.  Also discussed the importance of active participation, and honesty to the treatment process.  Encouraged the patient to discuss/vent their feelings, frustrations, and fears concerning their ongoing medical issues and validated their feelings.     Allowed patient to freely discuss issues without interruption or judgment. Provided safe, confidential environment to facilitate the development of positive therapeutic relationship and encourage open, honest communication.      Therapist addressed discharge safety planning this date. Assisted patient in identifying risk factors which would indicate the need for higher level of care after discharge;  including thoughts to harm self or others and/or self-harming behavior. Encouraged patient to call 911, or present to the nearest emergency room should any of these events occur. Discussed crisis intervention " "services and means to access.  Encouraged securing any objects of harm.    Therapist completed integrated summary, treatment plan, and initiated social history this date.  Therapist is strongly encouraging family involvement in treatment.       Encouraged mask wearing, social distancing, and regular hand washing due to COVID19 risk.      ASSESSMENT:      Therapist met 1:1 with patient today. Patient's affect is less restricted and she is responding more spontaneously in conversation. Patient initially reports she plans to stay with her brother at discharge and states she discussed it with him. Therapist advised the patient that I talked with her brother this morning and he stated he thought the patient was planning to return to Junaid's house and that he didn't think the patient was interested in staying with him. Therapist advised the patient that her brother states she would likely be sleeping on the couch at his house. Patient became irritable, stating \"it's always something\" and that she would have to consider where she would be going at discharge.     It is unclear what her motives are for being evasive/deceptive; however, this has been a pattern throughout her stay.      PLAN:       Patient to remain hospitalized this date.      Treatment team will focus efforts on stabilizing patient's acute symptoms while providing education on healthy coping and crisis management to reduce hospitalizations.   Patient requires daily psychiatrist evaluation and 24/7 nursing supervision to promote patient  safety.     Therapist will offer 1-4 individual sessions, 1 therapy group daily, family education, and appropriate referral.    "

## 2023-02-13 NOTE — PLAN OF CARE
Goal Outcome Evaluation:  Plan of Care Reviewed With: patient  Patient Agreement with Plan of Care: agrees        Outcome Evaluation: Patient reports anxiety and depression at 10.  Denies SI/HI or AVH.  Patient calm and cooperative this shift.

## 2023-02-13 NOTE — ANESTHESIA PREPROCEDURE EVALUATION
Anesthesia Evaluation     Patient summary reviewed and Nursing notes reviewed   history of anesthetic complications: PONV  NPO Solid Status: > 8 hours  NPO Liquid Status: > 8 hours           Airway   Mallampati: II  TM distance: >3 FB  Neck ROM: full  Dental - normal exam   (+) poor dentition        Pulmonary - normal exam    breath sounds clear to auscultation  (+) a smoker Former, sleep apnea,   Cardiovascular - normal exam  Exercise tolerance: good (4-7 METS)    ECG reviewed  PT is on anticoagulation therapy  Rhythm: regular  Rate: normal    (+) hypertension, past MI , CAD,       Neuro/Psych  (+) psychiatric history,    GI/Hepatic/Renal/Endo    (+)  GERD,      Musculoskeletal (-) negative ROS    Abdominal  - normal exam   Substance History - negative use      Comment: History of Substance abuse   OB/GYN negative ob/gyn ROS         Other - negative ROS                         Anesthesia Plan    ASA 3     general   total IV anesthesia  intravenous induction     Anesthetic plan, risks, benefits, and alternatives have been provided, discussed and informed consent has been obtained with: patient.  Pre-procedure education provided  Plan discussed with CRNA.        CODE STATUS:         Lab Results   Component Value Date    WBC 8.43 01/26/2023    HGB 12.7 01/26/2023    HCT 38.0 01/26/2023    MCV 93.6 01/26/2023     01/26/2023       Lab Results   Component Value Date    GLUCOSE 134 (H) 01/27/2023    BUN 17 01/27/2023    CREATININE 0.68 01/27/2023    EGFRIFNONA 77 12/27/2019    BCR 25.0 01/27/2023    K 3.7 01/27/2023    CO2 23.9 01/27/2023    CALCIUM 8.7 01/27/2023    ALBUMIN 3.5 01/27/2023    AST 12 01/27/2023    ALT 10 01/27/2023

## 2023-02-13 NOTE — PLAN OF CARE
Goal Outcome Evaluation:  Plan of Care Reviewed With: patient  Patient Agreement with Plan of Care: agrees  Consent Given to Review Plan with: Jono Espinosa Brother  Progress: no change  Outcome Evaluation: PT reports not sleeping well. Reports anxiety and depression 10/10. Denies AVH. Refused ECT. Denies SI, HI. PT isolates in room. No complaints this shift. PT calm and cooperative.

## 2023-02-13 NOTE — PROGRESS NOTES
"INPATIENT PSYCHIATRIC PROGRESS NOTE    Name:  Estephania Lucas  :  1970  MRN:  [id number]  Visit Number:  [id number]  Length of stay:  19    Behavioral Health Treatment Plan and Problem List: I have reviewed and approved the Behavioral Health Treatment Plan and Problem list.    SUBJECTIVE    CC/ Focus of exam: depression    Patient's subjective status: \"Doing ok\"    INTERVAL HISTORY: The patient declined addition ECT noting that the 5 treatments have not proven to be beneficial. This morning her subjective status remains depressed but denies feeling hopeless, having feeling she would be better off dead or having suicidal thoughts. Her appetite has improve, she has showered and is dressed in street clothes as apposed to hospital garb. She has a sheepish smile during the interview. Slept well, does continue to prefer to isolate.   Her brother visited several days ago and according to the patient will welcome her in his home. Patient has no preference for follow up - will reschedule. Anticipate discharge tomorrow.     Depression rating 9/10 (subjective)  Anxiety rating 9/10  Sleep: 10:30 to 7:30, 8-9 hours  Craving: days not     ROS: No cardiovascular, GI, or Neurological complaints.       OBJECTIVE    Vitals:    23   BP: 113/71   Pulse: 91   Resp: 18   Temp:    SpO2:       Temp:  [97.7 °F (36.5 °C)] 97.7 °F (36.5 °C)  Heart Rate:  [91-99] 91  Resp:  [18] 18  BP: (113-123)/(71-78) 113/71    MENTAL STATUS EXAM:         Psychomotor: No psychomotor agitation/retardation, No EPS, No motor tics  Speech-normal rate, amount.  Mood/Affect:  Depressed  Thought Processes:  coherent  Thought Content:  mood congruent  Hallucination(s): none  Hopelessness: No  Optimistic:minimally  Suicidal Thoughts:   No  Suicidal Plan/Intent:  No  Homicidal Thoughts:  absent  Orientation: oriented x 3  Memory: recent intact    Lab Results (last 24 hours)     ** No results found for the last 24 hours. **           Imaging Results " (Last 24 Hours)     ** No results found for the last 24 hours. **           Lab and x-ray studies reviewed.    ECG/EMG Results (most recent)     Procedure Component Value Units Date/Time    ECG 12 Lead Other [[id number]] Collected: 01/25/23 2144     Updated: 01/29/23 1836     QT Interval 376 ms      QTC Interval 414 ms     Narrative:      Test Reason : Baseline Cardiac Status~  Blood Pressure :   */*   mmHG  Vent. Rate :  73 BPM     Atrial Rate :  73 BPM     P-R Int : 152 ms          QRS Dur :  78 ms      QT Int : 376 ms       P-R-T Axes :  80  68  50 degrees     QTc Int : 414 ms    Normal sinus rhythm  Possible Left atrial enlargement  Anterior infarct , age undetermined  Abnormal ECG  When compared with ECG of 27-DEC-2019 10:47,  No significant change was found  Confirmed by Sammy Chavarria ([id number]) on 1/29/2023 6:34:34 PM    Referred By: BRANDON ALDANA           Confirmed By: Sammy Chavarria    ECG 12 Lead [[id number]] Collected: 01/26/23 1659     Updated: 01/29/23 1846     QT Interval 372 ms      QTC Interval 407 ms     Narrative:      Test Reason : pre ect workup  Blood Pressure :   */*   mmHG  Vent. Rate :  72 BPM     Atrial Rate :  72 BPM     P-R Int : 144 ms          QRS Dur :  94 ms      QT Int : 372 ms       P-R-T Axes :  81  78  86 degrees     QTc Int : 407 ms    Normal sinus rhythm  Normal ECG  When compared with ECG of 25-JAN-2023 21:44, (Unconfirmed)  No significant change was found  Confirmed by Sammy Chavarria ([id number]) on 1/29/2023 6:37:28 PM    Referred By: KATYA           Confirmed By: Sammy Chavarria           ALLERGIES: Patient has no known allergies.    Medications:     aspirin, 81 mg, Oral, Daily  clopidogrel, 75 mg, Oral, Daily  megestrol, 400 mg, Oral, Daily  mirtazapine, 45 mg, Oral, Nightly  multivitamin with minerals, 1 tablet, Oral, Daily  nicotine, 1 patch, Transdermal, Q24H  QUEtiapine, 100 mg, Oral, Nightly       ASSESSMENT & PLAN    Major depressive disorder, recurrent (HCC)  - Tapered off  the venlafaxine and paroxetine   -2/9/2023: Increased Mirtazapine to 45 mg at bedtime  -2/2/23: Increased quetiapine 100 mg hs  -Patient no longer wishes to proceed with ECT     - To provide for a supportive individual and group psychotherapeutic effort.     Coronary artery disease s/p PCI/stenting in past  - Hospitalist consult 01/26/23 recommended continuing with DAPT - aspirin and Plavix  - EKG without acute changes  - Patient w/o chest pain or anginal equivalent   - Hospitalist consult did advise that patient at acceptable risk for ECT     Hypertension  -Discontinue lisinopril due to continued low BP and dizziness     Substance abuse (HCC)  -   Does not seem to be a current issue      Special precautions: Special Precautions Level 3 (q15 min checks)     Behavioral Health Treatment Plan and Problem List: I have reviewed and approved the Behavioral Health Treatment Plan and Problem list.    I spent a total of 26 minutes in direct patient care including  17 minutes face to face with the patient assessment, coordination of care, and counseling the patient on the current and follow-up treatment plans regarding her status and situation.  Patient had no additional questions.     MELINDA Vergara MD    Clinician:  Gautam Vergara MD  02/13/23  08:32 EST    Dictated utilizing Dragon dictation

## 2023-02-13 NOTE — PLAN OF CARE
Problem: Activity and Energy Impairment (Depressive Signs/Symptoms)  Goal: Optimized Energy Level (Depressive Signs/Symptoms)  2/13/2023 1456 by Elke Quinones, RN  Outcome: Ongoing, Progressing  2/13/2023 1454 by Elke Quinones, RN  Outcome: Ongoing, Progressing  Intervention: Optimize Energy Level  Recent Flowsheet Documentation  Taken 2/13/2023 0918 by Elke Quinones, RN  Activity (Behavioral Health): up ad juanjose  Patient Performed Hygiene: dressed   Goal Outcome Evaluation:  Plan of Care Reviewed With: patient  Patient Agreement with Plan of Care: agrees  Consent Given to Review Plan with: Jono Siu

## 2023-02-14 VITALS
DIASTOLIC BLOOD PRESSURE: 76 MMHG | WEIGHT: 105 LBS | RESPIRATION RATE: 16 BRPM | TEMPERATURE: 99.7 F | BODY MASS INDEX: 16.88 KG/M2 | SYSTOLIC BLOOD PRESSURE: 113 MMHG | OXYGEN SATURATION: 99 % | HEART RATE: 89 BPM | HEIGHT: 66 IN

## 2023-02-14 PROCEDURE — 99239 HOSP IP/OBS DSCHRG MGMT >30: CPT | Performed by: PSYCHIATRY & NEUROLOGY

## 2023-02-14 RX ORDER — MIRTAZAPINE 45 MG/1
45 TABLET, FILM COATED ORAL NIGHTLY
Qty: 30 TABLET | Refills: 0 | Status: SHIPPED | OUTPATIENT
Start: 2023-02-14

## 2023-02-14 RX ORDER — CLOPIDOGREL BISULFATE 75 MG/1
75 TABLET ORAL DAILY
Qty: 30 TABLET | Refills: 0 | Status: SHIPPED | OUTPATIENT
Start: 2023-02-14 | End: 2023-02-14 | Stop reason: SDUPTHER

## 2023-02-14 RX ORDER — ASPIRIN 81 MG/1
81 TABLET ORAL DAILY
Qty: 30 TABLET | Refills: 0 | Status: SHIPPED | OUTPATIENT
Start: 2023-02-14 | End: 2023-02-14 | Stop reason: SDUPTHER

## 2023-02-14 RX ORDER — MEGESTROL ACETATE 40 MG/ML
400 SUSPENSION ORAL DAILY
Qty: 480 ML | Refills: 0 | Status: SHIPPED | OUTPATIENT
Start: 2023-02-14

## 2023-02-14 RX ORDER — MEGESTROL ACETATE 40 MG/ML
400 SUSPENSION ORAL DAILY
Qty: 480 ML | Refills: 0 | Status: SHIPPED | OUTPATIENT
Start: 2023-02-14 | End: 2023-02-14 | Stop reason: SDUPTHER

## 2023-02-14 RX ORDER — ASPIRIN 81 MG/1
81 TABLET ORAL DAILY
Qty: 30 TABLET | Refills: 0 | Status: SHIPPED | OUTPATIENT
Start: 2023-02-14

## 2023-02-14 RX ORDER — QUETIAPINE FUMARATE 100 MG/1
100 TABLET, FILM COATED ORAL NIGHTLY
Qty: 30 TABLET | Refills: 0 | Status: SHIPPED | OUTPATIENT
Start: 2023-02-14

## 2023-02-14 RX ORDER — CLOPIDOGREL BISULFATE 75 MG/1
75 TABLET ORAL DAILY
Qty: 30 TABLET | Refills: 0 | Status: SHIPPED | OUTPATIENT
Start: 2023-02-14

## 2023-02-14 RX ORDER — QUETIAPINE FUMARATE 100 MG/1
100 TABLET, FILM COATED ORAL NIGHTLY
Qty: 30 TABLET | Refills: 0 | Status: SHIPPED | OUTPATIENT
Start: 2023-02-14 | End: 2023-02-14 | Stop reason: SDUPTHER

## 2023-02-14 RX ORDER — MIRTAZAPINE 45 MG/1
45 TABLET, FILM COATED ORAL NIGHTLY
Qty: 30 TABLET | Refills: 0 | Status: SHIPPED | OUTPATIENT
Start: 2023-02-14 | End: 2023-02-14 | Stop reason: SDUPTHER

## 2023-02-14 NOTE — DISCHARGE SUMMARY
Date of Discharge:  2/14/2023    Discharge Diagnosis:Principal Problem:    Major depressive disorder, recurrent (HCC)  Active Problems:    Coronary artery disease    Hypertension    Substance abuse (HCC)    UTI (urinary tract infection)        Presenting Problem/History of Present Illness:Patient was admitted to the hospital on January 25 having presented depressed referred here from the [facility] psychiatric unit for ECT, treatment voluntarily admitted for safety evaluation and treatment, see admission note for details.         Hospital Course:    Patient was admitted for safety and stabilization and was placed on standard precautions.  Routine labs were checked.  Patient was assigned a master's level therapist and provided with an opportunity to participate in group and individual therapy on the unit.  Patient seen on a daily basis for evaluation and supportive therapy.  Patient hospitalized in psychotropic medications  Reformatted evolving to combination of mirtazapine 45 mg at bedtime and quetiapine 100 mg at bedtime.  Patient received 5 bifrontal ECT treatments with only minimal benefit.  At discharge patient had moderate degree of residual dysthymia but was denying any sense of hopelessness or any thoughts of self-harm or harming others.  No psychotic symptoms demonstrated throughout her stay.  At discharge patient electing to return to her boyfriend's residence, she had been living with him for the past year PTA.  [facility] [place]  See below for details and medications.     consults:   Consults     Date and Time Order Name Status Description    1/27/2023  9:41 AM Inpatient Hospitalist Consult      1/26/2023 12:02 PM Inpatient Hospitalist Consult      1/26/2023  8:11 AM Inpatient Hospitalist Consult Completed           Labs:  Lab Results (all)     Procedure Component Value Units Date/Time    Urine Culture - Urine, Urine, Clean Catch  [[id number]]  (Abnormal)  (Susceptibility) Collected: 01/26/23 0954    Specimen: Urine, Clean Catch Updated: 01/28/23 0950     Urine Culture >100,000 CFU/mL Escherichia coli    Narrative:      Colonization of the urinary tract without infection is common. Treatment is discouraged unless the patient is symptomatic, pregnant, or undergoing an invasive urologic procedure.    Susceptibility      Escherichia coli      TREY      Ampicillin Susceptible      Ampicillin + Sulbactam Susceptible      Cefazolin Susceptible      Cefepime Susceptible      Ceftazidime Susceptible      Ceftriaxone Susceptible      Gentamicin Susceptible      Levofloxacin Susceptible      Nitrofurantoin Susceptible      Piperacillin + Tazobactam Susceptible      Trimethoprim + Sulfamethoxazole Resistant                          Comprehensive Metabolic Panel [[id number]]  (Abnormal) Collected: 01/27/23 1036    Specimen: Blood Updated: 01/27/23 1115     Glucose 134 mg/dL      BUN 17 mg/dL      Creatinine 0.68 mg/dL      Sodium 138 mmol/L      Potassium 3.7 mmol/L      Chloride 106 mmol/L      CO2 23.9 mmol/L      Calcium 8.7 mg/dL      Total Protein 5.9 g/dL      Albumin 3.5 g/dL      ALT (SGPT) 10 U/L      AST (SGOT) 12 U/L      Alkaline Phosphatase 78 U/L      Total Bilirubin 0.2 mg/dL      Globulin 2.4 gm/dL      A/G Ratio 1.5 g/dL      BUN/Creatinine Ratio 25.0     Anion Gap 8.1 mmol/L      eGFR 105.6 mL/min/1.73     Narrative:      GFR Normal >60  Chronic Kidney Disease <60  Kidney Failure <15      TSH [[id number]]  (Normal) Collected: 01/26/23 1216    Specimen: Blood Updated: 01/26/23 1243     TSH 1.550 uIU/mL     T4, Free [[id number]]  (Normal) Collected: 01/26/23 1216    Specimen: Blood Updated: 01/26/23 1243     Free T4 0.93 ng/dL     Narrative:      Results may be falsely increased if patient taking Biotin.      Comprehensive Metabolic Panel [[id number]]  (Abnormal) Collected: 01/26/23 0957    Specimen: Blood Updated: 01/26/23 1047     Glucose 92  mg/dL      BUN 18 mg/dL      Creatinine 0.66 mg/dL      Sodium 139 mmol/L      Potassium 3.8 mmol/L      Chloride 105 mmol/L      CO2 25.6 mmol/L      Calcium 9.1 mg/dL      Total Protein 5.9 g/dL      Albumin 3.4 g/dL      ALT (SGPT) 6 U/L      AST (SGOT) 13 U/L      Alkaline Phosphatase 76 U/L      Total Bilirubin 0.2 mg/dL      Globulin 2.5 gm/dL      A/G Ratio 1.4 g/dL      BUN/Creatinine Ratio 27.3     Anion Gap 8.4 mmol/L      eGFR 106.4 mL/min/1.73     Narrative:      GFR Normal >60  Chronic Kidney Disease <60  Kidney Failure <15      Magnesium [[id number]]  (Normal) Collected: 01/26/23 0957    Specimen: Blood Updated: 01/26/23 1047     Magnesium 1.8 mg/dL     CBC & Differential [[id number]]  (Abnormal) Collected: 01/26/23 0957    Specimen: Blood Updated: 01/26/23 1028    Narrative:      The following orders were created for panel order CBC & Differential.  Procedure                               Abnormality         Status                     ---------                               -----------         ------                     CBC Auto Differential[[id number]]        Abnormal            Final result                 Please view results for these tests on the individual orders.    CBC Auto Differential [[id number]]  (Abnormal) Collected: 01/26/23 0957    Specimen: Blood Updated: 01/26/23 1028     WBC 8.43 10*3/mm3      RBC 4.06 10*6/mm3      Hemoglobin 12.7 g/dL      Hematocrit 38.0 %      MCV 93.6 fL      MCH 31.3 pg      MCHC 33.4 g/dL      RDW 12.5 %      RDW-SD 43.1 fl      MPV 9.3 fL      Platelets 239 10*3/mm3      Neutrophil % 76.7 %      Lymphocyte % 17.9 %      Monocyte % 4.4 %      Eosinophil % 0.2 %      Basophil % 0.4 %      Immature Grans % 0.4 %      Neutrophils, Absolute 6.47 10*3/mm3      Lymphocytes, Absolute 1.51 10*3/mm3      Monocytes, Absolute 0.37 10*3/mm3      Eosinophils, Absolute 0.02 10*3/mm3      Basophils, Absolute 0.03 10*3/mm3      Immature Grans, Absolute 0.03 10*3/mm3      nRBC 0.0  /100 WBC     Urine Drug Screen - Urine, Clean Catch [[id number]]  (Normal) Collected: 01/26/23 0954    Specimen: Urine, Clean Catch Updated: 01/26/23 1011     THC, Screen, Urine Negative     Phencyclidine (PCP), Urine Negative     Cocaine Screen, Urine Negative     Methamphetamine, Ur Negative     Opiate Screen Negative     Amphetamine Screen, Urine Negative     Benzodiazepine Screen, Urine Negative     Tricyclic Antidepressants Screen Negative     Methadone Screen, Urine Negative     Barbiturates Screen, Urine Negative     Oxycodone Screen, Urine Negative     Propoxyphene Screen Negative     Buprenorphine, Screen, Urine Negative    Narrative:      Cutoff For Drugs Screened:    Amphetamines               500 ng/ml  Barbiturates               200 ng/ml  Benzodiazepines            150 ng/ml  Cocaine                    150 ng/ml  Methadone                  200 ng/ml  Opiates                    100 ng/ml  Phencyclidine               25 ng/ml  THC                            50 ng/ml  Methamphetamine            500 ng/ml  Tricyclic Antidepressants  300 ng/ml  Oxycodone                  100 ng/ml  Propoxyphene               300 ng/ml  Buprenorphine               10 ng/ml    The normal value for all drugs tested is negative. This report includes unconfirmed screening results, with the cutoff values listed, to be used for medical treatment purposes only.  Unconfirmed results must not be used for non-medical purposes such as employment or legal testing.  Clinical consideration should be applied to any drug of abuse test, particularly when unconfirmed results are used.      Urinalysis With Microscopic If Indicated (No Culture) - Urine, Clean Catch [[id number]]  (Abnormal) Collected: 01/26/23 0954    Specimen: Urine, Clean Catch Updated: 01/26/23 1006     Color, UA Yellow     Appearance, UA Clear     pH, UA 7.5     Specific Gravity, UA >1.030     Glucose, UA Negative     Ketones, UA Negative     Bilirubin, UA Negative     Blood,  UA Small (1+)     Protein, UA Negative     Leuk Esterase, UA Small (1+)     Nitrite, UA Negative     Urobilinogen, UA 1.0 E.U./dL    Urinalysis, Microscopic Only - Urine, Clean Catch [[id number]]  (Abnormal) Collected: 01/26/23 0954    Specimen: Urine, Clean Catch Updated: 01/26/23 1006     RBC, UA 3-5 /HPF      WBC, UA Too Numerous to Count /HPF      Bacteria, UA 3+ /HPF      Squamous Epithelial Cells, UA 0-2 /HPF      Hyaline Casts, UA 3-6 /LPF      Methodology Automated Microscopy    Pregnancy, Urine - Urine, Clean Catch [[id number]]  (Normal) Collected: 01/26/23 0954    Specimen: Urine, Clean Catch Updated: 01/26/23 1005     HCG, Urine QL Negative    Narrative:      Diluted specimens may cause false negative results.    Comprehensive Metabolic Panel [[id number]]  (Abnormal) Collected: 01/25/23 2043    Specimen: Blood Updated: 01/25/23 2111     Glucose 96 mg/dL      BUN 24 mg/dL      Creatinine 0.77 mg/dL      Sodium 138 mmol/L      Potassium 3.9 mmol/L      Chloride 105 mmol/L      CO2 24.7 mmol/L      Calcium 9.0 mg/dL      Total Protein 6.1 g/dL      Albumin 3.5 g/dL      ALT (SGPT) 8 U/L      AST (SGOT) 10 U/L      Alkaline Phosphatase 69 U/L      Total Bilirubin <0.2 mg/dL      Globulin 2.6 gm/dL      A/G Ratio 1.3 g/dL      BUN/Creatinine Ratio 31.2     Anion Gap 8.3 mmol/L      eGFR 93.5 mL/min/1.73     Narrative:      GFR Normal >60  Chronic Kidney Disease <60  Kidney Failure <15      CBC & Differential [[id number]]  (Abnormal) Collected: 01/25/23 2043    Specimen: Blood Updated: 01/25/23 2051    Narrative:      The following orders were created for panel order CBC & Differential.  Procedure                               Abnormality         Status                     ---------                               -----------         ------                     CBC Auto Differential[[id number]]        Abnormal            Final result                 Please view results for these tests on the individual orders.    CBC  Auto Differential [[id number]]  (Abnormal) Collected: 01/25/23 2043    Specimen: Blood Updated: 01/25/23 2051     WBC 9.36 10*3/mm3      RBC 3.75 10*6/mm3      Hemoglobin 11.9 g/dL      Hematocrit 35.6 %      MCV 94.9 fL      MCH 31.7 pg      MCHC 33.4 g/dL      RDW 12.6 %      RDW-SD 43.8 fl      MPV 9.1 fL      Platelets 232 10*3/mm3      Neutrophil % 75.4 %      Lymphocyte % 18.1 %      Monocyte % 5.4 %      Eosinophil % 0.6 %      Basophil % 0.3 %      Immature Grans % 0.2 %      Neutrophils, Absolute 7.05 10*3/mm3      Lymphocytes, Absolute 1.69 10*3/mm3      Monocytes, Absolute 0.51 10*3/mm3      Eosinophils, Absolute 0.06 10*3/mm3      Basophils, Absolute 0.03 10*3/mm3      Immature Grans, Absolute 0.02 10*3/mm3      nRBC 0.0 /100 WBC           Imaging:  Imaging Results (All)     Procedure Component Value Units Date/Time    XR Spine Lumbar Lateral [[id number]] Collected: 01/26/23 1345     Updated: 01/26/23 1349    Narrative:      EXAM:    XR Lumbosacral Spine, 2 or 3 Views     EXAM DATE:    1/26/2023 1:29 PM     CLINICAL HISTORY:    ELECTROCONVULSIVE THERAPY     TECHNIQUE:    Frontal and lateral views of the lumbar spine and sacrum.     COMPARISON:    No relevant prior studies available.     FINDINGS:    VERTEBRAE:  Unremarkable.  No acute fracture.  Normal alignment.    SACRUM/COCCYX:  Unremarkable as visualized.  No acute fracture.    DISC SPACES:  No acute findings.  No significant narrowing.    SOFT TISSUES:  Unremarkable.       Impression:        No acute findings in the lumbar spine.     This report was finalized on 1/26/2023 1:45 PM by Dr. Gurpreet Aguilar MD.       CT Head With & Without Contrast [[id number]] Collected: 01/26/23 1034     Updated: 01/26/23 1037    Narrative:      CT HEAD W WO CONTRAST-     CLINICAL INDICATION: workup for ECT; F33.2-Major depressive disorder,  recurrent severe without psychotic features        COMPARISON: None available      TECHNIQUE: Axial images of the brain were obtained  with out intravenous  contrast.  Reformatted images were created in the sagittal and coronal  planes. Images were then acquired after IV contrast     DOSE:     Radiation dose reduction techniques were utilized per ALARA protocol.  Automated exposure control was initiated through either or [employer] or  [employer] software packages by  protocol.        FINDINGS:    BRAIN:  Unremarkable.  No hemorrhage.  No significant white matter  disease.  No edema.       VENTRICLES:  Unremarkable.  No ventriculomegaly.       BONES/JOINTS:  Unremarkable.  No acute fracture.       SOFT TISSUES:  Unremarkable.       SINUSES:  no air fluid levels       MASTOID AIR CELLS:  Unremarkable as visualized.  No mastoid effusion.          Impression:          1. No parenchymal mass, hemorrhage, or midline shift  2. No contrast-enhancing abnormalities     This report was finalized on 1/26/2023 10:35 AM by Dr. Gurpreet Aguilar MD.       XR Chest 1 View [[id number]] Collected: 01/26/23 1032     Updated: 01/26/23 1034    Narrative:      XR CHEST 1 VW-     CLINICAL INDICATION: workup for ECT; F33.2-Major depressive disorder,  recurrent severe without psychotic features        COMPARISON: 12/27/2019      TECHNIQUE: Single frontal view of the chest.     FINDINGS:      LUNGS: Lungs are adequately aerated.      HEART AND MEDIASTINUM: Heart and mediastinal contours are unremarkable        SKELETON: Bony and soft tissue structures are unremarkable.             Impression:      No radiographic evidence of acute cardiac or pulmonary disease.     This report was finalized on 1/26/2023 10:32 AM by Dr. Gurpreet Aguilar MD.             Condition on Discharge:  improved    Prognosis: Guarded    Vital Signs  Temp:  [98.7 °F (37.1 °C)-99.7 °F (37.6 °C)] 99.7 °F (37.6 °C)  Heart Rate:  [80-89] 89  Resp:  [16-20] 16  BP: (103-117)/(70-78) 113/76    Discharge Disposition      Discharge Medications     Discharge Medications      ASK your doctor about these  medications      Instructions Start Date   aspirin 81 MG EC tablet   81 mg, Oral, Daily      busPIRone 10 MG tablet  Commonly known as: BUSPAR   20 mg, Oral, 2 Times Daily      clopidogrel 75 MG tablet  Commonly known as: PLAVIX   75 mg, Oral, Daily      diphenhydrAMINE 25 mg capsule  Commonly known as: BENADRYL   25 mg, Oral, Every 6 Hours PRN      lisinopril 10 MG tablet  Commonly known as: PRINIVIL,ZESTRIL  Ask about: Which instructions should I use?   10 mg, Oral, Daily      megestrol 40 MG/ML suspension  Commonly known as: MEGACE   400 mg, Oral, Daily      mirtazapine 15 MG tablet  Commonly known as: REMERON   15 mg, Oral, Nightly      PARoxetine 30 MG tablet  Commonly known as: PAXIL   60 mg, Oral, Every Morning      QUEtiapine 50 MG tablet  Commonly known as: SEROquel   50 mg, Oral, Nightly PRN      QUEtiapine 200 MG tablet  Commonly known as: SEROquel   200 mg, Oral, 2 Times Daily      venlafaxine  MG 24 hr capsule  Commonly known as: EFFEXOR-XR   150 mg, Oral, Daily, Takes with 75mg      venlafaxine XR 75 MG 24 hr capsule  Commonly known as: EFFEXOR-XR   75 mg, Oral, Daily, Takes with 150mg             Discharge Diet: Regular    Activity at Discharge: no restrictions    Follow-up Appointments:       [address] [address] [phone]     February 20 2023 at 8:30am.            Gautam Vergara MD  02/14/23  11:53 EST  Time spent with the discharge process >30 minutes.     Dictated utilizing Dragon dictation

## 2023-02-14 NOTE — DISCHARGE INSTR - APPOINTMENTS
Richie  [address] [place] [address], [place] [address]  [phone]    February 20 2023 at 8:30am.

## 2023-02-14 NOTE — PROGRESS NOTES
Patient has been scheduled the following appointment:     [address] [address]  [phone]    February 20 2023 at 8:30am.

## 2023-02-14 NOTE — PLAN OF CARE
Goal Outcome Evaluation:  Plan of Care Reviewed With: patient        Progress: no change  Outcome Evaluation: Pt rated anxiety and depression a 10/10. PT stated she was still not sleeping and had no appetite. PT stated she had no si/hi or avh. Pt stayed in her vin for the entire shift and had no issues or concerns.

## 2023-02-14 NOTE — PLAN OF CARE
Goal Outcome Evaluation:   Consent Given to Review Plan with: brother Verduzco  Progress: improving  Outcome Evaluation: Therapist met with patient to review plan of care; patient agreeable.       DATA:      Therapist discussed case with Dr. Vergara and met with patient today to review coping skills, review plan of care, and discuss discharge.    Therapist obtained consent for Gray Peralta. Therapist contacted Gray to complete safety planning. Gray is agreeable with the patient coming home today and denies any concerns. Therapist advised that the patient should not have access to firearms/weapons, medications, or sharp objects (knives/scissors). Gray is agreeable. He states there are no firearms in the home and he will secure other potentially harmful objects.     Therapist recommends outpatient medication management and therapy. Patient is agreeable and consents to [facility].      Clinical Maneuvering/Intervention:     Therapist assisted patient in processing above session content; acknowledged and normalized patient’s thoughts, feelings, and concerns.  Discussed the therapist/patient relationship and explain the parameters and limitations of relative confidentiality.  Also discussed the importance of active participation, and honesty to the treatment process.  Encouraged the patient to discuss/vent their feelings, frustrations, and fears concerning their ongoing medical issues and validated their feelings.     Allowed patient to freely discuss issues without interruption or judgment. Provided safe, confidential environment to facilitate the development of positive therapeutic relationship and encourage open, honest communication.      Therapist addressed discharge safety planning this date. Assisted patient in identifying risk factors which would indicate the need for higher level of care after discharge;  including thoughts to harm self or others and/or self-harming behavior. Encouraged patient to  Spoke with Patient. Patient is doing well. Has no concerns at this time. Incision looks good per patient. Per Dr. Lori Doyle, patient does not need to be seen today in office. Patient is agreeable to this.    call 911, or present to the nearest emergency room should any of these events occur. Discussed crisis intervention services and means to access.  Encouraged securing any objects of harm.    Therapist completed integrated summary, treatment plan, and initiated social history this date.  Therapist is strongly encouraging family involvement in treatment.       Encouraged mask wearing, social distancing, and regular hand washing due to COVID19 risk.      ASSESSMENT:      Therapist met 1:1 with patient today. Patient denies suicidal ideation. Patient denies homicidal ideation. Patient denies AVH. Patient is cooperative with assessment. She reports feeling ready to go home today. Patient is future oriented and identifies protective factors. She is agreeable to return to the nearest ED/contact 911 if she experiences SI/HI/AVH.      PLAN:       Patient anticipating discharge today.

## 2023-02-14 NOTE — PROGRESS NOTES
"INPATIENT PSYCHIATRIC PROGRESS NOTE    Name:  Estephania Lucas  :  1970  MRN:  [id number]  Visit Number:  [id number]  Length of stay:  20    Behavioral Health Treatment Plan and Problem List: I have reviewed and approved the Behavioral Health Treatment Plan and Problem list.    SUBJECTIVE    CC/ Focus of exam: depression    Patient's subjective status: \"I'm OK\"    INTERVAL HISTORY:  Presents this morning slightly irritable and moderately depressed without a sense of hopelessness or SI. Slept fairly well and appetite improved. Working passively with her therapist towards a disposition plan, see notes.     \"Guess I'll go to [facility], where I came from, can I have today to get everything situated\". Mary phone [phone], she says she will call him this morning. Plan B is to explore shelter options.       Depression rating 5/10  Anxiety rating 5/10  Sleep:10:30 to 7 AM     ROS: No cardiovascular, GI, or Neurological complaints.       OBJECTIVE    Vitals:    23 0756   BP: 113/76   Pulse: 89   Resp:    Temp:    SpO2:       Temp:  [98.7 °F (37.1 °C)-99.7 °F (37.6 °C)] 99.7 °F (37.6 °C)  Heart Rate:  [80-89] 89  Resp:  [16-20] 16  BP: (103-117)/(70-78) 113/76    MENTAL STATUS EXAM:         Psychomotor: No psychomotor agitation/retardation, No EPS, No motor tics  Speech-normal rate, amount.  Mood/Affect:  Restricted  Thought Processes:  coherent  Thought Content:  mood congruent  Hallucination(s): none  Hopelessness: No  Optimistic:minimally  Suicidal Thoughts:   No  Suicidal Plan/Intent:  No  Homicidal Thoughts:  absent  Orientation: oriented x 3  Memory: recent intact    Lab Results (last 24 hours)     ** No results found for the last 24 hours. **           Imaging Results (Last 24 Hours)     ** No results found for the last 24 hours. **           Lab and x-ray studies reviewed.    ECG/EMG Results (most recent)     Procedure Component Value Units Date/Time    ECG 12 Lead Other [[id number]] Collected: 23 2144 "     Updated: 01/29/23 1836     QT Interval 376 ms      QTC Interval 414 ms     Narrative:      Test Reason : Baseline Cardiac Status~  Blood Pressure :   */*   mmHG  Vent. Rate :  73 BPM     Atrial Rate :  73 BPM     P-R Int : 152 ms          QRS Dur :  78 ms      QT Int : 376 ms       P-R-T Axes :  80  68  50 degrees     QTc Int : 414 ms    Normal sinus rhythm  Possible Left atrial enlargement  Anterior infarct , age undetermined  Abnormal ECG  When compared with ECG of 27-DEC-2019 10:47,  No significant change was found  Confirmed by Sammy Chavarria ([id number]) on 1/29/2023 6:34:34 PM    Referred By: BRANDON ALDANA           Confirmed By: Sammy Chavarria    ECG 12 Lead [[id number]] Collected: 01/26/23 1659     Updated: 01/29/23 1846     QT Interval 372 ms      QTC Interval 407 ms     Narrative:      Test Reason : pre ect workup  Blood Pressure :   */*   mmHG  Vent. Rate :  72 BPM     Atrial Rate :  72 BPM     P-R Int : 144 ms          QRS Dur :  94 ms      QT Int : 372 ms       P-R-T Axes :  81  78  86 degrees     QTc Int : 407 ms    Normal sinus rhythm  Normal ECG  When compared with ECG of 25-JAN-2023 21:44, (Unconfirmed)  No significant change was found  Confirmed by Sammy Chavarria ([id number]) on 1/29/2023 6:37:28 PM    Referred By: KATYA           Confirmed By: Sammy Chavarria           ALLERGIES: Patient has no known allergies.    Medications:     aspirin, 81 mg, Oral, Daily  clopidogrel, 75 mg, Oral, Daily  megestrol, 400 mg, Oral, Daily  mirtazapine, 45 mg, Oral, Nightly  multivitamin with minerals, 1 tablet, Oral, Daily  nicotine, 1 patch, Transdermal, Q24H  QUEtiapine, 100 mg, Oral, Nightly       ASSESSMENT & PLAN    Major depressive disorder, recurrent (HCC)  - Tapered off the venlafaxine and paroxetine   -2/9/2023: Increased Mirtazapine to 45 mg at bedtime  -2/2/23: Increased quetiapine 100 mg hs  -Patient no longer wishes to proceed with ECT     - To provide for a supportive individual and group psychotherapeutic  effort.     Coronary artery disease s/p PCI/stenting in past  - Hospitalist consult 01/26/23 recommended continuing with DAPT - aspirin and Plavix  - EKG without acute changes  - Patient w/o chest pain or anginal equivalent   - Hospitalist consult did advise that patient at acceptable risk for ECT     Hypertension  -Discontinue lisinopril due to continued low BP and dizziness     Substance abuse (HCC)  -   Does not seem to be a current issue      Special precautions: Special Precautions Level 3 (q15 min checks)     Behavioral Health Treatment Plan and Problem List: I have reviewed and approved the Behavioral Health Treatment Plan and Problem list.    I spent a total of 26 minutes in direct patient care including  17 minutes face to face with the patient assessment, coordination of care, and counseling the patient on the current and follow-up treatment plans regarding her status and situation.  Patient had no additional questions.     MELINDA Vergara MD    Clinician:  Gautam Vergara MD  02/14/23  09:44 EST    Dictated utilizing Dragon dictation

## 2023-02-14 NOTE — PLAN OF CARE
Goal Outcome Evaluation:  Plan of Care Reviewed With: patient  Patient Agreement with Plan of Care: agrees     Progress: improving  Outcome Evaluation: PT. RATES ANXIETY 7 RATES DEPRESSION 10 DENIES S/I DENIES H/I PT. DISCHARGED TODAY

## 2023-02-15 NOTE — PAYOR COMM NOTE
"Tan Borges (52 y.o. Female)     Date of Birth   1970    Social Security Number       Address   [address] [address]    Home Phone   [phone]    MRN   [id number]       [facility]    Marital Status                               Admission Date   1/25/23    Admission Type   Urgent    Admitting Provider   Gautam Vergara MD    Attending Provider       Department, Room/Bed   [facility] ADULT PSYCHIATRIC, 1020/1S       Discharge Date   2/14/2023    Discharge Disposition   Home or Self Care    Discharge Destination                               Attending Provider: (none)   Allergies: No Known Allergies    Isolation: None   Infection: None   Code Status: Prior    Ht: 167.6 cm (66\")   Wt: 47.6 kg (105 lb)    Admission Cmt: None   Principal Problem: Major depressive disorder, recurrent (HCC) [F33.9]                 Active Insurance as of 1/25/2023     Primary Coverage     Payor Plan Insurance Group Employer/Plan Group    [employer] [place] MEDICARE REPLACEMENT [facility]      Payor Plan Address Payor Plan Phone Number Payor Plan Fax Number Effective Dates    [address] [address] [phone]  1/25/2023 - None Entered    [facility] [id number]       Subscriber Name Subscriber Birth Date Member ID       TAN BORGES 1970 [id number]                 Emergency Contacts      (Rel.) Home Phone Work Phone Mobile Phone    LISA MCKEON [phone] -- --            Gautam Vergara MD     Physician    Psychiatry       Discharge Summary   The patient can view the shared note after they get an active [employer] account This note has been shared with the patient    Signed       Date of Service:  02/14/23 1153    Creation Time:  02/14/23 1153                   Signed                                                                                                                                                                            "                                                                                                                    Date of Discharge:  2/14/2023         Discharge Diagnosis:Principal Problem:      Major depressive disorder, recurrent (HCC)    Active Problems:      Coronary artery disease      Hypertension      Substance abuse (HCC)      UTI (urinary tract infection)                   Presenting Problem/History of Present Illness:Patient was admitted to the hospital on January 25 having presented depressed referred here from the [facility] psychiatric unit for ECT, treatment voluntarily admitted for safety evaluation and treatment, see admission note for details.                   Hospital Course:         Patient was admitted for safety and stabilization and was placed on standard precautions.  Routine labs were checked.  Patient was assigned a master's level therapist and provided with an opportunity to participate in group and individual therapy on the unit.  Patient seen on a daily basis for evaluation and supportive therapy.  Patient hospitalized in psychotropic medications  Reformatted evolving to combination of mirtazapine 45 mg at bedtime and quetiapine 100 mg at bedtime.  Patient received 5 bifrontal ECT treatments with only minimal benefit.  At discharge patient had moderate degree of residual dysthymia but was denying any sense of hopelessness or any thoughts of self-harm or harming others.  No psychotic symptoms demonstrated throughout her stay.  At discharge patient electing to return to her boyfriend's residence, she had been living with him for the past year PTA.  [facility] [place].    See below for details and medications.          consults:                     Consults                                            Date and Time       Order Name       Status       Description               1/27/2023  9:41 AM     Inpatient Hospitalist  Consult                         1/26/2023 12:02 PM     Inpatient Hospitalist Consult                         1/26/2023  8:11 AM     Inpatient Hospitalist Consult     Completed                                     Labs:                      Lab Results (all)                                                Procedure       Component       Value       Units       Date/Time               Urine Culture - Urine, Urine, Clean Catch [[id number]]  (Abnormal)  (Susceptibility)     Collected: 01/26/23 0954             Specimen: Urine, Clean Catch     Updated: 01/28/23 0950                   Urine Culture     >100,000 CFU/mL Escherichia coliAbnormal             Narrative:               Colonization of the urinary tract without infection is common. Treatment is discouraged unless the patient is symptomatic, pregnant, or undergoing an invasive urologic procedure.                              Susceptibility                                Escherichia coli                         TREY                         Ampicillin     Susceptible                         Ampicillin + Sulbactam     Susceptible                         Cefazolin     Susceptible                         Cefepime     Susceptible                         Ceftazidime     Susceptible                         Ceftriaxone     Susceptible                         Gentamicin     Susceptible                         Levofloxacin     Susceptible                         Nitrofurantoin     Susceptible                         Piperacillin + Tazobactam     Susceptible                         Trimethoprim + Sulfamethoxazole     Resistant                                                                                   Comprehensive Metabolic Panel [[id number]]  (Abnormal)     Collected: 01/27/23 1036             Specimen: Blood     Updated: 01/27/23 1115                   Glucose     134High     mg/dL                         BUN     17     mg/dL                         Creatinine     0.68      mg/dL                         Sodium     138     mmol/L                         Potassium     3.7     mmol/L                         Chloride     106     mmol/L                         CO2     23.9     mmol/L                         Calcium     8.7     mg/dL                         Total Protein     5.9Low     g/dL                         Albumin     3.5     g/dL                         ALT (SGPT)     10     U/L                         AST (SGOT)     12     U/L                         Alkaline Phosphatase     78     U/L                         Total Bilirubin     0.2     mg/dL                         Globulin     2.4     gm/dL                         A/G Ratio     1.5     g/dL                         BUN/Creatinine Ratio     25.0                   Anion Gap     8.1     mmol/L                         eGFR     105.6     mL/min/1.73                   Narrative:               GFR Normal >60    Chronic Kidney Disease <60    Kidney Failure <15                  TSH [[id number]]  (Normal)     Collected: 01/26/23 1216             Specimen: Blood     Updated: 01/26/23 1243                   TSH     1.550     uIU/mL                   T4, Free [[id number]]  (Normal)     Collected: 01/26/23 1216             Specimen: Blood     Updated: 01/26/23 1243                   Free T4     0.93     ng/dL                   Narrative:               Results may be falsely increased if patient taking Biotin.                  Comprehensive Metabolic Panel [[id number]]  (Abnormal)     Collected: 01/26/23 0957             Specimen: Blood     Updated: 01/26/23 1047                   Glucose     92     mg/dL                         BUN     18     mg/dL                         Creatinine     0.66     mg/dL                         Sodium     139     mmol/L                         Potassium     3.8     mmol/L                         Chloride     105     mmol/L                         CO2     25.6     mmol/L                         Calcium     9.1      mg/dL                         Total Protein     5.9Low     g/dL                         Albumin     3.4Low     g/dL                         ALT (SGPT)     6     U/L                         AST (SGOT)     13     U/L                         Alkaline Phosphatase     76     U/L                         Total Bilirubin     0.2     mg/dL                         Globulin     2.5     gm/dL                         A/G Ratio     1.4     g/dL                         BUN/Creatinine Ratio     27.3High                   Anion Gap     8.4     mmol/L                         eGFR     106.4     mL/min/1.73                   Narrative:               GFR Normal >60    Chronic Kidney Disease <60    Kidney Failure <15                  Magnesium [[id number]]  (Normal)     Collected: 01/26/23 0957             Specimen: Blood     Updated: 01/26/23 1047                   Magnesium     1.8     mg/dL                   CBC & Differential [[id number]]  (Abnormal)     Collected: 01/26/23 0957             Specimen: Blood     Updated: 01/26/23 1028             Narrative:               The following orders were created for panel order CBC & Differential.    Procedure                               Abnormality         Status                       ---------                               -----------         ------                       CBC Auto Differential[[id number]]        Abnormal            Final result                      Please view results for these tests on the individual orders.             CBC Auto Differential [[id number]]  (Abnormal)     Collected: 01/26/23 0957             Specimen: Blood     Updated: 01/26/23 1028                   WBC     8.43     10*3/mm3                         RBC     4.06     10*6/mm3                         Hemoglobin     12.7     g/dL                         Hematocrit     38.0     %                         MCV     93.6     fL                         MCH     31.3     pg                         MCHC     33.4      g/dL                         RDW     12.5     %                         RDW-SD     43.1     fl                         MPV     9.3     fL                         Platelets     239     10*3/mm3                         Neutrophil %     76.7High     %                         Lymphocyte %     17.9Low     %                         Monocyte %     4.4Low     %                         Eosinophil %     0.2Low     %                         Basophil %     0.4     %                         Immature Grans %     0.4     %                         Neutrophils, Absolute     6.47     10*3/mm3                         Lymphocytes, Absolute     1.51     10*3/mm3                         Monocytes, Absolute     0.37     10*3/mm3                         Eosinophils, Absolute     0.02     10*3/mm3                         Basophils, Absolute     0.03     10*3/mm3                         Immature Grans, Absolute     0.03     10*3/mm3                         nRBC     0.0     /100 WBC                   Urine Drug Screen - Urine, Clean Catch [[id number]]  (Normal)     Collected: 01/26/23 0954             Specimen: Urine, Clean Catch     Updated: 01/26/23 1011                   THC, Screen, Urine     Negative                   Phencyclidine (PCP), Urine     Negative                   Cocaine Screen, Urine     Negative                   Methamphetamine, Ur     Negative                   Opiate Screen     Negative                   Amphetamine Screen, Urine     Negative                   Benzodiazepine Screen, Urine     Negative                   Tricyclic Antidepressants Screen     Negative                   Methadone Screen, Urine     Negative                   Barbiturates Screen, Urine     Negative                   Oxycodone Screen, Urine     Negative                   Propoxyphene Screen     Negative                   Buprenorphine, Screen, Urine     Negative             Narrative:               Cutoff For Drugs Screened:          Amphetamines               500 ng/ml    Barbiturates               200 ng/ml    Benzodiazepines            150 ng/ml    Cocaine                    150 ng/ml    Methadone                  200 ng/ml    Opiates                    100 ng/ml    Phencyclidine               25 ng/ml    THC                                         50 ng/ml    Methamphetamine            500 ng/ml    Tricyclic Antidepressants  300 ng/ml    Oxycodone                  100 ng/ml    Propoxyphene               300 ng/ml    Buprenorphine               10 ng/ml         The normal value for all drugs tested is negative. This report includes unconfirmed screening results, with the cutoff values listed, to be used for medical treatment purposes only.  Unconfirmed results must not be used for non-medical purposes such as employment or legal testing.  Clinical consideration should be applied to any drug of abuse test, particularly when unconfirmed results are used.               Urinalysis With Microscopic If Indicated (No Culture) - Urine, Clean Catch [[id number]]  (Abnormal)     Collected: 01/26/23 0954             Specimen: Urine, Clean Catch     Updated: 01/26/23 1006                   Color, UA     Yellow                   Appearance, UA     Clear                   pH, UA     7.5                   Specific Gravity, UA     >1.030High                   Glucose, UA     Negative                   Ketones, UA     Negative                   Bilirubin, UA     Negative                   Blood, UA     Small (1+)Abnormal                   Protein, UA     Negative                   Leuk Esterase, UA     Small (1+)Abnormal                   Nitrite, UA     Negative                   Urobilinogen, UA     1.0 E.U./dL             Urinalysis, Microscopic Only - Urine, Clean Catch [[id number]]  (Abnormal)     Collected: 01/26/23 0954             Specimen: Urine, Clean Catch     Updated: 01/26/23 1006                   RBC, UA     3-5Abnormal     /HPF                          WBC, UA     Too Numerous to CountAbnormal     /HPF                         Bacteria, UA     3+Abnormal     /HPF                         Squamous Epithelial Cells, UA     0-2     /HPF                         Hyaline Casts, UA     3-6     /LPF                         Methodology     Automated Microscopy             Pregnancy, Urine - Urine, Clean Catch [[id number]]  (Normal)     Collected: 01/26/23 0954             Specimen: Urine, Clean Catch     Updated: 01/26/23 1005                   HCG, Urine QL     Negative             Narrative:               Diluted specimens may cause false negative results.             Comprehensive Metabolic Panel [[id number]]  (Abnormal)     Collected: 01/25/23 2043             Specimen: Blood     Updated: 01/25/23 2111                   Glucose     96     mg/dL                         BUN     24High     mg/dL                         Creatinine     0.77     mg/dL                         Sodium     138     mmol/L                         Potassium     3.9     mmol/L                         Chloride     105     mmol/L                         CO2     24.7     mmol/L                         Calcium     9.0     mg/dL                         Total Protein     6.1     g/dL                         Albumin     3.5     g/dL                         ALT (SGPT)     8     U/L                         AST (SGOT)     10     U/L                         Alkaline Phosphatase     69     U/L                         Total Bilirubin     <0.2     mg/dL                         Globulin     2.6     gm/dL                         A/G Ratio     1.3     g/dL                         BUN/Creatinine Ratio     31.2High                   Anion Gap     8.3     mmol/L                         eGFR     93.5     mL/min/1.73                   Narrative:               GFR Normal >60    Chronic Kidney Disease <60    Kidney Failure <15                  CBC & Differential [[id number]]  (Abnormal)     Collected: 01/25/23 2043              Specimen: Blood     Updated: 01/25/23 2051             Narrative:               The following orders were created for panel order CBC & Differential.    Procedure                               Abnormality         Status                       ---------                               -----------         ------                       CBC Auto Differential[[id number]]        Abnormal            Final result                      Please view results for these tests on the individual orders.             CBC Auto Differential [[id number]]  (Abnormal)     Collected: 01/25/23 2043             Specimen: Blood     Updated: 01/25/23 2051                   WBC     9.36     10*3/mm3                         RBC     3.75Low     10*6/mm3                         Hemoglobin     11.9Low     g/dL                         Hematocrit     35.6     %                         MCV     94.9     fL                         MCH     31.7     pg                         MCHC     33.4     g/dL                         RDW     12.6     %                         RDW-SD     43.8     fl                         MPV     9.1     fL                         Platelets     232     10*3/mm3                         Neutrophil %     75.4     %                         Lymphocyte %     18.1Low     %                         Monocyte %     5.4     %                         Eosinophil %     0.6     %                         Basophil %     0.3     %                         Immature Grans %     0.2     %                         Neutrophils, Absolute     7.05High     10*3/mm3                         Lymphocytes, Absolute     1.69     10*3/mm3                         Monocytes, Absolute     0.51     10*3/mm3                         Eosinophils, Absolute     0.06     10*3/mm3                         Basophils, Absolute     0.03     10*3/mm3                         Immature Grans, Absolute     0.02     10*3/mm3                         nRBC     0.0     /100 WBC                                      Imaging:                   Imaging Results (All)                                          Procedure       Component       Value       Units       Date/Time               XR Spine Lumbar Lateral [[id number]]     Collected: 01/26/23 1345                   Updated: 01/26/23 1349             Narrative:               EXAM:      XR Lumbosacral Spine, 2 or 3 Views         EXAM DATE:      1/26/2023 1:29 PM         CLINICAL HISTORY:      ELECTROCONVULSIVE THERAPY         TECHNIQUE:      Frontal and lateral views of the lumbar spine and sacrum.         COMPARISON:      No relevant prior studies available.         FINDINGS:      VERTEBRAE:  Unremarkable.  No acute fracture.  Normal alignment.      SACRUM/COCCYX:  Unremarkable as visualized.  No acute fracture.      DISC SPACES:  No acute findings.  No significant narrowing.      SOFT TISSUES:  Unremarkable.                  Impression:                 No acute findings in the lumbar spine.         This report was finalized on 1/26/2023 1:45 PM by Dr. Gurpreet Aguilar MD.                  CT Head With & Without Contrast [[id number]]     Collected: 01/26/23 1034                   Updated: 01/26/23 1037             Narrative:               CT HEAD W WO CONTRAST-         CLINICAL INDICATION: workup for ECT; F33.2-Major depressive disorder,    recurrent severe without psychotic features              COMPARISON: None available          TECHNIQUE: Axial images of the brain were obtained with out intravenous    contrast.  Reformatted images were created in the sagittal and coronal    planes. Images were then acquired after IV contrast         DOSE:         Radiation dose reduction techniques were utilized per ALARA protocol.    Automated exposure control was initiated through either or Front App or    DoseRight software packages by  protocol.            FINDINGS:      BRAIN:  Unremarkable.  No hemorrhage.  No significant white matter    disease.  No  edema.           VENTRICLES:  Unremarkable.  No ventriculomegaly.           BONES/JOINTS:  Unremarkable.  No acute fracture.           SOFT TISSUES:  Unremarkable.           SINUSES:  no air fluid levels           MASTOID AIR CELLS:  Unremarkable as visualized.  No mastoid effusion.                       Impression:                     1. No parenchymal mass, hemorrhage, or midline shift    2. No contrast-enhancing abnormalities         This report was finalized on 1/26/2023 10:35 AM by Dr. Gurpreet Aguilar MD.                  XR Chest 1 View [[id number]]     Collected: 01/26/23 1032                   Updated: 01/26/23 1034             Narrative:               XR CHEST 1 VW-         CLINICAL INDICATION: workup for ECT; F33.2-Major depressive disorder,    recurrent severe without psychotic features              COMPARISON: 12/27/2019          TECHNIQUE: Single frontal view of the chest.         FINDINGS:          LUNGS: Lungs are adequately aerated.          HEART AND MEDIASTINUM: Heart and mediastinal contours are unremarkable              SKELETON: Bony and soft tissue structures are unremarkable.                            Impression:               No radiographic evidence of acute cardiac or pulmonary disease.         This report was finalized on 1/26/2023 10:32 AM by Dr. Gurpreet Aguilar MD.                                    Condition on Discharge:  improved         Prognosis: Guarded         Vital Signs    Temp:  [98.7 °F (37.1 °C)-99.7 °F (37.6 °C)] 99.7 °F (37.6 °C)    Heart Rate:  [80-89] 89    Resp:  [16-20] 16    BP: (103-117)/(70-78) 113/76         Discharge Disposition             Discharge Medications                        Discharge Medications                           ASK your doctor about these medications                                    Instructions       Start Date         aspirin 81 MG EC tablet          81 mg, Oral, Daily                  busPIRone 10 MG tablet    Commonly known as: BUSPAR           20 mg, Oral, 2 Times Daily                  clopidogrel 75 MG tablet    Commonly known as: PLAVIX          75 mg, Oral, Daily                  diphenhydrAMINE 25 mg capsule    Commonly known as: BENADRYL          25 mg, Oral, Every 6 Hours PRN                  lisinopril 10 MG tablet    Commonly known as: PRINIVILZESTRIL    Ask about: Which instructions should I use?          10 mg, Oral, Daily                  megestrol 40 MG/ML suspension    Commonly known as: MEGACE          400 mg, Oral, Daily                  mirtazapine 15 MG tablet    Commonly known as: REMERON          15 mg, Oral, Nightly                  PARoxetine 30 MG tablet    Commonly known as: PAXIL          60 mg, Oral, Every Morning                  QUEtiapine 50 MG tablet    Commonly known as: SEROquel          50 mg, Oral, Nightly PRN                  QUEtiapine 200 MG tablet    Commonly known as: SEROquel          200 mg, Oral, 2 Times Daily                  venlafaxine  MG 24 hr capsule    Commonly known as: EFFEXOR-XR          150 mg, Oral, Daily, Takes with 75mg                  venlafaxine XR 75 MG 24 hr capsule    Commonly known as: EFFEXOR-XR          75 mg, Oral, Daily, Takes with 150mg                                          Discharge Diet: Regular         Activity at Discharge: no restrictions         Follow-up Appointments:                    [address] [address] [phone]         February 20 2023 at 8:30am.                           Gautam Vergara MD    02/14/23    11:53 EST    Time spent with the discharge process >30 minutes.          Dictated utilizing Dragon dictation                             Routing History                                         Discharge Richardl      Delmis Decker RN at 02/14/23 1905        Goal Outcome Evaluation:  Plan of Care Reviewed With: patient  Patient Agreement with Plan of Care: agrees     Progress: improving  Outcome Evaluation: PT.  RATES ANXIETY 7 RATES DEPRESSION 10 DENIES S/I DENIES H/I PT. DISCHARGED TODAY    Electronically signed by Delmis Decker, RN at 02/14/23 1655     Delmis Decker, RN at 02/14/23 1601        Regular diet    Electronically signed by Delmis Decker, RN at 02/14/23 1601     Delmis Decker, RN at 02/14/23 1601        As tolerated     Electronically signed by Delmis Decker RN at 02/14/23 1601     Charlene Monroe at 02/14/23 1514        RTEC: Patient is being discharged on this day.     Electronically signed by Charlene Monroe at 02/14/23 1515     Charlene Monroe at 02/14/23 1044        Patient has been scheduled the following appointment:     [facility] [id number] , [place] [address]  [phone]    February 20 2023 at 8:30am.     Electronically signed by Charlene Monroe at 02/14/23 1044     Charlene Monroe at 02/14/23 1030        [facility]  [address], [place] [address]  [phone]    February 20 2023 at 8:30am.     Electronically signed by Charlene Monroe at 02/14/23 1043     Carol Ann Jin at 02/14/23 1015        Goal Outcome Evaluation:   Consent Given to Review Plan with: brother Verduzco  Progress: improving  Outcome Evaluation: Therapist met with patient to review plan of care; patient agreeable.       DATA:      Therapist discussed case with Dr. Vergara and met with patient today to review coping skills, review plan of care, and discuss discharge.    Therapist obtained consent for Gray Fabian. Therapist contacted Gray to complete safety planning. Gray is agreeable with the patient coming home today and denies any concerns. Therapist advised that the patient should not have access to firearms/weapons, medications, or sharp objects (knives/scissors). Gray is agreeable. He states there are no firearms in the home and he will secure other potentially harmful objects.     Therapist recommends outpatient medication management and therapy.  Patient is agreeable and consents to [facility].      Clinical Maneuvering/Intervention:     Therapist assisted patient in processing above session content; acknowledged and normalized patient’s thoughts, feelings, and concerns.  Discussed the therapist/patient relationship and explain the parameters and limitations of relative confidentiality.  Also discussed the importance of active participation, and honesty to the treatment process.  Encouraged the patient to discuss/vent their feelings, frustrations, and fears concerning their ongoing medical issues and validated their feelings.     Allowed patient to freely discuss issues without interruption or judgment. Provided safe, confidential environment to facilitate the development of positive therapeutic relationship and encourage open, honest communication.      Therapist addressed discharge safety planning this date. Assisted patient in identifying risk factors which would indicate the need for higher level of care after discharge;  including thoughts to harm self or others and/or self-harming behavior. Encouraged patient to call 911, or present to the nearest emergency room should any of these events occur. Discussed crisis intervention services and means to access.  Encouraged securing any objects of harm.    Therapist completed integrated summary, treatment plan, and initiated social history this date.  Therapist is strongly encouraging family involvement in treatment.       Encouraged mask wearing, social distancing, and regular hand washing due to COVID19 risk.      ASSESSMENT:      Therapist met 1:1 with patient today. Patient denies suicidal ideation. Patient denies homicidal ideation. Patient denies AVH. Patient is cooperative with assessment. She reports feeling ready to go home today. Patient is future oriented and identifies protective factors. She is agreeable to return to the nearest ED/contact 911 if she experiences SI/HI/AVH.      PLAN:       Patient  "anticipating discharge today.       Electronically signed by Carol Ann Jin at 23 1022     Gautam Vergara MD at 23 0957          INPATIENT PSYCHIATRIC PROGRESS NOTE    Name:  Estephania Lucas  :  1970  MRN:  [id number]  Visit Number:  [id number]  Length of stay:  20    Behavioral Health Treatment Plan and Problem List: I have reviewed and approved the Behavioral Health Treatment Plan and Problem list.    SUBJECTIVE    CC/ Focus of exam: depression    Patient's subjective status: \"I'm OK\"    INTERVAL HISTORY:  Presents this morning slightly irritable and moderately depressed without a sense of hopelessness or SI. Slept fairly well and appetite improved. Working passively with her therapist towards a disposition plan, see notes.     \"Guess I'll go to [place], where I came from, can I have today to get everything situated\". Mary phone [phone], she says she will call him this morning. Plan B is to explore shelter options.       Depression rating 5/10  Anxiety rating 5/10  Sleep:10:30 to 7 AM     ROS: No cardiovascular, GI, or Neurological complaints.       OBJECTIVE    Vitals:    23 0756   BP: 113/76   Pulse: 89   Resp:    Temp:    SpO2:       Temp:  [98.7 °F (37.1 °C)-99.7 °F (37.6 °C)] 99.7 °F (37.6 °C)  Heart Rate:  [80-89] 89  Resp:  [16-20] 16  BP: (103-117)/(70-78) 113/76    MENTAL STATUS EXAM:         Psychomotor: No psychomotor agitation/retardation, No EPS, No motor tics  Speech-normal rate, amount.  Mood/Affect:  Restricted  Thought Processes:  coherent  Thought Content:  mood congruent  Hallucination(s): none  Hopelessness: No  Optimistic:minimally  Suicidal Thoughts:   No  Suicidal Plan/Intent:  No  Homicidal Thoughts:  absent  Orientation: oriented x 3  Memory: recent intact    Lab Results (last 24 hours)     ** No results found for the last 24 hours. **           Imaging Results (Last 24 Hours)     ** No results found for the last 24 hours. **           Lab and x-ray " studies reviewed.    ECG/EMG Results (most recent)     Procedure Component Value Units Date/Time    ECG 12 Lead Other [[id number]] Collected: 01/25/23 2144     Updated: 01/29/23 1836     QT Interval 376 ms      QTC Interval 414 ms     Narrative:      Test Reason : Baseline Cardiac Status~  Blood Pressure :   */*   mmHG  Vent. Rate :  73 BPM     Atrial Rate :  73 BPM     P-R Int : 152 ms          QRS Dur :  78 ms      QT Int : 376 ms       P-R-T Axes :  80  68  50 degrees     QTc Int : 414 ms    Normal sinus rhythm  Possible Left atrial enlargement  Anterior infarct , age undetermined  Abnormal ECG  When compared with ECG of 27-DEC-2019 10:47,  No significant change was found  Confirmed by Sammy Chavarria ([id number]) on 1/29/2023 6:34:34 PM    Referred By: BRANDON ALDANA           Confirmed By: Sammy Chavarria    ECG 12 Lead [[id number]] Collected: 01/26/23 1659     Updated: 01/29/23 1846     QT Interval 372 ms      QTC Interval 407 ms     Narrative:      Test Reason : pre ect workup  Blood Pressure :   */*   mmHG  Vent. Rate :  72 BPM     Atrial Rate :  72 BPM     P-R Int : 144 ms          QRS Dur :  94 ms      QT Int : 372 ms       P-R-T Axes :  81  78  86 degrees     QTc Int : 407 ms    Normal sinus rhythm  Normal ECG  When compared with ECG of 25-JAN-2023 21:44, (Unconfirmed)  No significant change was found  Confirmed by Sammy Chavarria ([id number]) on 1/29/2023 6:37:28 PM    Referred By: KATYA           Confirmed By: Sammy Chavarria           ALLERGIES: Patient has no known allergies.    Medications:     aspirin, 81 mg, Oral, Daily  clopidogrel, 75 mg, Oral, Daily  megestrol, 400 mg, Oral, Daily  mirtazapine, 45 mg, Oral, Nightly  multivitamin with minerals, 1 tablet, Oral, Daily  nicotine, 1 patch, Transdermal, Q24H  QUEtiapine, 100 mg, Oral, Nightly       ASSESSMENT & PLAN    Major depressive disorder, recurrent (HCC)  - Tapered off the venlafaxine and paroxetine   -2/9/2023: Increased Mirtazapine to 45 mg at bedtime  -2/2/23:  Increased quetiapine 100 mg hs  -Patient no longer wishes to proceed with ECT     - To provide for a supportive individual and group psychotherapeutic effort.     Coronary artery disease s/p PCI/stenting in past  - Hospitalist consult 01/26/23 recommended continuing with DAPT - aspirin and Plavix  - EKG without acute changes  - Patient w/o chest pain or anginal equivalent   - Hospitalist consult did advise that patient at acceptable risk for ECT     Hypertension  -Discontinue lisinopril due to continued low BP and dizziness     Substance abuse (HCC)  -   Does not seem to be a current issue      Special precautions: Special Precautions Level 3 (q15 min checks)     Behavioral Health Treatment Plan and Problem List: I have reviewed and approved the Behavioral Health Treatment Plan and Problem list.    I spent a total of 26 minutes in direct patient care including  17 minutes face to face with the patient assessment, coordination of care, and counseling the patient on the current and follow-up treatment plans regarding her status and situation.  Patient had no additional questions.     MELINDA Vergara MD    Clinician:  Gautam Vergara MD  02/14/23  09:44 EST    Dictated utilizing Dragon dictation       Electronically signed by Gautam Vergara MD at 02/14/23 1000     Dari Jin, RN at 02/14/23 0152        Goal Outcome Evaluation:  Plan of Care Reviewed With: patient        Progress: no change  Outcome Evaluation: Pt rated anxiety and depression a 10/10. PT stated she was still not sleeping and had no appetite. PT stated she had no si/hi or avh. Pt stayed in her vin for the entire shift and had no issues or concerns.    Electronically signed by Dari Jin, RN at 02/14/23 0152     Elke Quinones, RN at 02/13/23 8817        Goal Outcome Evaluation:  Plan of Care Reviewed With: patient  Patient Agreement with Plan of Care: agrees  Consent Given to Review Plan with: Jono Siu  Progress:  "no change  Outcome Evaluation: PT reports not sleeping well. Reports anxiety and depression 10/10. Denies AVH. Refused ECT. Denies SI, HI. PT isolates in room. No complaints this shift. PT calm and cooperative.    Electronically signed by Elke Quinones RN at 02/13/23 1725     Elke Quinones RN at 02/13/23 1456          Problem: Activity and Energy Impairment (Depressive Signs/Symptoms)  Goal: Optimized Energy Level (Depressive Signs/Symptoms)  2/13/2023 1456 by Elke Quinones RN  Outcome: Ongoing, Progressing  2/13/2023 1454 by Elke Quinones RN  Outcome: Ongoing, Progressing  Intervention: Optimize Energy Level  Recent Flowsheet Documentation  Taken 2/13/2023 0930 by Elke Quinones RN  Activity (Behavioral Health): up ad juanjose  Patient Performed Hygiene: dressed   Goal Outcome Evaluation:  Plan of Care Reviewed With: patient  Patient Agreement with Plan of Care: agrees  Consent Given to Review Plan with: Jono Leighyayo          Electronically signed by Elke Quinones RN at 02/13/23 1504     Carol Ann Jin at 02/13/23 0942        Goal Outcome Evaluation:   Consent Given to Review Plan with: brother Verduzco  Progress: improving  Outcome Evaluation: Therapist met with patient to review plan of care; patient agreeable.       DATA:     Therapist discussed case with RN and met with patient today to review coping skills, review plan of care, and discuss discharge.    Therapist contacted patient's brother, Jono. Jono states that when he visited the patient last week, she did seem more \"lively\" than previously. Jono states he offered to allow the patient to stay with him at discharge and she was not interested. Jono states she would not have a room or bed at his home, but could possibly sleep on the couch if she wanted. Jono states he believes the patient plans to go home with her boyfriend, [facility].      Clinical Maneuvering/Intervention:     Therapist assisted patient in processing above session content; acknowledged " and normalized patient’s thoughts, feelings, and concerns.  Discussed the therapist/patient relationship and explain the parameters and limitations of relative confidentiality.  Also discussed the importance of active participation, and honesty to the treatment process.  Encouraged the patient to discuss/vent their feelings, frustrations, and fears concerning their ongoing medical issues and validated their feelings.     Allowed patient to freely discuss issues without interruption or judgment. Provided safe, confidential environment to facilitate the development of positive therapeutic relationship and encourage open, honest communication.      Therapist addressed discharge safety planning this date. Assisted patient in identifying risk factors which would indicate the need for higher level of care after discharge;  including thoughts to harm self or others and/or self-harming behavior. Encouraged patient to call 911, or present to the nearest emergency room should any of these events occur. Discussed crisis intervention services and means to access.  Encouraged securing any objects of harm.    Therapist completed integrated summary, treatment plan, and initiated social history this date.  Therapist is strongly encouraging family involvement in treatment.       Encouraged mask wearing, social distancing, and regular hand washing due to COVID19 risk.      ASSESSMENT:      Therapist met 1:1 with patient today. Patient's affect is less restricted and she is responding more spontaneously in conversation. Patient initially reports she plans to stay with her brother at discharge and states she discussed it with him. Therapist advised the patient that I talked with her brother this morning and he stated he thought the patient was planning to return to [facility]'s house and that he didn't think the patient was interested in staying with him. Therapist advised the patient that her brother states she would likely be sleeping on  "the couch at his house. Patient became irritable, stating \"it's always something\" and that she would have to consider where she would be going at discharge.     It is unclear what her motives are for being evasive/deceptive; however, this has been a pattern throughout her stay.      PLAN:       Patient to remain hospitalized this date.      Treatment team will focus efforts on stabilizing patient's acute symptoms while providing education on healthy coping and crisis management to reduce hospitalizations.   Patient requires daily psychiatrist evaluation and 24/ nursing supervision to promote patient  safety.     Therapist will offer 1-4 individual sessions, 1 therapy group daily, family education, and appropriate referral.      Electronically signed by Carol Ann Jin at 23 1205     Gautam Vergara MD at 23 0832          INPATIENT PSYCHIATRIC PROGRESS NOTE    Name:  Estephania Lucas  :  1970  MRN:  [id number]  Visit Number:  [id number]  Length of stay:  19    Behavioral Health Treatment Plan and Problem List: I have reviewed and approved the Behavioral Health Treatment Plan and Problem list.    SUBJECTIVE    CC/ Focus of exam: depression    Patient's subjective status: \"Doing ok\"    INTERVAL HISTORY: The patient declined addition ECT noting that the 5 treatments have not proven to be beneficial. This morning her subjective status remains depressed but denies feeling hopeless, having feeling she would be better off dead or having suicidal thoughts. Her appetite has improve, she has showered and is dressed in street clothes as apposed to hospital garb. She has a sheepish smile during the interview. Slept well, does continue to prefer to isolate.   Her brother visited several days ago and according to the patient will welcome her in his home. Patient has no preference for follow up - will reschedule. Anticipate discharge tomorrow.     Depression rating 9/10 (subjective)  Anxiety rating " 9/10  Sleep: 10:30 to 7:30, 8-9 hours  Craving: days not     ROS: No cardiovascular, GI, or Neurological complaints.       OBJECTIVE    Vitals:    02/12/23 2001   BP: 113/71   Pulse: 91   Resp: 18   Temp:    SpO2:       Temp:  [97.7 °F (36.5 °C)] 97.7 °F (36.5 °C)  Heart Rate:  [91-99] 91  Resp:  [18] 18  BP: (113-123)/(71-78) 113/71    MENTAL STATUS EXAM:         Psychomotor: No psychomotor agitation/retardation, No EPS, No motor tics  Speech-normal rate, amount.  Mood/Affect:  Depressed  Thought Processes:  coherent  Thought Content:  mood congruent  Hallucination(s): none  Hopelessness: No  Optimistic:minimally  Suicidal Thoughts:   No  Suicidal Plan/Intent:  No  Homicidal Thoughts:  absent  Orientation: oriented x 3  Memory: recent intact    Lab Results (last 24 hours)     ** No results found for the last 24 hours. **           Imaging Results (Last 24 Hours)     ** No results found for the last 24 hours. **           Lab and x-ray studies reviewed.    ECG/EMG Results (most recent)     Procedure Component Value Units Date/Time    ECG 12 Lead Other [[id number]] Collected: 01/25/23 2144     Updated: 01/29/23 1836     QT Interval 376 ms      QTC Interval 414 ms     Narrative:      Test Reason : Baseline Cardiac Status~  Blood Pressure :   */*   mmHG  Vent. Rate :  73 BPM     Atrial Rate :  73 BPM     P-R Int : 152 ms          QRS Dur :  78 ms      QT Int : 376 ms       P-R-T Axes :  80  68  50 degrees     QTc Int : 414 ms    Normal sinus rhythm  Possible Left atrial enlargement  Anterior infarct , age undetermined  Abnormal ECG  When compared with ECG of 27-DEC-2019 10:47,  No significant change was found  Confirmed by Sammy Chavarria ([id number]) on 1/29/2023 6:34:34 PM    Referred By: BRANDON ALDANA           Confirmed By: Sammy Chavarria    ECG 12 Lead [[id number]] Collected: 01/26/23 1659     Updated: 01/29/23 1846     QT Interval 372 ms      QTC Interval 407 ms     Narrative:      Test Reason : pre ect workup  Blood  Pressure :   */*   mmHG  Vent. Rate :  72 BPM     Atrial Rate :  72 BPM     P-R Int : 144 ms          QRS Dur :  94 ms      QT Int : 372 ms       P-R-T Axes :  81  78  86 degrees     QTc Int : 407 ms    Normal sinus rhythm  Normal ECG  When compared with ECG of 25-JAN-2023 21:44, (Unconfirmed)  No significant change was found  Confirmed by Sammy Chavarria ([id number]) on 1/29/2023 6:37:28 PM    Referred By: KATYA           Confirmed By: Sammy Chavarria           ALLERGIES: Patient has no known allergies.    Medications:     aspirin, 81 mg, Oral, Daily  clopidogrel, 75 mg, Oral, Daily  megestrol, 400 mg, Oral, Daily  mirtazapine, 45 mg, Oral, Nightly  multivitamin with minerals, 1 tablet, Oral, Daily  nicotine, 1 patch, Transdermal, Q24H  QUEtiapine, 100 mg, Oral, Nightly       ASSESSMENT & PLAN    Major depressive disorder, recurrent (HCC)  - Tapered off the venlafaxine and paroxetine   -2/9/2023: Increased Mirtazapine to 45 mg at bedtime  -2/2/23: Increased quetiapine 100 mg hs  -Patient no longer wishes to proceed with ECT     - To provide for a supportive individual and group psychotherapeutic effort.     Coronary artery disease s/p PCI/stenting in past  - Hospitalist consult 01/26/23 recommended continuing with DAPT - aspirin and Plavix  - EKG without acute changes  - Patient w/o chest pain or anginal equivalent   - Hospitalist consult did advise that patient at acceptable risk for ECT     Hypertension  -Discontinue lisinopril due to continued low BP and dizziness     Substance abuse (HCC)  -   Does not seem to be a current issue      Special precautions: Special Precautions Level 3 (q15 min checks)     Behavioral Health Treatment Plan and Problem List: I have reviewed and approved the Behavioral Health Treatment Plan and Problem list.    I spent a total of 26 minutes in direct patient care including  17 minutes face to face with the patient assessment, coordination of care, and counseling the patient on the current and  "follow-up treatment plans regarding her status and situation.  Patient had no additional questions.     MELINDA Vergara MD    Clinician:  Gautam Vergara MD  23  08:32 EST    Dictated utilizing Dragon dictation       Electronically signed by Gautam Vergara MD at 23 0846     Carolynn Castillo, RN at 23 0411        Goal Outcome Evaluation:  Plan of Care Reviewed With: patient  Patient Agreement with Plan of Care: agrees        Outcome Evaluation: Patient reports anxiety and depression at 10.  Denies SI/HI or AVH.  Patient calm and cooperative this shift.    Electronically signed by Carolynn Castillo, RN at 23 0411     Bambi Huddleston, RN at 23 163          Problem: Decreased Participation/Engagement (Depressive Signs/Symptoms)  Goal: Increased Participation and Engagement (Depressive Signs/Symptoms)  Outcome: Ongoing, Not Progressing   Goal Outcome Evaluation:                   Electronically signed by Bambi Huddleston, RN at 23 1635     Andry Barrientos MD at 23 1342          INPATIENT PSYCHIATRIC PROGRESS NOTE    Name:  Estephania Lucas  :  1970  MRN:  [id number]  Visit Number:  [id number]  Length of stay:  18    Behavioral Health Treatment Plan and Problem List: I have reviewed and approved the Behavioral Health Treatment Plan and Problem list.    SUBJECTIVE    CC/ Focus of exam: depression    Patient's subjective status: \"The same\"    INTERVAL HISTORY:   Patient reports continued depression, sense of hopelessness, low mood, decreased psychomotor activity.  She reports that she does not want to resume ECT tomorrow morning, saying that the last 5 treatments have not significantly improved her depressive symptoms and she does not want to go through the process again.    Depression rating 10/10  Anxiety rating 6/10  Sleep: Poor     ROS: No constitutional, cardiovascular, respiratory, musculoskeletal, GI, neurological complaints.       OBJECTIVE    Vitals:    " 02/12/23 0804   BP: 113/73   Pulse: 90   Resp:    Temp:    SpO2:       Temp:  [97.9 °F (36.6 °C)] 97.9 °F (36.6 °C)  Heart Rate:  [77-90] 90  Resp:  [18] 18  BP: ()/(60-73) 113/73    MENTAL STATUS EXAM:         Psychomotor: No psychomotor agitation/retardation, No EPS, No motor tics  Speech-normal rate, amount.  Mood/Affect:  Restricted  Thought Processes:  coherent  Thought Content:  negativistic  Hallucination(s): none  Hopelessness: Yes  Optimistic:No  Suicidal Thoughts:   No  Suicidal Plan/Intent:  No  Homicidal Thoughts:  absent  Orientation: oriented x 3  Memory: recent intact    Lab Results (last 24 hours)     ** No results found for the last 24 hours. **           Imaging Results (Last 24 Hours)     ** No results found for the last 24 hours. **           Lab and x-ray studies reviewed.    ECG/EMG Results (most recent)     Procedure Component Value Units Date/Time    ECG 12 Lead Other [[id number]] Collected: 01/25/23 2144     Updated: 01/29/23 1836     QT Interval 376 ms      QTC Interval 414 ms     Narrative:      Test Reason : Baseline Cardiac Status~  Blood Pressure :   */*   mmHG  Vent. Rate :  73 BPM     Atrial Rate :  73 BPM     P-R Int : 152 ms          QRS Dur :  78 ms      QT Int : 376 ms       P-R-T Axes :  80  68  50 degrees     QTc Int : 414 ms    Normal sinus rhythm  Possible Left atrial enlargement  Anterior infarct , age undetermined  Abnormal ECG  When compared with ECG of 27-DEC-2019 10:47,  No significant change was found  Confirmed by Sammy Chavarria (2001) on 1/29/2023 6:34:34 PM    Referred By: BRANDON ALDANA           Confirmed By: Sammy Chavarria    ECG 12 Lead [[id number]] Collected: 01/26/23 1659     Updated: 01/29/23 1846     QT Interval 372 ms      QTC Interval 407 ms     Narrative:      Test Reason : pre ect workup  Blood Pressure :   */*   mmHG  Vent. Rate :  72 BPM     Atrial Rate :  72 BPM     P-R Int : 144 ms          QRS Dur :  94 ms      QT Int : 372 ms       P-R-T Axes :  81   78  86 degrees     QTc Int : 407 ms    Normal sinus rhythm  Normal ECG  When compared with ECG of 25-JAN-2023 21:44, (Unconfirmed)  No significant change was found  Confirmed by Sammy Chavarria (2001) on 1/29/2023 6:37:28 PM    Referred By: KATYA           Confirmed By: Sammy Chavarria           ALLERGIES: Patient has no known allergies.    Medications:     aspirin, 81 mg, Oral, Daily  clopidogrel, 75 mg, Oral, Daily  megestrol, 400 mg, Oral, Daily  mirtazapine, 45 mg, Oral, Nightly  multivitamin with minerals, 1 tablet, Oral, Daily  nicotine, 1 patch, Transdermal, Q24H  QUEtiapine, 100 mg, Oral, Nightly       ASSESSMENT & PLAN    Major depressive disorder, recurrent (HCC)  - Tapered off the venlafaxine and paroxetine   -2/9/2023: Increased Mirtazapine to 45 mg at bedtime  -2/2/23: Increased quetiapine 100 mg hs  -Patient no longer wishes to proceed with ECT     - To provide for a supportive individual and group psychotherapeutic effort.     Coronary artery disease s/p PCI/stenting in past  - Hospitalist consult 01/26/23 recommended continuing with DAPT - aspirin and Plavix  - EKG without acute changes  - Patient w/o chest pain or anginal equivalent   - Hospitalist consult did advise that patient at acceptable risk for ECT     Hypertension  -Discontinue lisinopril due to continued low BP and dizziness     Substance abuse (HCC)  -   Does not seem to be a current issue      Special precautions: Special Precautions Level 3 (q15 min checks)     Behavioral Health Treatment Plan and Problem List: I have reviewed and approved the Behavioral Health Treatment Plan and Problem list.      Clinician:  Andry Barrientos MD  02/12/23  13:42 EST    Dictated utilizing Dragon dictation       Electronically signed by Andry Barrientos MD at 02/12/23 2317     Cee Brown RN at 02/12/23 0953        Patient once again [primarily sitting in bed. Patient  encouraged to shower, get dressed, initially declining ,  states she looks like  "a \" freak\" , educated , given support , encouragement . Patient further states she did shower, noted in room , dressed, will continue to monitor     Electronically signed by Cee Brown RN at 23 0911     Bridget Shelton, RN at 23 0106        Goal Outcome Evaluation:  Plan of Care Reviewed With: patient  Patient Agreement with Plan of Care: agrees        Pt states anxiety 10, depression 10. She states she has trouble falling and staying asleep, though she appears asleep during nursing rounds at night. Pt does state she ate a little better today. Pt is calm and cooperative with staff, med compliant.     Electronically signed by Bridget Shelton RN at 23 0106     Cee Brown RN at 23 1701        Goal Outcome Evaluation:  Plan of Care Reviewed With: patient  Patient Agreement with Plan of Care: agrees         Patient in room , withdrawn, encouraged to take shower , get dressed , hygeine products provided, however, continues to decline hygiene.  Patient encouraged to come out of room to day room, continues to decline. Patient continues to endorse anxiety at 10, depression at 10 on scale of 1-10 with 10 being worse. Patient denies suicidal or homicidal ideationPatient in room , withdrawn, encouraged to take shower , get dressed , hygeine products provided, however, continues to decline. Patient encouraged to come out of room to day room, continues to decline. Patient continues to endorse anxiety at 10, depression at 10 on scale of 1-10 with 10 being worse. Patient denies suicidal or homicidal ideation    Electronically signed by Cee Brown RN at 23 09     Andry Barrientos MD at 23 1429          INPATIENT PSYCHIATRIC PROGRESS NOTE    Name:  Estephania Lucas  :  1970  MRN:  [id number]  Visit Number:  [id number]  Length of stay:  17    Behavioral Health Treatment Plan and Problem List: I have reviewed and approved the Behavioral Health " "Treatment Plan and Problem list.    SUBJECTIVE    CC/ Focus of exam: depression    Patient's subjective status: \"About the same\"    INTERVAL HISTORY:   First time seeing patient.  Chart, notes, vitals, labs and EKG personally reviewed.    Patient reports treatment is \"not going as we hoped.\"  Restricted affect, reported low mood, hopelessness, anxiety, insomnia, isolating.  Reports intermittent dizziness related to low blood pressure, which is why she is refusing lisinopril.    Depression rating 10/10  Anxiety rating 6/10  Sleep: Poor     ROS: No constitutional, cardiovascular, respiratory, musculoskeletal, GI, or complaints.   Neuro: + Intermittent dizziness    OBJECTIVE    Vitals:    02/11/23 0955   BP: 104/66   Pulse: 83   Resp:    Temp:    SpO2:       Temp:  [97.8 °F (36.6 °C)-98.1 °F (36.7 °C)] 97.8 °F (36.6 °C)  Heart Rate:  [] 83  Resp:  [18] 18  BP: ()/(61-78) 104/66    MENTAL STATUS EXAM:         Psychomotor: No psychomotor agitation/retardation, No EPS, No motor tics  Speech-normal rate, amount.  Mood/Affect:  Restricted  Thought Processes:  coherent  Thought Content:  negativistic  Hallucination(s): none  Hopelessness: No  Optimistic:No  Suicidal Thoughts:   No  Suicidal Plan/Intent:  No  Homicidal Thoughts:  absent  Orientation: oriented x 3  Memory: recent intact    Lab Results (last 24 hours)     ** No results found for the last 24 hours. **           Imaging Results (Last 24 Hours)     ** No results found for the last 24 hours. **           Lab and x-ray studies reviewed.    ECG/EMG Results (most recent)     Procedure Component Value Units Date/Time    ECG 12 Lead Other [[id number]] Collected: 01/25/23 2144     Updated: 01/29/23 1836     QT Interval 376 ms      QTC Interval 414 ms     Narrative:      Test Reason : Baseline Cardiac Status~  Blood Pressure :   */*   mmHG  Vent. Rate :  73 BPM     Atrial Rate :  73 BPM     P-R Int : 152 ms          QRS Dur :  78 ms      QT Int : 376 ms       " P-R-T Axes :  80  68  50 degrees     QTc Int : 414 ms    Normal sinus rhythm  Possible Left atrial enlargement  Anterior infarct , age undetermined  Abnormal ECG  When compared with ECG of 27-DEC-2019 10:47,  No significant change was found  Confirmed by Sammy Chavarria ([id number]) on 1/29/2023 6:34:34 PM    Referred By: BRANDON ALDANA           Confirmed By: Sammy Chavarria    ECG 12 Lead [[id number]] Collected: 01/26/23 1659     Updated: 01/29/23 1846     QT Interval 372 ms      QTC Interval 407 ms     Narrative:      Test Reason : pre ect workup  Blood Pressure :   */*   mmHG  Vent. Rate :  72 BPM     Atrial Rate :  72 BPM     P-R Int : 144 ms          QRS Dur :  94 ms      QT Int : 372 ms       P-R-T Axes :  81  78  86 degrees     QTc Int : 407 ms    Normal sinus rhythm  Normal ECG  When compared with ECG of 25-JAN-2023 21:44, (Unconfirmed)  No significant change was found  Confirmed by Sammy Chavarria ([id number]) on 1/29/2023 6:37:28 PM    Referred By: KATYA           Confirmed By: Sammy Chavarria           ALLERGIES: Patient has no known allergies.    Medications:     aspirin, 81 mg, Oral, Daily  clopidogrel, 75 mg, Oral, Daily  [START ON 2/12/2023] lisinopril, 5 mg, Oral, Daily  megestrol, 400 mg, Oral, Daily  mirtazapine, 45 mg, Oral, Nightly  multivitamin with minerals, 1 tablet, Oral, Daily  nicotine, 1 patch, Transdermal, Q24H  QUEtiapine, 100 mg, Oral, Nightly       ASSESSMENT & PLAN    Major depressive disorder, recurrent (HCC)  - Tapered off the venlafaxine and paroxetine   -2/9/2023: Increased Mirtazapine to 45 mg at bedtime  -To 223: Increased quetiapine 100 mg hs  - Proceeded with ECT, patient agreeable.     - To provide for a supportive individual and group psychotherapeutic effort.     Coronary artery disease s/p PCI/stenting in past  - Hospitalist consult 01/26/23 recommended continuing with DAPT - aspirin and Plavix  - EKG without acute changes  - Patient w/o chest pain or anginal equivalent   - Hospitalist  "consult did advise that patient at acceptable risk for ECT     Hypertension  -Discontinue lisinopril due to continued low BP and dizziness     Substance abuse (HCC)  -   Does not seem to be a current issue      Special precautions: Special Precautions Level 3 (q15 min checks)     Behavioral Health Treatment Plan and Problem List: I have reviewed and approved the Behavioral Health Treatment Plan and Problem list.      Clinician:  Andry Barrientos MD  02/11/23  14:29 EST    Dictated utilizing Dragon dictation       Electronically signed by Andry Barrientos MD at 02/11/23 1434     Cee Brown, RN at 02/11/23 0955        Patient declining Lisinopril, states \" don't want to drop lower\"educated Patient, continues to decline , will notify Dr. Barrientos     Electronically signed by Cee Brown, RN at 02/11/23 1003     Bridget hSelton RN at 02/10/23 2315        Goal Outcome Evaluation:  Plan of Care Reviewed With: patient  Patient Agreement with Plan of Care: agrees        Pt states anxiety 10, depression 10. Pt is calm and cooperative with staff, med compliant.     Electronically signed by Bridget Shelton RN at 02/10/23 2315     Cee Brown RN at 02/10/23 1800        Patient continues to be withdrawn, displaying depressed mood, low energy, remaining in room most of day although encouraged to come to day room .Patient denies suicidal or homicidal ideation    Electronically signed by Cee Brown RN at 02/11/23 1706     Arianna Sheridan MSW at 02/10/23 1605        Goal Outcome Evaluation:           Problem: Adult Inpatient Plan of Care  Goal: Patient-Specific Goal (Individualized)  Outcome: Ongoing, Progressing  Flowsheets  Taken 2/10/2023 1603 by Arianna Sheridan MSW  Anxieties, Fears or Concerns: none verbalized  Taken 2/8/2023 0859 by Wai Velez, RN  Patient-Specific Goals (Include Timeframe): None  Individualized Care Needs: None  Goal: Readiness for Transition of " Care  Outcome: Ongoing, Progressing  Intervention: Mutually Develop Transition Plan  Flowsheets  Taken 2/10/2023 1601 by Arianna Sheridan MSW  Discharge Facility/Level of Care Needs: outpatient therapy  Equipment Needed After Discharge: none  Discharge Coordination/Progress: Patient has insurance. Patient to return home with brother when stable. Scheduled at the [facility] for aftercare.  Equipment Currently Used at Home: none  Anticipated Changes Related to Illness: none  Transportation Anticipated:  • public transportation  • family or friend will provide  Outpatient/Agency/Support Group Needs:  • outpatient psychiatric care  • outpatient therapy  Transportation Concerns: none  Current Discharge Risk: psychiatric illness  Concerns to be Addressed:  • coping/stress  • mental health  • suicidal  Readmission Within the Last 30 Days: no previous admission in last 30 days  Patient/Family Anticipated Services at Transition:  • outpatient care  • mental health services  Patient/Family Anticipates Transition to: home with family  Offered/Gave Vendor List: no  Taken 1/26/2023 1553 by Carol Ann Jin  Patient's Choice of Community Agency(s): To be determined.         DATA: Therapist met with Patient individually on this date. Therapist introduced self as covering therapist and explained that Patient's primary therapist would not be present today. Patient agreeable to discuss treatment progress and discharge concerns.       CLINICAL MANUVERING/INTERVENTIONS: Assisted Patient in processing session content; acknowledged and normalized Patient’s thoughts, feelings, and concerns by utilizing a person-centered approach in efforts to build appropriate rapport and a positive therapeutic relationship with open and honest communication. Allowed Patient to ventilate regarding current stressors and triggers for negative emotions and thoughts in a safe nonjudgmental environment with unconditional positive regard, active listening  skills, and empathy. Therapist implemented motivational interviewing techniques to assist Patient with exploring personal growth and change and discussed distress tolerance skills, self soothing techniques, and applied cognitive behavioral strategies to facilitate identification of maladaptive patterns of thinking and behavior.Therapist utilized dialectical behavior techniques to teach and model emotional regulation and relaxation methods. Therapist assisted Patient with identifying and implementing healthier coping strategies. Therapist assisted Patient with safety planning; Patient agreed to continue honest communication with Treatment Team while inpatient and identify any SI/HI.  Patient encouraged to seek nearest ER or contact 911 if danger to self or others post discharge.     ASSESSMENT:    Patient continues to receive treatment for depression. Patient received ECT treatment today and tolerated well. Patient continues to experience anxiety and depression. Patient to return home with brother when stable. Patient scheduled at the [facility] for aftercare.     PLAN: Patient will continue stabilization. Patient will continue to receive services offered by Treatment Team.     Home when stable with outpatient services via [facility].    Electronically signed by Arianna Sheridan MSW at 02/10/23 1602     Cee Brown, RN at 02/10/23 1035        Notified UR of ECT Scheduled, left message     Electronically signed by Cee Brown, RN at 02/10/23 1039      To prevent performance issues, not all notes are shown.    Go to Chart Review to see the rest of the notes.

## 2023-08-08 ENCOUNTER — OFFICE VISIT (OUTPATIENT)
Dept: PSYCHIATRY | Facility: CLINIC | Age: 53
End: 2023-08-08
Payer: MEDICARE

## 2023-08-08 DIAGNOSIS — Z60.4 SOCIAL ISOLATION: ICD-10-CM

## 2023-08-08 DIAGNOSIS — F33.2 SEVERE EPISODE OF RECURRENT MAJOR DEPRESSIVE DISORDER, WITHOUT PSYCHOTIC FEATURES: Primary | ICD-10-CM

## 2023-08-08 SDOH — SOCIAL STABILITY - SOCIAL INSECURITY: SOCIAL EXCLUSION AND REJECTION: Z60.4

## 2023-08-10 NOTE — PROGRESS NOTES
"Date of Service: August 08, 2023  Time In: 2:50 PM  Time Out: 3:30 PM        IDENTIFYING INFORMATION:   Estephania Lucas  is a 53 y.o. female who is here today for initial appointment.  Patient referred for initial assessment for psychotherapy by her primary care provider.    CHIEF COMPLIANT: \"Depression\"    HPI: Patient reports no psychiatric symptoms prior to 2010 when her father passed away.  She also reports significant grief but states she feels as though she is doing better and has appropriately dealt with the grief of her father.  She also reports approximately 1 year after the death of her father she discovered her  was cheating on her which resulted in a divorce which also contributed to significant increase and symptoms of depression including hopelessness.  Patient reports she began struggling with hopelessness, feeling useless, and having difficulty engaging in life in general.  The patient also reports diminished appetite beginning approximately 6 months ago which led to weight loss from 135 pounds down to 104 pounds.  Patient reports it feels as though this happened relatively quickly.  The patient does state she has been placed on megestrol to assist with her appetite and states she feels as though this is beginning to improve.  Patient states she remarried in 2014 and states things were going very well until August 2019 when she found her  passed away from a massive heart attack.  Patient reports he did not come to bed that night and states she went to check on him and found him dead in his computer chair.  Patient reports this was very difficult and traumatic but denies any symptoms indicative of posttraumatic stress disorder.  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  The patient denies any history of attention deficit disorder, perceptual disturbance, or prolonged periods of danilo.  The patient denies suicidal ideation, intent, or plan at this time.      PAST PSYCHIATRIC HISTORY: Patient was hospitalized at [facility] in January 2023 and after approximately 1 month she was transferred to the [facility] and underwent ECT treatments with Dr. Vergara and according to the patient and the medical record this treatment resulted in minimal improvement.  Patient continues to be under the care of nurse practitioner at Dr. Silva's office in [facility].      SUBSTANCE ABUSE HISTORY: None reported      MEDICAL HISTORY: See medical record      CURRENT MEDICATIONS:  Current Outpatient Medications   Medication Sig Dispense Refill    aspirin 81 MG EC tablet Take 1 tablet by mouth Daily. Indications: CAD 30 tablet 0    clopidogrel (PLAVIX) 75 MG tablet Take 1 tablet by mouth Daily. Indications: CAD 30 tablet 0    megestrol (MEGACE) 40 MG/ML suspension Take 10 mL by mouth Daily. Indications: General Weight Loss and Wasting 480 mL 0    mirtazapine (REMERON) 45 MG tablet Take 1 tablet by mouth Every Night. Indications: Major Depressive Disorder 30 tablet 0    QUEtiapine (SEROquel) 100 MG tablet Take 1 tablet by mouth Every Night. Indications: Major Depressive Disorder 30 tablet 0     No current facility-administered medications for this visit.         FAMILY HISTORY: Patient denies any significant positive family history of substance use or mental illness      SOCIAL HISTORY: Patient reports she grew up in [place] states she had a good childhood with no abuse and no domestic violence.  Patient reports she has 2 adult sons and states she has a good relationship with them although she does not see  them often due to their schedules.  The patient reports she worked in [employer] for many years but states she stopped working in 2011 due to her mental health and receives disability benefits at this time.  Patient reports she spends most of her time at home but states she has been going to work with her sister on a daily basis.  Patient reports very little interaction with others other than her family at this time.  Patient reports she does believe in God but states she is not attending [employer] at this time.    Assessment & Plan   Diagnoses and all orders for this visit:    1. Severe episode of recurrent major depressive disorder, without psychotic features (Primary)    2. Social isolation      Return in about 4 weeks (around 9/5/2023) for Next scheduled follow up.        MENTAL STATUS EXAM:   Hygiene:   good  Cooperation:  Cooperative  Eye Contact:  Fair  Psychomotor Behavior:  Appropriate  Affect:  Restricted  Hopelessness: 1  Speech:  Monotone  Thought Process:  Goal directed  Thought Content:  Normal  Suicidal:  None  Homicidal:  None  Hallucinations:  None  Delusion:  None  Memory:  Intact  Orientation:  Person, Place, and Time  Reliability:  fair  Insight:  Fair  Judgement:  Fair  Impulse Control:  Fair  Physical/Medical Issues:  No     PROBLEM LIST:   Depression    STRENGTHS:   Willingness to seek treatment, positive support from immediate family, stable housing, sense of responsibility to family    WEAKNESSES:   severe depression, poor coping skills, limited support network      SHORT-TERM GOALS: Patient will be compliant with clinic appointments.  Patient will be engaged in therapy, medication compliant with minimal side effects. Patient  will report decrease of symptoms and frequency.  Patient will maintain stability and avoid higher level of care.    LONG-TERM GOALS: Patient will have cessation of symptoms and be able to function at optimal levels without continued treatment.     PLAN:   Patient will  continue in individual outpatient psychotherapy session at [facility] in approximately 4 to 5 weeks and will continue with primary care provider as scheduled and adhere to medication regimen as prescribed.    The patient was instructed to contact the clinic, call 911, or present to the nearest emergency room if crisis occurs.       Luis Roberts LCSW, ALONDRA     This document signed by Luis Roberts LCSW, ALONDRA August 10, 2023 07:06 EDT

## 2023-10-10 ENCOUNTER — OFFICE VISIT (OUTPATIENT)
Dept: PSYCHIATRY | Facility: CLINIC | Age: 53
End: 2023-10-10
Payer: MEDICARE

## 2023-10-10 DIAGNOSIS — Z60.4 SOCIAL ISOLATION: ICD-10-CM

## 2023-10-10 DIAGNOSIS — F33.2 SEVERE EPISODE OF RECURRENT MAJOR DEPRESSIVE DISORDER, WITHOUT PSYCHOTIC FEATURES: Primary | ICD-10-CM

## 2023-10-10 RX ORDER — CITALOPRAM 40 MG/1
40 TABLET ORAL DAILY
COMMUNITY

## 2023-10-10 SDOH — SOCIAL STABILITY - SOCIAL INSECURITY: SOCIAL EXCLUSION AND REJECTION: Z60.4

## 2023-10-13 NOTE — PROGRESS NOTES
Date of Service: October 10, 2023  Time In: 2:00 PM  Time Out: 2:40 PM    IDENTIFYING  INFORMATION:   Estephania Lucas is a 53 y.o. female who met 1:1 with the undersigned for a regularly scheduled individual outpatient therapy session at [facility].    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  However, she admits she spends the vast majority of her free time at home and has very little social interaction.  Patient also reports she continues to struggle with increasing her weight and states she does eat, however, the amounts that she eats are very small.  Patient reports ongoing symptoms of depression including depressed mood, anhedonia, anergia, periods of feeling hopeless and helpless, low self-esteem, and ongoing social isolation.  Patient rates current symptoms at a 5 on a scale of 1-10 with 10 being most severe.  Patient reports she continues to adhere to medication regimen as prescribed.  The patient adamantly convincingly denies suicidal ideation.  The patient vehemently denies any substance use.        Clinical Maneuvering/Intervention: Discussed the therapist/patient relationship and explained the parameters and limitations of relative confidentiality.  Also discussed the importance of regular attendance, active participation, and honesty to the treatment process.  Utilize motivational reviewing techniques including complex reflections to assist the patient in recognizing and acknowledging her progress and praised her efforts.  Utilized cognitive behavioral therapy and motivational reviewing techniques including complex reflections to assist patient in identifying appropriate and concrete steps she can take to bring about positive change including actively seeking activities she can  engage in on a regular basis to reduce idle time and social isolation.  Provided unconditional positive regard in a safe, supportive environment.    Allowed patient to freely discuss issues without interruption or judgment. Provided safe, confidential environment to facilitate the development and maintenance of positive therapeutic relationship. Assisted patient in identifying increased risk factors which would indicate the need for higher level of care including thoughts to harm self or others and/or self-harming behavior and encouraged patient to contact this office, call 911, or present to nearest emergency room should either event occur. Discussed crisis intervention services and means to access.          Assessment: Patient has a long history of mood instability but has had severe depression following various traumatic events within the family including the death of her  in 2019 from cardiac event which the patient found  at their home.  Patient symptomology continues to cause significant impairment in important areas of functioning.  As a result, she can be reasonably expected to benefit from ongoing treatment and would likely be at increased risk for decompensation otherwise.    DIAGNOSIS:   Assessment & Plan   Diagnoses and all orders for this visit:    1. Severe episode of recurrent major depressive disorder, without psychotic features (Primary)    2. Social isolation               Mental Status Exam: Mental Status Exam:   Hygiene:   good  Cooperation:  Cooperative  Eye Contact:  Fair  Psychomotor Behavior:  Slow  Affect:  Appropriate  Speech:  Monotone  Thought Progress:  Linear  Thought Content:  Normal  Suicidal:  None  Homicidal:  None  Hallucinations:  None  Delusion:  None  Memory:  Intact  Orientation:  Person, Place, and Time  Reliability:  fair  Insight:  Fair  Judgement:  Fair  Impulse Control:  Fair        Patient's Support Network Includes: Immediate family     Progress toward  goal: Not at goal     Functional Status: Moderate impairment      Prognosis: Fair with Ongoing Treatment         Plan               Patient will continue in individual outpatient therapy session [facility] every 3 weeks and pharmacotherapy as scheduled with primary care provider.  Patient will adhere to medication regimen as prescribed and report any side effects. Patient will contact this office, call 911 or present to the nearest emergency room should suicidal or homicidal ideations occur. Provide Cognitive Behavioral Therapy and Integrative Therapy to improve functioning, maintain stability, and avoid decompensation and the need for higher level of care.              Return in about 4 weeks (around 11/7/2023) for Next scheduled follow up.        Luis Roberts LCSW     This document signed by Luis Roberts LCSW, [facility] October 13, 2023 05:33 EDT

## 2023-11-14 ENCOUNTER — OFFICE VISIT (OUTPATIENT)
Dept: PSYCHIATRY | Facility: CLINIC | Age: 53
End: 2023-11-14
Payer: MEDICARE

## 2023-11-14 DIAGNOSIS — F33.2 SEVERE EPISODE OF RECURRENT MAJOR DEPRESSIVE DISORDER, WITHOUT PSYCHOTIC FEATURES: Primary | ICD-10-CM

## 2023-11-14 DIAGNOSIS — Z60.4 SOCIAL ISOLATION: ICD-10-CM

## 2023-11-14 RX ORDER — ASPIRIN 81 MG/1
81 TABLET ORAL DAILY
COMMUNITY

## 2023-11-14 RX ORDER — SERTRALINE HYDROCHLORIDE 25 MG/1
25 TABLET, FILM COATED ORAL DAILY
COMMUNITY
Start: 2023-11-14

## 2023-11-14 RX ORDER — MIRTAZAPINE 30 MG/1
30 TABLET, FILM COATED ORAL NIGHTLY
COMMUNITY
Start: 2023-07-01

## 2023-11-14 RX ORDER — CLOPIDOGREL BISULFATE 75 MG/1
75 TABLET ORAL DAILY
COMMUNITY
Start: 2019-05-01

## 2023-11-14 SDOH — SOCIAL STABILITY - SOCIAL INSECURITY: SOCIAL EXCLUSION AND REJECTION: Z60.4

## 2023-11-15 NOTE — PROGRESS NOTES
"Date of Service: November 14, 2023  Time In: 1:30 PM  Time Out: 2:05 PM    IDENTIFYING  INFORMATION:   Estephania Lucas is a 53 y.o. female who met 1:1 with the undersigned for a regularly scheduled individual outpatient therapy session at [facility].    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  Patient does report her primary care provider changed her medication to Zoloft and states she is hopeful it will make an improvement.  However, she suddenly remembers that she is taking it in the past and it caused her to gain a severe amount of weight and although she realizes she needs to gain a small amount of weight she does not want to get \"too big\".  However, she admits she spends the vast majority of her free time at home and has very little social interaction.  Patient also reports she continues to struggle with increasing her weight and states she does eat, however, the amounts that she eats are very small.  Patient reports ongoing symptoms of depression including depressed mood, anhedonia, anergia, periods of feeling hopeless and helpless, low self-esteem, and ongoing social isolation.  Patient rates current symptoms at a 5 on a scale of 1-10 with 10 being most severe.  Patient reports she continues to adhere to medication regimen as prescribed.  The patient adamantly convincingly denies suicidal ideation.  The patient vehemently denies any substance use.        Clinical Maneuvering/Intervention: Encouraged patient to adhere to medication regimen as prescribed and report any side effects to her primary care provider.  Also praised the patient for engaging in traveling with her brother to her niece's home for Thanksgiving to [place] and pointed out the fact the patient is looking forward to this and is also " beginning to discuss the fact that she would like to get her own apartment in the near future.  Utilize motivational reviewing techniques including complex reflections to assist the patient in recognizing and acknowledging her progress and praised her efforts.  Utilized cognitive behavioral therapy and motivational reviewing techniques including complex reflections to assist patient in identifying appropriate and concrete steps she can take to bring about positive change including actively seeking activities she can engage in on a regular basis to reduce idle time and social isolation.  Provided unconditional positive regard in a safe, supportive environment.    Allowed patient to freely discuss issues without interruption or judgment. Provided safe, confidential environment to facilitate the development and maintenance of positive therapeutic relationship. Assisted patient in identifying increased risk factors which would indicate the need for higher level of care including thoughts to harm self or others and/or self-harming behavior and encouraged patient to contact this office, call 911, or present to nearest emergency room should either event occur. Discussed crisis intervention services and means to access.          Assessment: Patient has a long history of mood instability but has had severe depression following various traumatic events within the family including the death of her  in 2019 from cardiac event which the patient found  at their home.  Patient symptomology continues to cause significant impairment in important areas of functioning.  As a result, she can be reasonably expected to benefit from ongoing treatment and would likely be at increased risk for decompensation otherwise.    DIAGNOSIS:   Assessment & Plan   Diagnoses and all orders for this visit:    1. Severe episode of recurrent major depressive disorder, without psychotic features (Primary)    2. Social isolation                Mental Status Exam: Mental Status Exam:   Hygiene:   good  Cooperation:  Cooperative  Eye Contact:  Fair  Psychomotor Behavior:  Slow  Affect:  Appropriate  Speech:  Monotone  Thought Progress:  Linear  Thought Content:  Normal  Suicidal:  None  Homicidal:  None  Hallucinations:  None  Delusion:  None  Memory:  Intact  Orientation:  Person, Place, and Time  Reliability:  fair  Insight:  Fair  Judgement:  Fair  Impulse Control:  Fair        Patient's Support Network Includes: Immediate family     Progress toward goal: Not at goal     Functional Status: Moderate impairment      Prognosis: Fair with Ongoing Treatment         Plan               Patient will continue in individual outpatient therapy session [facility] every 3 weeks and pharmacotherapy as scheduled with primary care provider.  Patient will adhere to medication regimen as prescribed and report any side effects. Patient will contact this office, call 911 or present to the nearest emergency room should suicidal or homicidal ideations occur. Provide Cognitive Behavioral Therapy and Integrative Therapy to improve functioning, maintain stability, and avoid decompensation and the need for higher level of care.              Return in about 4 weeks (around 12/12/2023) for Next scheduled follow up.        Luis Roberts LCSW     This document signed by Luis Roberts LCSW, [facility] November 15, 2023 06:50 EST

## 2024-01-23 ENCOUNTER — OFFICE VISIT (OUTPATIENT)
Dept: PSYCHIATRY | Facility: CLINIC | Age: 54
End: 2024-01-23
Payer: MEDICARE

## 2024-01-23 DIAGNOSIS — F33.2 SEVERE EPISODE OF RECURRENT MAJOR DEPRESSIVE DISORDER, WITHOUT PSYCHOTIC FEATURES: Primary | ICD-10-CM

## 2024-01-23 DIAGNOSIS — Z60.4 SOCIAL ISOLATION: ICD-10-CM

## 2024-01-23 SDOH — SOCIAL STABILITY - SOCIAL INSECURITY: SOCIAL EXCLUSION AND REJECTION: Z60.4

## 2024-05-22 ENCOUNTER — TRANSCRIBE ORDERS (OUTPATIENT)
Dept: ADMINISTRATIVE | Facility: HOSPITAL | Age: 54
End: 2024-05-22
Payer: MEDICARE

## 2024-05-22 DIAGNOSIS — Z12.31 VISIT FOR SCREENING MAMMOGRAM: Primary | ICD-10-CM

## 2024-06-07 ENCOUNTER — HOSPITAL ENCOUNTER (OUTPATIENT)
Facility: HOSPITAL | Age: 54
Discharge: HOME | End: 2024-06-07
Payer: MEDICARE

## 2024-06-07 DIAGNOSIS — Z12.31 VISIT FOR SCREENING MAMMOGRAM: ICD-10-CM

## 2024-06-07 PROCEDURE — 77067 SCR MAMMO BI INCL CAD: CPT

## 2024-06-07 PROCEDURE — 77063 BREAST TOMOSYNTHESIS BI: CPT

## 2024-10-30 ENCOUNTER — OFFICE VISIT (OUTPATIENT)
Dept: SURGERY | Facility: CLINIC | Age: 54
End: 2024-10-30
Payer: MEDICARE

## 2024-10-30 VITALS
WEIGHT: 108 LBS | SYSTOLIC BLOOD PRESSURE: 124 MMHG | DIASTOLIC BLOOD PRESSURE: 70 MMHG | HEIGHT: 66 IN | BODY MASS INDEX: 17.36 KG/M2

## 2024-10-30 DIAGNOSIS — Z12.11 COLON CANCER SCREENING: Primary | ICD-10-CM

## 2024-10-30 RX ORDER — SODIUM, POTASSIUM,MAG SULFATES 17.5-3.13G
1 SOLUTION, RECONSTITUTED, ORAL ORAL EVERY 12 HOURS
Qty: 354 ML | Refills: 0 | Status: SHIPPED | OUTPATIENT
Start: 2024-10-30

## 2024-10-30 NOTE — PROGRESS NOTES
Subjective   Estephania Lucas is a 54 y.o. female is being seen for consultation today at the request of Steffanie Quinones PA    Estephania Lucas is a 54 y.o. female with history of an uncle with colon cancer who needs screening colonoscopy performed.  No change in bowel habits stool caliber.  No blood in her stools.  No unintentional weight loss.  No previous colonoscopy.  She has a history of coronary artery stenting and is on aspirin and Plavix.  No recent cardiology follow-up noted.    History of Present Illness      Past Medical History:   Diagnosis Date    Anxiety     Coronary artery disease     Depression     GERD (gastroesophageal reflux disease)     Hypertension     MI (myocardial infarction)     PONV (postoperative nausea and vomiting)     Sleep apnea     Substance abuse        Family History   Problem Relation Age of Onset    Hypertension Other     Breast cancer Neg Hx        Social History     Socioeconomic History    Marital status:    Tobacco Use    Smoking status: Former     Current packs/day: 1.00     Average packs/day: 1 pack/day for 25.0 years (25.0 ttl pk-yrs)     Types: Cigarettes    Smokeless tobacco: Never   Vaping Use    Vaping status: Never Used   Substance and Sexual Activity    Alcohol use: Not Currently    Drug use: Yes     Types: Marijuana, Amphetamines, Other     Comment: Last use of suboxone 22    Sexual activity: Defer       Past Surgical History:   Procedure Laterality Date    CARDIAC CATHETERIZATION       SECTION      CORONARY STENT PLACEMENT      ELECTROCONVULSIVE THERAPY Bilateral 2023    Procedure: ELECTROCONVULSIVE THERAPY;  Surgeon: Gautam Vergara MD;  Location: [facility];  Service: ECT;  Laterality: Bilateral;    ELECTROCONVULSIVE THERAPY Bilateral 2/3/2023    Procedure: ELECTROCONVULSIVE THERAPY;  Surgeon: Gautam Vergara MD;  Location: [facility];  Service: ECT;  Laterality: Bilateral;    ELECTROCONVULSIVE THERAPY N/A 2023     "Procedure: ELECTROCONVULSIVE THERAPY;  Surgeon: Gautam Vergara MD;  Location: [facility] OR;  Service: ECT;  Laterality: N/A;  42 seconds    ELECTROCONVULSIVE THERAPY N/A 2/8/2023    Procedure: ELECTROCONVULSIVE THERAPY;  Surgeon: Gautam Vergara MD;  Location: [facility] OR;  Service: ECT;  Laterality: N/A;  23 seconds    ELECTROCONVULSIVE THERAPY N/A 2/10/2023    Procedure: ELECTROCONVULSIVE THERAPY;  Surgeon: Gautam Vergara MD;  Location: [facility] OR;  Service: ECT;  Laterality: N/A;  25 seconds       Review of Systems   Constitutional:  Negative for activity change, appetite change, chills and fever.   HENT:  Negative for sore throat and trouble swallowing.    Eyes:  Negative for visual disturbance.   Respiratory:  Negative for cough and shortness of breath.    Cardiovascular:  Negative for chest pain and palpitations.   Gastrointestinal:  Negative for abdominal distention, abdominal pain, blood in stool, constipation, diarrhea, nausea and vomiting.   Endocrine: Negative for cold intolerance and heat intolerance.   Genitourinary:  Negative for dysuria.   Musculoskeletal:  Negative for joint swelling.   Skin:  Negative for color change, rash and wound.   Allergic/Immunologic: Negative for immunocompromised state.   Neurological:  Negative for dizziness, seizures, weakness and headaches.   Hematological:  Negative for adenopathy. Does not bruise/bleed easily.   Psychiatric/Behavioral:  Negative for agitation and confusion.        Results        /70 (BP Location: Left arm, Patient Position: Sitting, Cuff Size: Adult)   Ht 167.6 cm (65.98\")   Wt 49 kg (108 lb)   BMI 17.44 kg/m²   Objective   Physical Exam  Constitutional:       Appearance: She is well-developed.   HENT:      Head: Normocephalic and atraumatic.   Eyes:      Conjunctiva/sclera: Conjunctivae normal.      Pupils: Pupils are equal, round, and reactive to light.   Neck:      Thyroid: No thyromegaly.      Vascular: No JVD.    "   Trachea: No tracheal deviation.   Cardiovascular:      Rate and Rhythm: Normal rate and regular rhythm.      Heart sounds: No murmur heard.     No friction rub. No gallop.   Pulmonary:      Effort: Pulmonary effort is normal.      Breath sounds: Normal breath sounds.   Abdominal:      General: There is no distension.      Palpations: Abdomen is soft. There is no hepatomegaly or splenomegaly.      Tenderness: There is no abdominal tenderness.      Hernia: No hernia is present.   Musculoskeletal:         General: No deformity. Normal range of motion.      Cervical back: Neck supple.   Skin:     General: Skin is warm and dry.   Neurological:      Mental Status: She is alert and oriented to person, place, and time.       Physical Exam      Assessment & Plan            Assessment   Diagnoses and all orders for this visit:    1. Colon cancer screening (Primary)  -     Case Request; Standing  -     Case Request    Other orders  -     Follow Anesthesia Guidelines / Protocol; Future  -     Follow Anesthesia Guidelines / Protocol; Standing  -     Verify / Perform Chlorhexidine Skin Prep; Standing  -     Provide NPO Instructions to Patient; Future  -     Chlorhexidine Skin Prep; Future  -     Place Sequential Compression Device; Standing  -     Maintain Sequential Compression Device; Standing  -     sodium-potassium-magnesium sulfates (Suprep Bowel Prep Kit) 17.5-3.13-1.6 GM/177ML solution oral solution; Take 1 bottle by mouth Every 12 (Twelve) Hours.  Dispense: 354 mL; Refill: 0        Assessment & Plan      Estephania Lucas is a 54 y.o. female with need for screening colonoscopy.  The patient is scheduled for early December and in the interim we will obtain cardiac clearance.  Once cardiology clearance is performed we will proceed with scheduled colonoscopy.    BMI is below normal parameters (malnutrition). Recommendations: none (medical contraindication)            This document has been electronically signed by Zuhair Ellington  MD Rush   October 30, 2024 09:52 EDT    Patient or patient representative verbalized consent for the use of Ambient Listening during the visit with  Zuhair Beverly MD for chart documentation. 10/30/2024  09:53 EDT

## 2024-10-31 DIAGNOSIS — Z01.810 ENCOUNTER FOR PRE-OPERATIVE CARDIOVASCULAR CLEARANCE: Primary | ICD-10-CM

## 2024-11-06 ENCOUNTER — OFFICE VISIT (OUTPATIENT)
Dept: CARDIOLOGY | Facility: CLINIC | Age: 54
End: 2024-11-06
Payer: MEDICARE

## 2024-11-06 VITALS
HEIGHT: 66 IN | OXYGEN SATURATION: 98 % | HEART RATE: 77 BPM | WEIGHT: 107.6 LBS | SYSTOLIC BLOOD PRESSURE: 131 MMHG | BODY MASS INDEX: 17.29 KG/M2 | RESPIRATION RATE: 16 BRPM | DIASTOLIC BLOOD PRESSURE: 86 MMHG

## 2024-11-06 DIAGNOSIS — F17.200 SMOKING: ICD-10-CM

## 2024-11-06 DIAGNOSIS — Z82.49 FAMILY HISTORY OF CORONARY ARTERY DISEASE: ICD-10-CM

## 2024-11-06 DIAGNOSIS — I25.10 CORONARY ARTERY DISEASE INVOLVING NATIVE CORONARY ARTERY OF NATIVE HEART WITHOUT ANGINA PECTORIS: Primary | ICD-10-CM

## 2024-11-06 DIAGNOSIS — R06.09 DOE (DYSPNEA ON EXERTION): ICD-10-CM

## 2024-11-06 RX ORDER — PAROXETINE 30 MG/1
30 TABLET, FILM COATED ORAL EVERY MORNING
COMMUNITY

## 2024-11-06 RX ORDER — CYPROHEPTADINE HYDROCHLORIDE 4 MG/1
4 TABLET ORAL 2 TIMES DAILY
COMMUNITY

## 2024-11-06 RX ORDER — ATORVASTATIN CALCIUM 10 MG/1
10 TABLET, FILM COATED ORAL DAILY
Qty: 30 TABLET | Refills: 11 | Status: SHIPPED | OUTPATIENT
Start: 2024-11-06

## 2024-11-06 NOTE — PROGRESS NOTES
Steffanie Quinones PA  No ref. provider found    Estephania Lucas  1970  11/06/2024    Subjective     Estephania Lucas is a 54 y.o. female who presents today to [facility] for Surgical Clearance (colonoscopy), Coronary Artery Disease (Stent x1 s/p MI 2018, [facility]), and Med Management (presented).    History of Present Illness:  54-year-old female with history of myocardial infarction status post PCI back in 2018 at [facility], hypertension, smoking comes in today to establish care and for preop evaluation for colonoscopy.    Patient reports that she has significant family history of coronary artery disease.  Her mother had a stroke in her 40s and her brother had 2 PCI's done because of coronary artery disease in his 30s.  Patient reports that she is going for a routine colonoscopy. At this time she is able to go up 2 flights of stairs without any chest pain she denies having any exertional chest pain whatsoever. Her only symptom right now is shortness of breath with exertion. She seems to attribute the dyspnea on exertion with her history of smoking.  She has never seen a pulmonologist or had evaluation for COPD.  She is an active smoker and still continues to smoke.       No Known Allergies:    Current Outpatient Medications:     clopidogrel (PLAVIX) 75 MG tablet, Take 1 tablet by mouth Daily. Indications: Cerebrovascular Accident or Stroke, Disp: , Rfl:     mirtazapine (REMERON SOL-TAB) 30 MG disintegrating tablet, Place 1 tablet on the tongue Every Night. Indications: Major Depressive Disorder (Patient taking differently: Place 15 mg on the tongue Every Night. Indications: Major Depressive Disorder), Disp: , Rfl:     atorvastatin (LIPITOR) 10 MG tablet, Take 1 tablet by mouth Daily., Disp: 30 tablet, Rfl: 11    cyproheptadine (PERIACTIN) 4 MG tablet, Take 1 tablet by mouth 2 (Two) Times a Day., Disp: , Rfl:     PARoxetine (PAXIL) 30 MG tablet, Take 1 tablet  by mouth Every Morning., Disp: , Rfl:     sertraline (ZOLOFT) 25 MG tablet, Take 1 tablet by mouth Daily. Indications: Major Depressive Disorder (Patient not taking: Reported on 2024), Disp: , Rfl:     sodium-potassium-magnesium sulfates (Suprep Bowel Prep Kit) 17.5-3.13-1.6 GM/177ML solution oral solution, Take 1 bottle by mouth Every 12 (Twelve) Hours., Disp: 354 mL, Rfl: 0    Past Medical History:   Diagnosis Date    Anxiety     Coronary artery disease     Depression     GERD (gastroesophageal reflux disease)     Hypertension     MI (myocardial infarction)     PONV (postoperative nausea and vomiting)     Sleep apnea     Substance abuse      Past Surgical History:   Procedure Laterality Date    CARDIAC CATHETERIZATION       SECTION      CORONARY STENT PLACEMENT      ELECTROCONVULSIVE THERAPY Bilateral 2023    Procedure: ELECTROCONVULSIVE THERAPY;  Surgeon: Gautam Vergara MD;  Location: [facility] OR;  Service: ECT;  Laterality: Bilateral;    ELECTROCONVULSIVE THERAPY Bilateral 2/3/2023    Procedure: ELECTROCONVULSIVE THERAPY;  Surgeon: Gautam Vergara MD;  Location: [facility] OR;  Service: ECT;  Laterality: Bilateral;    ELECTROCONVULSIVE THERAPY N/A 2023    Procedure: ELECTROCONVULSIVE THERAPY;  Surgeon: Gautam Vergara MD;  [facility];  Service: ECT;  Laterality: N/A;  42 seconds    ELECTROCONVULSIVE THERAPY N/A 2023    Procedure: ELECTROCONVULSIVE THERAPY;  Surgeon: Gautam Vergara MD;  Location: [facility] OR;  Service: ECT;  Laterality: N/A;  23 seconds    ELECTROCONVULSIVE THERAPY N/A 2/10/2023    Procedure: ELECTROCONVULSIVE THERAPY;  Surgeon: Gautam Vergara MD;  Location: [facility] OR;  Service: ECT;  Laterality: N/A;  25 seconds     Family History   Problem Relation Age of Onset    Heart disease Mother     Heart disease Brother     Hypertension Other     Breast cancer Neg Hx      Social History     Tobacco Use    Smoking status:  "Former     Current packs/day: 1.00     Average packs/day: 1 pack/day for 25.0 years (25.0 ttl pk-yrs)     Types: Cigarettes    Smokeless tobacco: Never   Vaping Use    Vaping status: Never Used   Substance Use Topics    Alcohol use: Not Currently    Drug use: Yes     Types: Marijuana, Amphetamines, Other     Comment: Last use of suboxone 12/12/22       Objective   Blood pressure 131/86, pulse 77, resp. rate 16, height 167.6 cm (66\"), weight 48.8 kg (107 lb 9.6 oz), SpO2 98%.  Body mass index is 17.37 kg/m².    Constitutional:       Appearance: Healthy appearance.   Pulmonary:      Breath sounds: Normal breath sounds.   Cardiovascular:      Normal rate. Regular rhythm. Normal S1. Normal S2.       Murmurs: There is no murmur.   Edema:     Peripheral edema absent.   Skin:     General: Skin is warm.   Neurological:      General: No focal deficit present.           DATA:       Results for orders placed during the hospital encounter of 06/28/23    US Abdomen Complete    Narrative  EXAM:  US Abdomen Complete    EXAM DATE:  6/28/2023 8:20 AM    CLINICAL HISTORY:  Right upper quadrant pain; R10.11-Right upper quadrant pain    TECHNIQUE:  Real-time ultrasound of the abdomen with image documentation.    COMPARISON:  No relevant prior studies available.    FINDINGS:  Liver:  Unremarkable.  No mass.  No intrahepatic bile duct dilation.  Gallbladder:  Unremarkable.  No gallstones.  Common bile duct:  The CBD measures 3 mm  .  No stones.  No dilation.  Pancreas:  Unremarkable as visualized.  Kidneys:  Right kidney is 9.8 x 3.1 cm.  Left kidney is 8.8 x 4.5 cm.  No stones.  No hydronephrosis.  Spleen:  Spleen is 9.0 cm.  Aorta:  Unremarkable.  No aneurysm.  Inferior vena cava:  Unremarkable.    Impression  Unremarkable exam.    This report was finalized on 6/28/2023 8:49 AM by Dr. Yayo Valles MD.      No results found for: \"BNP\"  No results found for: \"PSA\"   Lab Results   Component Value Date    MG 1.8 01/26/2023     No results " "found for: \"INR\"  No results found for: \"CKTOTAL\"  No results found for: \"CHOL\", \"CHLPL\"  No results found for: \"TRIG\"  No results found for: \"HDL\"  No results found for: \"LDL\", \"LDLDIRECT\"  No components found for: \"A1C\"      Lab Results   Component Value Date    TSH 1.550 01/26/2023             Invalid input(s): \"LABALBU\", \"PROT\"        Results review: During today's encounter, all relevant clinical data has been reviewed.        ECG 12 Lead    Date/Time: 11/6/2024 11:30 AM  Performed by: Saranya Shin MD    Authorized by: Saranya Shin MD  Rhythm: sinus rhythm  Rate: normal  QRS axis: normal  Other findings: right atrial abnormality    Clinical impression: non-specific ECG          Assessment & Plan    Diagnosis Plan   1. Coronary artery disease involving native coronary artery of native heart without angina pectoris  Stress Test With Myocardial Perfusion One Day    Adult Transthoracic Echo Complete W/ Cont if Necessary Per Protocol    atorvastatin (LIPITOR) 10 MG tablet    ECG 12 Lead    PCI x 1 in 2018 after an acute MI      2. Family history of coronary artery disease        3. Smoking  Stress Test With Myocardial Perfusion One Day    Adult Transthoracic Echo Complete W/ Cont if Necessary Per Protocol    Ambulatory Referral to Pulmonology      4. CROW (dyspnea on exertion)  Stress Test With Myocardial Perfusion One Day    Adult Transthoracic Echo Complete W/ Cont if Necessary Per Protocol    Ambulatory Referral to Pulmonology          Recommendations  Orders Placed This Encounter   Procedures    Ambulatory Referral to Pulmonology    Stress Test With Myocardial Perfusion One Day    ECG 12 Lead    Adult Transthoracic Echo Complete W/ Cont if Necessary Per Protocol        Given the fact the patient has dyspnea on exertion, we will get further evaluation by doing nuclear stress test and an echocardiogram. Once the echocardiogram and the nuclear stress test is done with reevaluate the patient for her preop " assessment.  Patient at this time is not optimized from cardiovascular standpoint for colonoscopy.    With her history of acute MI status post PCI back in 2018, will stop the aspirin and continue Plavix only.  Will start patient on Lipitor 10 mg p.o. once daily for ASCVD risk reduction.  She reports that she was on Lipitor in the past and tolerated well.  She denies having any history of side effects to Lipitor.   The patient was educated on the harms of smoking and the importance of quitting. We discussed nicotine replacement strategies as well pharmacotherapies as aides to help with quitting, and the higher rates of success of smoking cessation with these aides.  Given patient history of long-term smoking and dyspnea on exertion.  There is a concern for COPD.  Will refer patient to pulmonology.       New Medications:   New Medications Ordered This Visit   Medications    atorvastatin (LIPITOR) 10 MG tablet     Sig: Take 1 tablet by mouth Daily.     Dispense:  30 tablet     Refill:  11       Discontinued Medications:   Medications Discontinued During This Encounter   Medication Reason    aspirin 81 MG EC tablet *Therapy completed        Return in about 8 weeks (around 1/1/2025).      Thank you for allowing me to participate in the care of Estephania Solorzanoey. Feel free to contact me directly with any further questions or concerns.      This document has been electronically signed by Saranya Shin MD   November 6, 2024 11:46 EST    Dictated Utilizing Dragon Dictation: Part of this note may be an electronic transcription/translation of spoken language to printed text using the Dragon Dictation System.

## 2024-12-06 ENCOUNTER — OFFICE VISIT (OUTPATIENT)
Dept: PULMONOLOGY | Facility: CLINIC | Age: 54
End: 2024-12-06
Payer: MEDICARE

## 2024-12-06 VITALS
DIASTOLIC BLOOD PRESSURE: 68 MMHG | SYSTOLIC BLOOD PRESSURE: 118 MMHG | TEMPERATURE: 97.9 F | HEART RATE: 78 BPM | OXYGEN SATURATION: 96 % | BODY MASS INDEX: 17.19 KG/M2 | WEIGHT: 107 LBS | HEIGHT: 66 IN

## 2024-12-06 DIAGNOSIS — Z12.2 ENCOUNTER FOR SCREENING FOR LUNG CANCER: ICD-10-CM

## 2024-12-06 DIAGNOSIS — R06.02 SHORTNESS OF BREATH: Primary | ICD-10-CM

## 2024-12-06 DIAGNOSIS — F17.210 CIGARETTE NICOTINE DEPENDENCE WITHOUT COMPLICATION: ICD-10-CM

## 2024-12-06 PROCEDURE — 1159F MED LIST DOCD IN RCRD: CPT | Performed by: NURSE PRACTITIONER

## 2024-12-06 PROCEDURE — 99204 OFFICE O/P NEW MOD 45 MIN: CPT | Performed by: NURSE PRACTITIONER

## 2024-12-06 PROCEDURE — 1160F RVW MEDS BY RX/DR IN RCRD: CPT | Performed by: NURSE PRACTITIONER

## 2024-12-06 PROCEDURE — G2211 COMPLEX E/M VISIT ADD ON: HCPCS | Performed by: NURSE PRACTITIONER

## 2024-12-06 PROCEDURE — 3078F DIAST BP <80 MM HG: CPT | Performed by: NURSE PRACTITIONER

## 2024-12-06 PROCEDURE — 3074F SYST BP LT 130 MM HG: CPT | Performed by: NURSE PRACTITIONER

## 2024-12-06 RX ORDER — ALBUTEROL SULFATE 90 UG/1
2 INHALANT RESPIRATORY (INHALATION) EVERY 4 HOURS PRN
Qty: 18 G | Refills: 5 | Status: SHIPPED | OUTPATIENT
Start: 2024-12-06

## 2024-12-06 NOTE — PROGRESS NOTES
"Chief Complaint  Shortness of Breath    Subjective          Estephania Lucas presents to [facility] for   History of Present Illness    Ms. Lucas is a 54 year old female with a medical history significant for anxiety, CAD, depression, GERD, hypertension, MI, and sleep apnea.    She presents today for evaluation of shortness of breath.  She reports that she has an intermittent productive cough and shortness of breath with exertion.  She reports that her symptoms started a few years ago.  She is a current smoker of about 4-5 cigarettes for about 30ish years.  She reports that she has cut back a lot.        Objective   Vital Signs:   /68   Pulse 78   Temp 97.9 °F (36.6 °C)   Ht 167.6 cm (65.98\")   Wt 48.5 kg (107 lb)   SpO2 96%   BMI 17.28 kg/m²         Physical Exam    GENERAL APPEARANCE: Well developed, well nourished, alert and cooperative, and appears to be in no acute distress.    HEAD: normocephalic. Atraumatic.    EYES: PERRL, EOMI. Vision is grossly intact.    THROAT: Oral cavity and pharynx normal. No inflammation, swelling, exudate, or lesions.     NECK: Neck supple.  No thyromegaly.    CARDIAC: Normal S1 and S2. No S3, S4 or murmurs. Rhythm is regular.     RESPIRATORY:Bilateral air entry positive.  No wheezing, crackles or rhonchi noted.    GI: Positive bowel sounds. Soft, nondistended, nontender.     MUSCULOSKELETAL: No significant deformity or joint abnormality. No edema. Peripheral pulses intact. No varicosities.    NEUROLOGICAL: Strength and sensation symmetric and intact throughout.     PSYCHIATRIC: The mental examination revealed the patient was oriented to person, place, and time.       Estimated body mass index is 17.28 kg/m² as calculated from the following:    Height as of this encounter: 167.6 cm (65.98\").    Weight as of this encounter: 48.5 kg (107 lb).        Result Review :   The following data was reviewed by: Rosmery Watt, " "APRN on 12/06/2024:         PFT:NA    Low dose lung cancer screening:NA    Previous chest imaging:NA    Alpha-1 antitrypsin screening:NA    STOP-Bang Score:   NA  Elmer Sleepiness Scale:   NA      ABG:    pH No results found for: \"PHART\"   pO2 No results found for: \"PO2ART\"   pCO2 No results found for: \"ITF7VWR\"   HCO3 No results found for: \"QTW4QHA\"                      Assessment and Plan    Problem List Items Addressed This Visit    None  Visit Diagnoses       Shortness of breath    -  Primary    Relevant Orders    Complete PFT - Pre & Post Bronchodilator    Cigarette nicotine dependence without complication        Relevant Orders    CT chest low dose wo    Encounter for screening for lung cancer        Relevant Orders    CT chest low dose wo            Estephania Lucas  reports that she has been smoking cigarettes. She has a 25 pack-year smoking history. She has never used smokeless tobacco. I have educated her on the risk of diseases from using tobacco products such as cancer, COPD, and heart disease.     I advised her to quit and she is states that she has been trying to cut back but has not yet set a quit date.     Ordered a PFT to assess lung function.    Will start her on albuterol inhaler as needed.    Will prescribe other inhalers based on PFT results.    Ordered low dose CT chest for lung cancer screening.        Follow Up   Return in about 3 months (around 3/6/2025).  Patient was given instructions and counseling regarding her condition or for health maintenance advice. Please see specific information pulled into the AVS if appropriate.        "

## 2024-12-13 ENCOUNTER — HOSPITAL ENCOUNTER (OUTPATIENT)
Dept: CARDIOLOGY | Facility: HOSPITAL | Age: 54
Discharge: HOME | End: 2024-12-13
Payer: MEDICARE

## 2024-12-13 ENCOUNTER — HOSPITAL ENCOUNTER (OUTPATIENT)
Dept: NUCLEAR MEDICINE | Facility: HOSPITAL | Age: 54
Discharge: HOME | End: 2024-12-13
Payer: MEDICARE

## 2024-12-13 DIAGNOSIS — R06.09 DOE (DYSPNEA ON EXERTION): ICD-10-CM

## 2024-12-13 DIAGNOSIS — I25.10 CORONARY ARTERY DISEASE INVOLVING NATIVE CORONARY ARTERY OF NATIVE HEART WITHOUT ANGINA PECTORIS: ICD-10-CM

## 2024-12-13 DIAGNOSIS — F17.200 SMOKING: ICD-10-CM

## 2024-12-13 LAB
BH CV ECHO MEAS - ACS: 1.7 CM
BH CV ECHO MEAS - AO MAX PG: 3.5 MMHG
BH CV ECHO MEAS - AO MEAN PG: 2 MMHG
BH CV ECHO MEAS - AO ROOT DIAM: 3.3 CM
BH CV ECHO MEAS - AO V2 MAX: 93.3 CM/SEC
BH CV ECHO MEAS - AO V2 VTI: 19.4 CM
BH CV ECHO MEAS - AVA(I,D): 2.6 CM2
BH CV ECHO MEAS - EDV(CUBED): 46.7 ML
BH CV ECHO MEAS - EDV(MOD-SP2): 44.8 ML
BH CV ECHO MEAS - EDV(MOD-SP4): 60.5 ML
BH CV ECHO MEAS - EF(MOD-BP): 63.3 %
BH CV ECHO MEAS - EF(MOD-SP2): 61.4 %
BH CV ECHO MEAS - EF(MOD-SP4): 63.1 %
BH CV ECHO MEAS - ESV(CUBED): 13.8 ML
BH CV ECHO MEAS - ESV(MOD-SP2): 17.3 ML
BH CV ECHO MEAS - ESV(MOD-SP4): 22.3 ML
BH CV ECHO MEAS - FS: 33.3 %
BH CV ECHO MEAS - IVS/LVPW: 0.88 CM
BH CV ECHO MEAS - IVSD: 0.7 CM
BH CV ECHO MEAS - LA DIMENSION: 1.9 CM
BH CV ECHO MEAS - LAT PEAK E' VEL: 11 CM/SEC
BH CV ECHO MEAS - LV DIASTOLIC VOL/BSA (35-75): 39.5 CM2
BH CV ECHO MEAS - LV MASS(C)D: 72.1 GRAMS
BH CV ECHO MEAS - LV MAX PG: 2.6 MMHG
BH CV ECHO MEAS - LV MEAN PG: 1 MMHG
BH CV ECHO MEAS - LV SYSTOLIC VOL/BSA (12-30): 14.5 CM2
BH CV ECHO MEAS - LV V1 MAX: 80.9 CM/SEC
BH CV ECHO MEAS - LV V1 VTI: 16.3 CM
BH CV ECHO MEAS - LVIDD: 3.6 CM
BH CV ECHO MEAS - LVIDS: 2.4 CM
BH CV ECHO MEAS - LVOT AREA: 3.1 CM2
BH CV ECHO MEAS - LVOT DIAM: 2 CM
BH CV ECHO MEAS - LVPWD: 0.8 CM
BH CV ECHO MEAS - MED PEAK E' VEL: 9 CM/SEC
BH CV ECHO MEAS - MR MAX PG: 20.6 MMHG
BH CV ECHO MEAS - MR MAX VEL: 227 CM/SEC
BH CV ECHO MEAS - MV A MAX VEL: 92.1 CM/SEC
BH CV ECHO MEAS - MV DEC SLOPE: 315 CM/SEC2
BH CV ECHO MEAS - MV DEC TIME: 0.25 SEC
BH CV ECHO MEAS - MV E MAX VEL: 78.4 CM/SEC
BH CV ECHO MEAS - MV E/A: 0.85
BH CV ECHO MEAS - MV MAX PG: 3.9 MMHG
BH CV ECHO MEAS - MV MEAN PG: 2 MMHG
BH CV ECHO MEAS - MV V2 VTI: 20.2 CM
BH CV ECHO MEAS - MVA(VTI): 2.5 CM2
BH CV ECHO MEAS - PA ACC TIME: 0.17 SEC
BH CV ECHO MEAS - PA V2 MAX: 90.4 CM/SEC
BH CV ECHO MEAS - RAP SYSTOLE: 10 MMHG
BH CV ECHO MEAS - RVSP: 21.7 MMHG
BH CV ECHO MEAS - SV(LVOT): 51.2 ML
BH CV ECHO MEAS - SV(MOD-SP2): 27.5 ML
BH CV ECHO MEAS - SV(MOD-SP4): 38.2 ML
BH CV ECHO MEAS - SVI(LVOT): 33.4 ML/M2
BH CV ECHO MEAS - SVI(MOD-SP2): 17.9 ML/M2
BH CV ECHO MEAS - SVI(MOD-SP4): 24.9 ML/M2
BH CV ECHO MEAS - TAPSE (>1.6): 2.32 CM
BH CV ECHO MEAS - TR MAX PG: 11.7 MMHG
BH CV ECHO MEAS - TR MAX VEL: 171 CM/SEC
BH CV ECHO MEASUREMENTS AVERAGE E/E' RATIO: 7.84
BH CV NUCLEAR PRIOR STUDY: 3
BH CV REST NUCLEAR ISOTOPE DOSE: 10.4 MCI
BH CV STRESS BP STAGE 1: NORMAL
BH CV STRESS DURATION MIN STAGE 1: 3
BH CV STRESS DURATION MIN STAGE 2: 3
BH CV STRESS DURATION MIN STAGE 3: 1
BH CV STRESS DURATION SEC STAGE 1: 0
BH CV STRESS DURATION SEC STAGE 2: 0
BH CV STRESS DURATION SEC STAGE 3: 9
BH CV STRESS GRADE STAGE 1: 10
BH CV STRESS GRADE STAGE 2: 12
BH CV STRESS GRADE STAGE 3: 14
BH CV STRESS HR STAGE 1: 116
BH CV STRESS HR STAGE 2: 134
BH CV STRESS HR STAGE 3: 146
BH CV STRESS METS STAGE 1: 5
BH CV STRESS METS STAGE 2: 7.5
BH CV STRESS METS STAGE 3: 10
BH CV STRESS NUCLEAR ISOTOPE DOSE: 30.1 MCI
BH CV STRESS PROTOCOL 1: NORMAL
BH CV STRESS RECOVERY BP: NORMAL MMHG
BH CV STRESS RECOVERY HR: 85 BPM
BH CV STRESS SPEED STAGE 1: 1.7
BH CV STRESS SPEED STAGE 2: 2.5
BH CV STRESS SPEED STAGE 3: 3.4
BH CV STRESS STAGE 1: 1
BH CV STRESS STAGE 2: 2
BH CV STRESS STAGE 3: 3
MAXIMAL PREDICTED HEART RATE: 167 BPM
PERCENT MAX PREDICTED HR: 87.43 %
STRESS BASELINE BP: NORMAL MMHG
STRESS BASELINE HR: 85 BPM
STRESS PERCENT HR: 103 %
STRESS POST ESTIMATED WORKLOAD: 10.1 METS
STRESS POST EXERCISE DUR MIN: 7 MIN
STRESS POST EXERCISE DUR SEC: 9 SEC
STRESS POST PEAK BP: NORMAL MMHG
STRESS POST PEAK HR: 146 BPM
STRESS TARGET HR: 142 BPM

## 2024-12-13 PROCEDURE — A9500 TC99M SESTAMIBI: HCPCS | Performed by: INTERNAL MEDICINE

## 2024-12-13 PROCEDURE — 93017 CV STRESS TEST TRACING ONLY: CPT

## 2024-12-13 PROCEDURE — 34310000005 TECHNETIUM SESTAMIBI: Performed by: INTERNAL MEDICINE

## 2024-12-13 PROCEDURE — 93306 TTE W/DOPPLER COMPLETE: CPT

## 2024-12-13 PROCEDURE — 78452 HT MUSCLE IMAGE SPECT MULT: CPT

## 2024-12-13 RX ADMIN — TECHNETIUM TC 99M SESTAMIBI 1 DOSE: 1 INJECTION INTRAVENOUS at 07:40

## 2024-12-13 RX ADMIN — TECHNETIUM TC 99M SESTAMIBI 1 DOSE: 1 INJECTION INTRAVENOUS at 08:52

## 2024-12-18 LAB
BH CV NUCLEAR PRIOR STUDY: 3
BH CV REST NUCLEAR ISOTOPE DOSE: 10.4 MCI
BH CV STRESS BP STAGE 1: NORMAL
BH CV STRESS DURATION MIN STAGE 1: 3
BH CV STRESS DURATION MIN STAGE 2: 3
BH CV STRESS DURATION MIN STAGE 3: 1
BH CV STRESS DURATION SEC STAGE 1: 0
BH CV STRESS DURATION SEC STAGE 2: 0
BH CV STRESS DURATION SEC STAGE 3: 9
BH CV STRESS GRADE STAGE 1: 10
BH CV STRESS GRADE STAGE 2: 12
BH CV STRESS GRADE STAGE 3: 14
BH CV STRESS HR STAGE 1: 116
BH CV STRESS HR STAGE 2: 134
BH CV STRESS HR STAGE 3: 146
BH CV STRESS METS STAGE 1: 5
BH CV STRESS METS STAGE 2: 7.5
BH CV STRESS METS STAGE 3: 10
BH CV STRESS NUCLEAR ISOTOPE DOSE: 30.1 MCI
BH CV STRESS PROTOCOL 1: NORMAL
BH CV STRESS RECOVERY BP: NORMAL MMHG
BH CV STRESS RECOVERY HR: 85 BPM
BH CV STRESS SPEED STAGE 1: 1.7
BH CV STRESS SPEED STAGE 2: 2.5
BH CV STRESS SPEED STAGE 3: 3.4
BH CV STRESS STAGE 1: 1
BH CV STRESS STAGE 2: 2
BH CV STRESS STAGE 3: 3
LV EF NUC BP: 80 %
MAXIMAL PREDICTED HEART RATE: 167 BPM
PERCENT MAX PREDICTED HR: 87.43 %
STRESS BASELINE BP: NORMAL MMHG
STRESS BASELINE HR: 85 BPM
STRESS PERCENT HR: 103 %
STRESS POST ESTIMATED WORKLOAD: 10.1 METS
STRESS POST EXERCISE DUR MIN: 7 MIN
STRESS POST EXERCISE DUR SEC: 9 SEC
STRESS POST PEAK BP: NORMAL MMHG
STRESS POST PEAK HR: 146 BPM
STRESS TARGET HR: 142 BPM

## 2025-01-06 ENCOUNTER — TELEPHONE (OUTPATIENT)
Dept: CARDIOLOGY | Facility: CLINIC | Age: 55
End: 2025-01-06
Payer: MEDICARE

## 2025-01-06 NOTE — TELEPHONE ENCOUNTER
Hub to relay.    Called pt to advise that  has signed off on her clearance. She will need to hold her plavix for 7 days and restart when the surgeons advises her. No answer LM.     Also need to know where to fax the form.

## 2025-01-07 NOTE — TELEPHONE ENCOUNTER
Called pt back and spoke with Augustina. I advised her that  will go over the stress results with Estephania at her f/up. She expressed understanding.

## 2025-01-09 DIAGNOSIS — Z12.11 COLON CANCER SCREENING: Primary | ICD-10-CM

## 2025-01-09 RX ORDER — SODIUM, POTASSIUM,MAG SULFATES 17.5-3.13G
1 SOLUTION, RECONSTITUTED, ORAL ORAL EVERY 12 HOURS
Qty: 354 ML | Refills: 0 | Status: SHIPPED | OUTPATIENT
Start: 2025-01-09

## 2025-01-31 ENCOUNTER — DOCUMENTATION (OUTPATIENT)
Dept: SURGERY | Facility: CLINIC | Age: 55
End: 2025-01-31
Payer: MEDICARE

## 2025-01-31 NOTE — PROGRESS NOTES
You are scheduled for a colonoscopy with Dr. Beverly on 2/5/25 at 6:30am.   Arrive at outpatient surgery on the ground floor of the hospital, opposite end of the ER location. There are [facility] signs located up the hill that arrow to the surgery side.   The entire day prior to colonoscopy, you will be on a clear liquid diet from the time you wake up until midnight.   The afternoon prior to procedure you will drink a bowel prep medication to clean you out. Follow the directions for the clear liquid diet and prep that you were given in the office. A copy of those directions are listed below, in case you need them.  Do not eat/drink anything after midnight the night before your procedure, and have a  present with you on the day of procedure.   If you have any questions please call the office at [phone].        Below is a list of items that you may have. Have as much of these items as you like, just nothing after midnight!    Soft Drink Mt. Dew, Sprite, Ginger-Kate (Yellow to clear in color)   Broth Beef or Chicken   Jell-O No RED or PURPLE    Gatorade No RED or PURPLE    Popsicles No RED or PURPLE    Coffee Black ONLY (no cream or sugar)   Water Tap or Bottled   Juice Apple or White Grape     Bowel Prep Directions:  The prep is designed to clean out your colon. It will cause you to have bowel movements within a few hours. Your bowels are clean when your stools become watery and clear.    *Insurance coverage will determine if you receive Suprep or an alternate prep that is more cost efficient*    Suprep (Comes in box w/ 2 bottles and clear cup) - The evening prior to your colonoscopy you will pour 1 bottle of prep into the clear cup. Fill to cup's fill line with anything off clear liquid diet (pop, juice, Gatorade) to dilute prep and make more tolerable to drink. You have an hour to drink this. You will drink the second bottle in the same manner after the first bottle. Remember to finish the prep once  mixed within one hour. DISREGARD BOX DIRECTIONS DRINK BOTH BOTTLES THE NIGHT BEFORE COLONOSCOPY!!!!     Alternate prep (Golytley, Gavalite, etc.) The evening prior to colonoscopy you will mix product per 's directions and begin drinking it the evening before your procedure. You will drink 8 oz. every 15-20 minutes until container is empty.

## 2025-02-05 ENCOUNTER — ANESTHESIA EVENT (OUTPATIENT)
Dept: PERIOP | Facility: HOSPITAL | Age: 55
End: 2025-02-05
Payer: MEDICARE

## 2025-02-05 ENCOUNTER — APPOINTMENT (OUTPATIENT)
Dept: RESPIRATORY THERAPY | Facility: HOSPITAL | Age: 55
End: 2025-02-05
Payer: MEDICARE

## 2025-02-05 ENCOUNTER — HOSPITAL ENCOUNTER (OUTPATIENT)
Dept: CT IMAGING | Facility: HOSPITAL | Age: 55
Discharge: HOME | End: 2025-02-05
Payer: MEDICARE

## 2025-02-05 ENCOUNTER — ANESTHESIA (OUTPATIENT)
Dept: PERIOP | Facility: HOSPITAL | Age: 55
End: 2025-02-05
Payer: MEDICARE

## 2025-02-05 ENCOUNTER — HOSPITAL ENCOUNTER (OUTPATIENT)
Facility: HOSPITAL | Age: 55
Setting detail: HOSPITAL OUTPATIENT SURGERY
Discharge: HOME | End: 2025-02-05
Attending: SURGERY | Admitting: SURGERY
Payer: MEDICARE

## 2025-02-05 VITALS
WEIGHT: 106 LBS | OXYGEN SATURATION: 99 % | RESPIRATION RATE: 18 BRPM | SYSTOLIC BLOOD PRESSURE: 113 MMHG | TEMPERATURE: 97.8 F | BODY MASS INDEX: 17.04 KG/M2 | HEIGHT: 66 IN | DIASTOLIC BLOOD PRESSURE: 69 MMHG | HEART RATE: 62 BPM

## 2025-02-05 DIAGNOSIS — F17.210 CIGARETTE NICOTINE DEPENDENCE WITHOUT COMPLICATION: ICD-10-CM

## 2025-02-05 DIAGNOSIS — Z12.2 ENCOUNTER FOR SCREENING FOR LUNG CANCER: ICD-10-CM

## 2025-02-05 PROCEDURE — 25010000002 PROPOFOL 200 MG/20ML EMULSION: Performed by: NURSE ANESTHETIST, CERTIFIED REGISTERED

## 2025-02-05 PROCEDURE — 71271 CT THORAX LUNG CANCER SCR C-: CPT | Performed by: RADIOLOGY

## 2025-02-05 PROCEDURE — 71271 CT THORAX LUNG CANCER SCR C-: CPT

## 2025-02-05 PROCEDURE — G0121 COLON CA SCRN NOT HI RSK IND: HCPCS | Performed by: SURGERY

## 2025-02-05 PROCEDURE — 25010000002 PROPOFOL 10 MG/ML EMULSION: Performed by: NURSE ANESTHETIST, CERTIFIED REGISTERED

## 2025-02-05 PROCEDURE — S0260 H&P FOR SURGERY: HCPCS | Performed by: SURGERY

## 2025-02-05 RX ORDER — SODIUM CHLORIDE 0.9 % (FLUSH) 0.9 %
10 SYRINGE (ML) INJECTION EVERY 12 HOURS SCHEDULED
Status: DISCONTINUED | OUTPATIENT
Start: 2025-02-05 | End: 2025-02-05 | Stop reason: HOSPADM

## 2025-02-05 RX ORDER — ONDANSETRON 2 MG/ML
4 INJECTION INTRAMUSCULAR; INTRAVENOUS AS NEEDED
Status: DISCONTINUED | OUTPATIENT
Start: 2025-02-05 | End: 2025-02-05 | Stop reason: HOSPADM

## 2025-02-05 RX ORDER — SODIUM CHLORIDE 9 MG/ML
40 INJECTION, SOLUTION INTRAVENOUS AS NEEDED
Status: DISCONTINUED | OUTPATIENT
Start: 2025-02-05 | End: 2025-02-05 | Stop reason: HOSPADM

## 2025-02-05 RX ORDER — SODIUM CHLORIDE, SODIUM LACTATE, POTASSIUM CHLORIDE, CALCIUM CHLORIDE 600; 310; 30; 20 MG/100ML; MG/100ML; MG/100ML; MG/100ML
125 INJECTION, SOLUTION INTRAVENOUS ONCE
Status: DISCONTINUED | OUTPATIENT
Start: 2025-02-05 | End: 2025-02-05 | Stop reason: HOSPADM

## 2025-02-05 RX ORDER — OXYCODONE AND ACETAMINOPHEN 5; 325 MG/1; MG/1
1 TABLET ORAL ONCE AS NEEDED
Status: DISCONTINUED | OUTPATIENT
Start: 2025-02-05 | End: 2025-02-05 | Stop reason: HOSPADM

## 2025-02-05 RX ORDER — PROPOFOL 10 MG/ML
INJECTION, EMULSION INTRAVENOUS AS NEEDED
Status: DISCONTINUED | OUTPATIENT
Start: 2025-02-05 | End: 2025-02-05 | Stop reason: SURG

## 2025-02-05 RX ORDER — MIDAZOLAM HYDROCHLORIDE 1 MG/ML
1 INJECTION, SOLUTION INTRAMUSCULAR; INTRAVENOUS
Status: DISCONTINUED | OUTPATIENT
Start: 2025-02-05 | End: 2025-02-05 | Stop reason: HOSPADM

## 2025-02-05 RX ORDER — SODIUM CHLORIDE 0.9 % (FLUSH) 0.9 %
10 SYRINGE (ML) INJECTION AS NEEDED
Status: DISCONTINUED | OUTPATIENT
Start: 2025-02-05 | End: 2025-02-05 | Stop reason: HOSPADM

## 2025-02-05 RX ORDER — IPRATROPIUM BROMIDE AND ALBUTEROL SULFATE 2.5; .5 MG/3ML; MG/3ML
3 SOLUTION RESPIRATORY (INHALATION) ONCE AS NEEDED
Status: DISCONTINUED | OUTPATIENT
Start: 2025-02-05 | End: 2025-02-05 | Stop reason: HOSPADM

## 2025-02-05 RX ORDER — MEPERIDINE HYDROCHLORIDE 25 MG/ML
12.5 INJECTION INTRAMUSCULAR; INTRAVENOUS; SUBCUTANEOUS
Status: DISCONTINUED | OUTPATIENT
Start: 2025-02-05 | End: 2025-02-05 | Stop reason: HOSPADM

## 2025-02-05 RX ADMIN — PROPOFOL 40 MG: 10 INJECTION, EMULSION INTRAVENOUS at 07:53

## 2025-02-05 RX ADMIN — PROPOFOL 30 MG: 10 INJECTION, EMULSION INTRAVENOUS at 07:57

## 2025-02-05 RX ADMIN — PROPOFOL 40 MG: 10 INJECTION, EMULSION INTRAVENOUS at 07:54

## 2025-02-05 RX ADMIN — PROPOFOL 90 MG: 10 INJECTION, EMULSION INTRAVENOUS at 07:49

## 2025-02-05 RX ADMIN — PROPOFOL 140 MCG/KG/MIN: 10 INJECTION, EMULSION INTRAVENOUS at 07:49

## 2025-02-05 NOTE — ANESTHESIA POSTPROCEDURE EVALUATION
Patient: Estephania Lucas    Procedure Summary       Date: 02/05/25 Room / Location: [facility] OR [address] [place] [address] OR    Anesthesia Start: 0744 Anesthesia Stop: 0803    Procedure: COLONOSCOPY Diagnosis:       Colon cancer screening      (Colon cancer screening [Z12.11])    Surgeons: Zuhair Beverly MD Provider: Yair Torres MD    Anesthesia Type: general ASA Status: 3            Anesthesia Type: general    Vitals  Vitals Value Taken Time   /66 02/05/25 0809   Temp 97.8 °F (36.6 °C) 02/05/25 0804   Pulse 68 02/05/25 0809   Resp 18 02/05/25 0809   SpO2 97 % 02/05/25 0809           Post Anesthesia Care and Evaluation    Patient location during evaluation: bedside  Patient participation: complete - patient participated  Level of consciousness: awake and alert  Pain score: 1  Pain management: adequate    Airway patency: patent  Anesthetic complications: No anesthetic complications  PONV Status: none  Cardiovascular status: acceptable  Respiratory status: acceptable  Hydration status: acceptable

## 2025-02-05 NOTE — H&P
Subjective  Estephania Lucas is a 54 y.o. female is being seen for consultation today at the request of Steffanie Quinones PA     Estephania Lucas is a 54 y.o. female with history of an uncle with colon cancer who needs screening colonoscopy performed.  No change in bowel habits stool caliber.  No blood in her stools.  No unintentional weight loss.  No previous colonoscopy.  She has a history of coronary artery stenting and is on aspirin and Plavix.  No recent cardiology follow-up noted.     History of Present Illness        Medical History        Past Medical History:   Diagnosis Date    Anxiety      Coronary artery disease      Depression      GERD (gastroesophageal reflux disease)      Hypertension      MI (myocardial infarction)      PONV (postoperative nausea and vomiting)      Sleep apnea      Substance abuse                    Family History   Problem Relation Age of Onset    Hypertension Other      Breast cancer Neg Hx           Social History   Social History            Socioeconomic History    Marital status:    Tobacco Use    Smoking status: Former       Current packs/day: 1.00       Average packs/day: 1 pack/day for 25.0 years (25.0 ttl pk-yrs)       Types: Cigarettes    Smokeless tobacco: Never   Vaping Use    Vaping status: Never Used   Substance and Sexual Activity    Alcohol use: Not Currently    Drug use: Yes       Types: Marijuana, Amphetamines, Other       Comment: Last use of suboxone 22    Sexual activity: Defer            Surgical History         Past Surgical History:   Procedure Laterality Date    CARDIAC CATHETERIZATION         SECTION        CORONARY STENT PLACEMENT        ELECTROCONVULSIVE THERAPY Bilateral 2023     Procedure: ELECTROCONVULSIVE THERAPY;  Surgeon: Gautam Vergara MD;  Location: [facility];  Service: ECT;  Laterality: Bilateral;    ELECTROCONVULSIVE THERAPY Bilateral 2/3/2023     Procedure: ELECTROCONVULSIVE THERAPY;  Surgeon: Gautam Vergara  "MD [place];  Location:  [facility] OR;  Service: ECT;  Laterality: Bilateral;    ELECTROCONVULSIVE THERAPY N/A 2/6/2023     Procedure: ELECTROCONVULSIVE THERAPY;  Surgeon: Gautam Vergara MD;  Location:  [facility] OR;  Service: ECT;  Laterality: N/A;  42 seconds    ELECTROCONVULSIVE THERAPY N/A 2/8/2023     Procedure: ELECTROCONVULSIVE THERAPY;  Surgeon: Gautam Vergara MD;  Location:  [facility] OR;  Service: ECT;  Laterality: N/A;  23 seconds    ELECTROCONVULSIVE THERAPY N/A 2/10/2023     Procedure: ELECTROCONVULSIVE THERAPY;  Surgeon: Gautam Vergara MD;  Location:  [facility] OR;  Service: ECT;  Laterality: N/A;  25 seconds            Review of Systems   Constitutional:  Negative for activity change, appetite change, chills and fever.   HENT:  Negative for sore throat and trouble swallowing.    Eyes:  Negative for visual disturbance.   Respiratory:  Negative for cough and shortness of breath.    Cardiovascular:  Negative for chest pain and palpitations.   Gastrointestinal:  Negative for abdominal distention, abdominal pain, blood in stool, constipation, diarrhea, nausea and vomiting.   Endocrine: Negative for cold intolerance and heat intolerance.   Genitourinary:  Negative for dysuria.   Musculoskeletal:  Negative for joint swelling.   Skin:  Negative for color change, rash and wound.   Allergic/Immunologic: Negative for immunocompromised state.   Neurological:  Negative for dizziness, seizures, weakness and headaches.   Hematological:  Negative for adenopathy. Does not bruise/bleed easily.   Psychiatric/Behavioral:  Negative for agitation and confusion.          Results           /70 (BP Location: Left arm, Patient Position: Sitting, Cuff Size: Adult)   Ht 167.6 cm (65.98\")   Wt 49 kg (108 lb)   BMI 17.44 kg/m²      Objective[]Expand by Default  Physical Exam  Constitutional:       Appearance: She is well-developed.   HENT:      Head: Normocephalic and atraumatic.   Eyes:      " Conjunctiva/sclera: Conjunctivae normal.      Pupils: Pupils are equal, round, and reactive to light.   Neck:      Thyroid: No thyromegaly.      Vascular: No JVD.      Trachea: No tracheal deviation.   Cardiovascular:      Rate and Rhythm: Normal rate and regular rhythm.      Heart sounds: No murmur heard.     No friction rub. No gallop.   Pulmonary:      Effort: Pulmonary effort is normal.      Breath sounds: Normal breath sounds.   Abdominal:      General: There is no distension.      Palpations: Abdomen is soft. There is no hepatomegaly or splenomegaly.      Tenderness: There is no abdominal tenderness.      Hernia: No hernia is present.   Musculoskeletal:         General: No deformity. Normal range of motion.      Cervical back: Neck supple.   Skin:     General: Skin is warm and dry.   Neurological:      Mental Status: She is alert and oriented to person, place, and time.         Physical Exam        Assessment & Plan                    Assessment  Diagnoses and all orders for this visit:     1. Colon cancer screening (Primary)  -     Case Request; Standing  -     Case Request     Other orders  -     Follow Anesthesia Guidelines / Protocol; Future  -     Follow Anesthesia Guidelines / Protocol; Standing  -     Verify / Perform Chlorhexidine Skin Prep; Standing  -     Provide NPO Instructions to Patient; Future  -     Chlorhexidine Skin Prep; Future  -     Place Sequential Compression Device; Standing  -     Maintain Sequential Compression Device; Standing  -     sodium-potassium-magnesium sulfates (Suprep Bowel Prep Kit) 17.5-3.13-1.6 GM/177ML solution oral solution; Take 1 bottle by mouth Every 12 (Twelve) Hours.  Dispense: 354 mL; Refill: 0           Assessment & Plan        Estephania Lucas is a 54 y.o. female with need for screening colonoscopy.  The patient is scheduled for early December and in the interim we will obtain cardiac clearance.  Once cardiology clearance is performed we will proceed with scheduled  colonoscopy.     BMI is below normal parameters (malnutrition). Recommendations: none (medical contraindication)                This document has been electronically signed by Zuhair Beverly MD   October 30, 2024 09:52 EDT

## 2025-02-05 NOTE — ANESTHESIA PREPROCEDURE EVALUATION
Anesthesia Evaluation     Patient summary reviewed and Nursing notes reviewed   no history of anesthetic complications:   NPO Solid Status: > 8 hours  NPO Liquid Status: > 8 hours           Airway   Mallampati: II  TM distance: >3 FB  Neck ROM: full  Dental - normal exam   (+) poor dentition        Pulmonary - normal exam    breath sounds clear to auscultation  (+) ,sleep apnea  Cardiovascular - normal exam  Exercise tolerance: good (4-7 METS)    ECG reviewed  PT is on anticoagulation therapy  Rhythm: regular  Rate: normal    (+) hypertension, past MI , CAD, hyperlipidemia      Neuro/Psych  (+) psychiatric history Depression and Anxiety  GI/Hepatic/Renal/Endo    (+) GERD    Musculoskeletal (-) negative ROS    Abdominal  - normal exam   Substance History - negative use      Comment: History of Substance abuse   OB/GYN negative ob/gyn ROS         Other - negative ROS                     Anesthesia Plan    ASA 3     general   total IV anesthesia  intravenous induction     Anesthetic plan, risks, benefits, and alternatives have been provided, discussed and informed consent has been obtained with: patient.  Pre-procedure education provided  Plan discussed with CRNA.      CODE STATUS:         Lab Results   Component Value Date    WBC 8.43 01/26/2023    HGB 12.7 01/26/2023    HCT 38.0 01/26/2023    MCV 93.6 01/26/2023     01/26/2023       Lab Results   Component Value Date    GLUCOSE 134 (H) 01/27/2023    BUN 17 01/27/2023    CREATININE 0.68 01/27/2023    EGFRIFNONA 77 12/27/2019    BCR 25.0 01/27/2023    K 3.7 01/27/2023    CO2 23.9 01/27/2023    CALCIUM 8.7 01/27/2023    ALBUMIN 3.5 01/27/2023    AST 12 01/27/2023    ALT 10 01/27/2023

## 2025-02-12 ENCOUNTER — OFFICE VISIT (OUTPATIENT)
Dept: CARDIOLOGY | Facility: CLINIC | Age: 55
End: 2025-02-12
Payer: MEDICARE

## 2025-02-12 VITALS
RESPIRATION RATE: 18 BRPM | SYSTOLIC BLOOD PRESSURE: 143 MMHG | HEART RATE: 76 BPM | WEIGHT: 111.6 LBS | DIASTOLIC BLOOD PRESSURE: 78 MMHG | HEIGHT: 66 IN | OXYGEN SATURATION: 100 % | BODY MASS INDEX: 17.94 KG/M2

## 2025-02-12 DIAGNOSIS — I25.10 CORONARY ARTERY DISEASE INVOLVING NATIVE CORONARY ARTERY OF NATIVE HEART WITHOUT ANGINA PECTORIS: Primary | ICD-10-CM

## 2025-02-12 DIAGNOSIS — F17.200 SMOKING: ICD-10-CM

## 2025-02-12 DIAGNOSIS — R06.09 DOE (DYSPNEA ON EXERTION): ICD-10-CM

## 2025-02-12 DIAGNOSIS — Z82.49 FAMILY HISTORY OF CORONARY ARTERY DISEASE: ICD-10-CM

## 2025-02-12 LAB
AV MEAN PRESS GRAD SYS DOP V1V2: 2 MMHG
AV VMAX SYS DOP: 93.3 CM/SEC
BH CV ECHO MEAS - ACS: 1.7 CM
BH CV ECHO MEAS - AO MAX PG: 3.5 MMHG
BH CV ECHO MEAS - AO ROOT DIAM: 3.3 CM
BH CV ECHO MEAS - AO V2 VTI: 19.4 CM
BH CV ECHO MEAS - AVA(I,D): 2.6 CM2
BH CV ECHO MEAS - EDV(CUBED): 46.7 ML
BH CV ECHO MEAS - EDV(MOD-SP2): 44.8 ML
BH CV ECHO MEAS - EDV(MOD-SP4): 60.5 ML
BH CV ECHO MEAS - EF(MOD-SP2): 61.4 %
BH CV ECHO MEAS - EF(MOD-SP4): 63.1 %
BH CV ECHO MEAS - ESV(CUBED): 13.8 ML
BH CV ECHO MEAS - ESV(MOD-SP2): 17.3 ML
BH CV ECHO MEAS - ESV(MOD-SP4): 22.3 ML
BH CV ECHO MEAS - FS: 33.3 %
BH CV ECHO MEAS - IVS/LVPW: 0.88 CM
BH CV ECHO MEAS - IVSD: 0.7 CM
BH CV ECHO MEAS - LA DIMENSION: 1.9 CM
BH CV ECHO MEAS - LAT PEAK E' VEL: 11 CM/SEC
BH CV ECHO MEAS - LV DIASTOLIC VOL/BSA (35-75): 39.5 CM2
BH CV ECHO MEAS - LV MASS(C)D: 72.1 GRAMS
BH CV ECHO MEAS - LV MAX PG: 2.6 MMHG
BH CV ECHO MEAS - LV MEAN PG: 1 MMHG
BH CV ECHO MEAS - LV SYSTOLIC VOL/BSA (12-30): 14.5 CM2
BH CV ECHO MEAS - LV V1 MAX: 80.9 CM/SEC
BH CV ECHO MEAS - LV V1 VTI: 16.3 CM
BH CV ECHO MEAS - LVIDD: 3.6 CM
BH CV ECHO MEAS - LVIDS: 2.4 CM
BH CV ECHO MEAS - LVOT AREA: 3.1 CM2
BH CV ECHO MEAS - LVOT DIAM: 2 CM
BH CV ECHO MEAS - LVPWD: 0.8 CM
BH CV ECHO MEAS - MED PEAK E' VEL: 9 CM/SEC
BH CV ECHO MEAS - MR MAX PG: 20.6 MMHG
BH CV ECHO MEAS - MR MAX VEL: 227 CM/SEC
BH CV ECHO MEAS - MV A MAX VEL: 92.1 CM/SEC
BH CV ECHO MEAS - MV DEC SLOPE: 315 CM/SEC2
BH CV ECHO MEAS - MV DEC TIME: 0.25 SEC
BH CV ECHO MEAS - MV E MAX VEL: 78.4 CM/SEC
BH CV ECHO MEAS - MV E/A: 0.85
BH CV ECHO MEAS - MV MAX PG: 3.9 MMHG
BH CV ECHO MEAS - MV MEAN PG: 2 MMHG
BH CV ECHO MEAS - MV V2 VTI: 20.2 CM
BH CV ECHO MEAS - MVA(VTI): 2.5 CM2
BH CV ECHO MEAS - PA ACC TIME: 0.17 SEC
BH CV ECHO MEAS - PA V2 MAX: 90.4 CM/SEC
BH CV ECHO MEAS - RAP SYSTOLE: 10 MMHG
BH CV ECHO MEAS - RVSP: 21.7 MMHG
BH CV ECHO MEAS - SV(LVOT): 51.2 ML
BH CV ECHO MEAS - SV(MOD-SP2): 27.5 ML
BH CV ECHO MEAS - SV(MOD-SP4): 38.2 ML
BH CV ECHO MEAS - SVI(LVOT): 33.4 ML/M2
BH CV ECHO MEAS - SVI(MOD-SP2): 17.9 ML/M2
BH CV ECHO MEAS - SVI(MOD-SP4): 24.9 ML/M2
BH CV ECHO MEAS - TAPSE (>1.6): 2.32 CM
BH CV ECHO MEAS - TR MAX PG: 11.7 MMHG
BH CV ECHO MEAS - TR MAX VEL: 171 CM/SEC
BH CV ECHO MEASUREMENTS AVERAGE E/E' RATIO: 7.84
LV EF BIPLANE MOD: 63.3 %

## 2025-02-12 NOTE — PROGRESS NOTES
Steffanie Quinones PA  No ref. provider found    Estephania Lucas  1970  02/12/2025    Subjective     Estephania Lucas is a 54 y.o. female who presents today to [facility]ARD[facility]OLOG[facility] for Coronary artery disease involving native coronary artery of.  History of Present Illness  The patient presents for follow-up visit.    History of myocardial infarction managed with stent placement at [facility] in 02/2023. No current chest pain. Current medications: Plavix and Lipitor.  Had echocardiogram showing normal ejection fraction with no significant valvular abnormalities.  Nuclear stress test without any evidence of ischemia.    Underwent low-dose CT chest for shortness of breath; which is showing 8.6 mm left upper lobe partially calcified nodule. PET/CT scan is recommended. Follow-up with pulmonology on 03/06/2025 is scheduled.     No diabetes. Normal recent colonoscopy; repeat in 10 years is advised.    No elevated blood pressure readings at home; today's high reading attributed to anxiety.    She quit smoking. Her mother had lung cancer and quit smoking. Her father also had lung cancer and was a smoker.          No Known Allergies:    Current Outpatient Medications:     albuterol sulfate  (90 Base) MCG/ACT inhaler, Inhale 2 puffs Every 4 (Four) Hours As Needed for Wheezing or Shortness of Air., Disp: 18 g, Rfl: 5    atorvastatin (LIPITOR) 10 MG tablet, Take 1 tablet by mouth Daily., Disp: 30 tablet, Rfl: 11    clopidogrel (PLAVIX) 75 MG tablet, Take 1 tablet by mouth Daily. Indications: Cerebrovascular Accident or Stroke, Disp: , Rfl:     mirtazapine (REMERON SOL-TAB) 30 MG disintegrating tablet, Place 1 tablet on the tongue Every Night. Indications: Major Depressive Disorder, Disp: , Rfl:     PARoxetine (PAXIL) 30 MG tablet, Take 1 tablet by mouth Every Morning., Disp: , Rfl:     sodium-potassium-magnesium sulfates (Suprep Bowel Prep Kit) 17.5-3.13-1.6 GM/177ML solution oral  solution, Take 1 bottle by mouth Every 12 (Twelve) Hours. (Patient not taking: Reported on 2025), Disp: 354 mL, Rfl: 0    Past Medical History:   Diagnosis Date    Anxiety     Coronary artery disease     Depression     Elevated cholesterol     GERD (gastroesophageal reflux disease)     Hypertension     MI (myocardial infarction)     PONV (postoperative nausea and vomiting)     Sleep apnea     Substance abuse      Past Surgical History:   Procedure Laterality Date    CARDIAC CATHETERIZATION       SECTION      COLONOSCOPY N/A 2025    Procedure: COLONOSCOPY;  Surgeon: Zuhair Beverly MD;  Location: [facility] OR;  Service: Gastroenterology;  Laterality: N/A;    CORONARY STENT PLACEMENT      ELECTROCONVULSIVE THERAPY Bilateral 2023    Procedure: ELECTROCONVULSIVE THERAPY;  Surgeon: Gautam Vergara MD;  Location: [facility] OR;  Service: ECT;  Laterality: Bilateral;    ELECTROCONVULSIVE THERAPY Bilateral 2/3/2023    Procedure: ELECTROCONVULSIVE THERAPY;  Surgeon: Gautam Vergara MD;  Location: [facility] OR;  Service: ECT;  Laterality: Bilateral;    ELECTROCONVULSIVE THERAPY N/A 2023    Procedure: ELECTROCONVULSIVE THERAPY;  Surgeon: Gautam Vergara MD;  Location: [facility] OR;  Service: ECT;  Laterality: N/A;  42 seconds    ELECTROCONVULSIVE THERAPY N/A 2023    Procedure: ELECTROCONVULSIVE THERAPY;  Surgeon: Gautam Vergara MD;  Location: [facility] OR;  Service: ECT;  Laterality: N/A;  23 seconds    ELECTROCONVULSIVE THERAPY N/A 2/10/2023    Procedure: ELECTROCONVULSIVE THERAPY;  Surgeon: Gautam Vergara MD;  Location: [facility] OR;  Service: ECT;  Laterality: N/A;  25 seconds     Family History   Problem Relation Age of Onset    Heart disease Mother     Lung cancer Mother     Heart disease Brother     Hypertension Other     Breast cancer Neg Hx      Social History     Tobacco Use    Smoking status: Every Day     Current packs/day: 1.00     Average  "packs/day: 1 pack/day for 25.0 years (25.0 ttl pk-yrs)     Types: Cigarettes    Smokeless tobacco: Never    Tobacco comments:     About 4-5 a day   Vaping Use    Vaping status: Never Used   Substance Use Topics    Alcohol use: Not Currently    Drug use: Not Currently     Types: Marijuana, Amphetamines, Other     Comment: Last use of suboxone 12/12/22       Objective   Blood pressure 143/78, pulse 76, resp. rate 18, height 167.6 cm (66\"), weight 50.6 kg (111 lb 9.6 oz), SpO2 100%.  Body mass index is 18.01 kg/m².    Constitutional:       Appearance: Not in distress.   Pulmonary:      Effort: Pulmonary effort is normal.      Breath sounds: Normal breath sounds.   Cardiovascular:      Normal rate. Regular rhythm. Normal S1. Normal S2.    Edema:     Peripheral edema absent.   Skin:     General: Skin is warm.           DATA:   Results for orders placed during the hospital encounter of 12/13/24    Adult Transthoracic Echo Complete W/ Cont if Necessary Per Protocol    Interpretation Summary    Left ventricular systolic function is normal. Calculated left ventricular EF = 63.3%    Left ventricular diastolic function is consistent with (grade I) impaired relaxation.    Estimated right ventricular systolic pressure from tricuspid regurgitation is normal (<35 mmHg).    Normal right ventricular cavity size and systolic function noted.    The aortic valve exhibits sclerosis. The aortic valve appears trileaflet.    Moderate mitral annular calcification is present. Trace mitral valve regurgitation is present.    There is no evidence of pericardial effusion.   Results for orders placed during the hospital encounter of 12/13/24    Stress Test With Myocardial Perfusion One Day    Interpretation Summary  Images from the original result were not included.      Normal ECG with no significant stress induced changes noted.    Myocardial perfusion imaging indicates a normal myocardial perfusion study with no evidence of ischemia. " "Impressions are consistent with a low risk study.    Left ventricular ejection fraction is hyperdynamic (Calculated EF > 70%).     Results for orders placed during the hospital encounter of 06/28/23    US Abdomen Complete    Narrative  EXAM:  US Abdomen Complete    EXAM DATE:  6/28/2023 8:20 AM    CLINICAL HISTORY:  Right upper quadrant pain; R10.11-Right upper quadrant pain    TECHNIQUE:  Real-time ultrasound of the abdomen with image documentation.    COMPARISON:  No relevant prior studies available.    FINDINGS:  Liver:  Unremarkable.  No mass.  No intrahepatic bile duct dilation.  Gallbladder:  Unremarkable.  No gallstones.  Common bile duct:  The CBD measures 3 mm  .  No stones.  No dilation.  Pancreas:  Unremarkable as visualized.  Kidneys:  Right kidney is 9.8 x 3.1 cm.  Left kidney is 8.8 x 4.5 cm.  No stones.  No hydronephrosis.  Spleen:  Spleen is 9.0 cm.  Aorta:  Unremarkable.  No aneurysm.  Inferior vena cava:  Unremarkable.    Impression  Unremarkable exam.    This report was finalized on 6/28/2023 8:49 AM by Dr. Yayo Valles MD.      No results found for: \"BNP\"  No results found for: \"PSA\"   Lab Results   Component Value Date    MG 1.8 01/26/2023     No results found for: \"INR\"  No results found for: \"CKTOTAL\"  No results found for: \"CHOL\", \"CHLPL\"  No results found for: \"TRIG\"  No results found for: \"HDL\"  No results found for: \"LDL\", \"LDLDIRECT\"  No components found for: \"A1C\"      Lab Results   Component Value Date    TSH 1.550 01/26/2023             Invalid input(s): \"LABALBU\", \"PROT\"        Results review: During today's encounter, all relevant clinical data has been reviewed.      Procedures    Assessment & Plan    Diagnosis Plan   1. Coronary artery disease involving native coronary artery of native heart without angina pectoris        2. Smoking        3. CROW (dyspnea on exertion)        4. Family history of coronary artery disease          Assessment & Plan    1.  History of myocardial " infarction status post PCI with unknown anatomy at [facility] in 2/20/2023.  Previously seen for dyspnea on exertion  Echocardiogram: normal left-sided function (EF ~60%). Stress test: no significant ischemia. Continue Plavix and Lipitor. Maintain blood pressure control, adhere to cholesterol medication, and abstain from smoking to mitigate future myocardial infarction or stroke risks.  -Continue Lipitor 10 mg p.o. once daily and Plavix 75 mg p.o. once daily.  Goal of LDL is less than 70.   -Dyspnea on exertion unlikely to be cardiac in origin.     2. Pulmonary nodule.  8 mm calcified nodule in left upper lobe on CT. Recommended PET scan or high-resolution imaging by the radiologist. Follow-up with pulmonology on 03/06/2025 to discuss results and potential biopsy.  -Patient reports family history of lung cancer in both parents    3. Elevated blood pressure.  Slightly elevated during visit, attributed to nervousness.  Reports normal blood pressure readings at home..      Recommendations  No orders of the defined types were placed in this encounter.        New Medications:   No orders of the defined types were placed in this encounter.      Discontinued Medications:   There are no discontinued medications.     Return in about 6 weeks (around 3/26/2025).    Patient or patient representative verbalized consent for the use of Ambient Listening during the visit with  aSranya Shin MD for chart documentation. 2/12/2025  16:32 EST      Thank you for allowing me to participate in the care of Estephanai Lucas. Feel free to contact me directly with any further questions or concerns.        This document has been electronically signed by Saranya Shin MD   February 12, 2025 16:32 EST    Dictated Utilizing Dragon Dictation: Part of this note may be an electronic transcription/translation of spoken language to printed text using the Dragon Dictation System.

## 2025-02-19 DIAGNOSIS — R91.1 PULMONARY NODULE: Primary | ICD-10-CM

## 2025-02-19 NOTE — PROGRESS NOTES
Discussed CT Chest with the patient's sister Augustina.  CT Chest is notable for an 8.6 mm pulmonary nodule.  We discussed the option of doing a PET/CT or waiting 3 months and doing a follow up scan. Given that the patient's mother had lung cancer they would like to proceed with PET/CT.

## 2025-02-21 ENCOUNTER — HOSPITAL ENCOUNTER (OUTPATIENT)
Dept: RESPIRATORY THERAPY | Facility: HOSPITAL | Age: 55
Discharge: HOME | End: 2025-02-21
Payer: MEDICARE

## 2025-02-21 VITALS — HEART RATE: 92 BPM | RESPIRATION RATE: 12 BRPM | OXYGEN SATURATION: 98 %

## 2025-02-21 DIAGNOSIS — R06.02 SHORTNESS OF BREATH: ICD-10-CM

## 2025-02-21 PROCEDURE — 94060 EVALUATION OF WHEEZING: CPT

## 2025-02-21 PROCEDURE — 94640 AIRWAY INHALATION TREATMENT: CPT

## 2025-02-21 PROCEDURE — 94726 PLETHYSMOGRAPHY LUNG VOLUMES: CPT

## 2025-02-21 PROCEDURE — 94799 UNLISTED PULMONARY SVC/PX: CPT

## 2025-02-21 PROCEDURE — 94760 N-INVAS EAR/PLS OXIMETRY 1: CPT

## 2025-02-21 PROCEDURE — 94729 DIFFUSING CAPACITY: CPT

## 2025-02-21 RX ORDER — ALBUTEROL SULFATE 0.83 MG/ML
2.5 SOLUTION RESPIRATORY (INHALATION) ONCE
Status: COMPLETED | OUTPATIENT
Start: 2025-02-21 | End: 2025-02-21

## 2025-02-21 RX ADMIN — ALBUTEROL SULFATE 2.5 MG: 2.5 SOLUTION RESPIRATORY (INHALATION) at 09:20

## 2025-03-18 ENCOUNTER — TELEPHONE (OUTPATIENT)
Dept: PULMONOLOGY | Facility: CLINIC | Age: 55
End: 2025-03-18
Payer: MEDICARE

## 2025-03-18 NOTE — TELEPHONE ENCOUNTER
Attempted to call patient about PFT results.  The number in her chart is that is listed is a wrong number.

## 2025-03-24 DIAGNOSIS — R91.1 PULMONARY NODULE: Primary | ICD-10-CM

## 2025-04-09 ENCOUNTER — HOSPITAL ENCOUNTER (OUTPATIENT)
Dept: PET IMAGING | Facility: HOSPITAL | Age: 55
Discharge: HOME | End: 2025-04-09
Admitting: NURSE PRACTITIONER
Payer: MEDICARE

## 2025-04-09 DIAGNOSIS — R91.1 PULMONARY NODULE: ICD-10-CM

## 2025-04-09 PROCEDURE — A9552 F18 FDG: HCPCS | Performed by: NURSE PRACTITIONER

## 2025-04-09 PROCEDURE — 78815 PET IMAGE W/CT SKULL-THIGH: CPT | Performed by: RADIOLOGY

## 2025-04-09 PROCEDURE — 34310000005 FLUDEOXYGLUCOSE F18 SOLUTION: Performed by: NURSE PRACTITIONER

## 2025-04-09 PROCEDURE — 78815 PET IMAGE W/CT SKULL-THIGH: CPT

## 2025-04-09 RX ADMIN — FLUDEOXYGLUCOSE F 18 1 DOSE: 200 INJECTION, SOLUTION INTRAVENOUS at 14:25

## 2025-04-10 ENCOUNTER — OFFICE VISIT (OUTPATIENT)
Dept: PULMONOLOGY | Facility: CLINIC | Age: 55
End: 2025-04-10
Payer: MEDICARE

## 2025-04-10 VITALS
HEIGHT: 66 IN | DIASTOLIC BLOOD PRESSURE: 78 MMHG | BODY MASS INDEX: 18.45 KG/M2 | WEIGHT: 114.8 LBS | OXYGEN SATURATION: 96 % | TEMPERATURE: 97.4 F | HEART RATE: 87 BPM | SYSTOLIC BLOOD PRESSURE: 130 MMHG

## 2025-04-10 DIAGNOSIS — R91.1 PULMONARY NODULE: Primary | ICD-10-CM

## 2025-04-10 DIAGNOSIS — R06.02 SHORTNESS OF BREATH: ICD-10-CM

## 2025-04-10 DIAGNOSIS — F17.211 CIGARETTE NICOTINE DEPENDENCE IN REMISSION: ICD-10-CM

## 2025-04-10 NOTE — PROGRESS NOTES
"Chief Complaint  Shortness of Breath    Subjective          Estephania Lucas presents to [facility] for   History of Present Illness    Ms. Lucas is a 55 year old female with a medical history significant for CAD, hypertension, anxiety, depression, GERD, and sleep apnea.    She presents today for follow up on shortness of breath.  She reports that she has been doing well since her last visit. She states that she is using albuterol as needed and typically uses it a couple times a month.  She reports that she quit smoking about 2 months ago.      Objective   Vital Signs:   /78   Pulse 87   Temp 97.4 °F (36.3 °C)   Ht 167.6 cm (65.98\")   Wt 52.1 kg (114 lb 12.8 oz)   SpO2 96%   BMI 18.54 kg/m²         Physical Exam    GENERAL APPEARANCE: Well developed, well nourished, alert and cooperative, and appears to be in no acute distress.    HEAD: normocephalic. Atraumatic.    EYES: PERRL, EOMI. Vision is grossly intact.    THROAT: Oral cavity and pharynx normal. No inflammation, swelling, exudate, or lesions.     NECK: Neck supple.  No thyromegaly.    CARDIAC: Normal S1 and S2. No S3, S4 or murmurs. Rhythm is regular.     RESPIRATORY:Bilateral air entry positive. No wheezing, crackles or rhonchi noted.    GI: Positive bowel sounds. Soft, nondistended, nontender.     MUSCULOSKELETAL: No significant deformity or joint abnormality. No edema. Peripheral pulses intact. No varicosities.    NEUROLOGICAL: Strength and sensation symmetric and intact throughout.     PSYCHIATRIC: The mental examination revealed the patient was oriented to person, place, and time.       Estimated body mass index is 18.54 kg/m² as calculated from the following:    Height as of this encounter: 167.6 cm (65.98\").    Weight as of this encounter: 52.1 kg (114 lb 12.8 oz).        Result Review :   The following data was reviewed by: ZA Thompson on 04/10/2025:     "     PFT:25    Moderate obstructive lung disease with significant bronchodilator effect seen on this occasion.  Diffusion capacity is normal.  Lung volumes are normal.  Flow volume loop is obstructed.       Low dose lung cancer screenin25    FINDINGS:    LIMITATIONS:  Please note that reported measurements are made  manually, as computer generated (AI) measurements can over measure  lesions. Additionally, lesions identified by AI may have been present  presently, significant nodules will be discussed in the report and the  visual depictions of lesions provided by AI are for general reference  only.    LUNGS AND PLEURAL SPACES:  Left upper lobe probably partially  calcified nodule measuring 8.6 mm.  No consolidation.  No pneumothorax.   No significant effusion.    HEART:  Unremarkable as visualized.   No significant pericardial  effusion.  Moderate coronary artery calcifications.    BONES/JOINTS:  Unremarkable as visualized.  No acute fracture.  No  dislocation.    SOFT TISSUES:  Unremarkable as visualized.    VASCULATURE:  Unremarkable as visualized.  No thoracic aortic  aneurysm.    LYMPH NODES:  Subpleural lymph node left upper lobe partially  calcified measuring about 4 mm.    OTHER FINDINGS:  PET/CT recommended.     IMPRESSION:  1.  Left upper lobe probably partially calcified nodule measuring 8.6  mm.  2.  PET/CT recommended.     ASSESSMENT:    Lung RADS 4A     This report was finalized on 2025 9:22 AM by Dr. Neeraj Griggs MD.    Previous chest imaging:    PET/CT 25    FINDINGS:      HEAD:    Brain and extra-axial spaces:  Unremarkable as visualized.    Nasopharynx:  Unremarkable.      NECK:    Hypopharynx:  Unremarkable.    Larynx:  Unremarkable.    Trachea:  Unremarkable.    Retropharyngeal space:  Unremarkable.    Submandibular/parotid glands:  Unremarkable.  Glands are normal in  size.    Thyroid:  Unremarkable.  No enlarged or calcified nodules.      CHEST:    Lungs and pleural spaces:   "Calcified nodule left upper lobe which is  approximately 9 mm demonstrates no abnormal FDG hypermetabolism.  No  consolidation.  No significant effusion.  No pneumothorax.  No FDG  hypermetabolic pulmonary nodules are identified.    Heart:  Mild coronary artery calcifications.  No cardiomegaly.  No  significant pericardial effusion.    Mediastinum:  Unremarkable.  No mass.      ABDOMEN:    Liver:  Unremarkable.    Gallbladder and bile ducts:  Unremarkable.  No calcified stones.  No  ductal dilation.    Pancreas:  Unremarkable.  No ductal dilation.    Spleen:  Unremarkable.  No splenomegaly.    Adrenals:  Unremarkable.  No mass.    Kidneys and ureters:  Unremarkable.    Stomach and bowel:  Unremarkable.  No obstruction.      PELVIS:    Appendix:  No findings to suggest acute appendicitis.    Bladder:  Unremarkable.    Reproductive:  Unremarkable as visualized.      WHOLE BODY:    Intraperitoneal space:  Unremarkable.  No significant fluid  collection.  No free air.    Bones/joints:  Unremarkable.  No acute fracture.  No dislocation.    Soft tissues:  Unremarkable.    Vasculature:  Unremarkable.  No aortic aneurysm.    Lymph nodes:  Unremarkable.  No lymphadenopathy.  No hypermetabolic  activity.     IMPRESSION:  1.  No FDG hypermetabolic pulmonary nodules are identified.  2.  Calcified nodule left upper lobe which is approximately 9 mm  demonstrates no abnormal FDG hypermetabolism.  3. Otherwise unremarkable exam. Other nonacute findings as above.     This report was finalized on 4/9/2025 8:14 PM by Dr. Yayo Valles MD.          Alpha-1 antitrypsin screening:NA    STOP-Bang Score:   NA  Sabael Sleepiness Scale:   NA      ABG:    pH No results found for: \"PHART\"   pO2 No results found for: \"PO2ART\"   pCO2 No results found for: \"XGC7GRL\"   HCO3 No results found for: \"IDP3RII\"                      Assessment and Plan    Problem List Items Addressed This Visit    None  Visit Diagnoses         Pulmonary nodule    -  " Primary    Relevant Orders    CT Chest Without Contrast      Cigarette nicotine dependence in remission          Shortness of breath                Estephania Lucas  reports that she has quit smoking. Her smoking use included cigarettes. She has a 25 pack-year smoking history. She has never used smokeless tobacco.   She quit smoking about 2 months ago.    PFT showed moderate obstruction with significant bronchodilator response.  She likely has some underlying asthma.  Will continue albuterol inhaler as needed.      Low dose CT chest showed an 8.6 mm nodule. This was followed up on with a PET/CT.  PET/CT showed no hypermetabolic activity of this pulmonary nodule.  We will plan to repeat CT Chest in 6 months.      Follow Up   Return in about 6 months (around 10/10/2025).  Patient was given instructions and counseling regarding her condition or for health maintenance advice. Please see specific information pulled into the AVS if appropriate.

## (undated) DEVICE — 133 FOAM ELECTRODES,CONDUCTIVE ADHESIVE HYDROGEL: Brand: KENDALL

## (undated) DEVICE — ELECTRODE,FOAM,MONITORING,SLD GEL,5 PK: Brand: MEDLINE

## (undated) DEVICE — Device: Brand: DEFENDO AIR/WATER/SUCTION AND BIOPSY VALVE

## (undated) DEVICE — ENDOGATOR AUXILIARY WATER JET CONNECTOR: Brand: ENDOGATOR

## (undated) DEVICE — CONN Y IRR DISP 1P/U

## (undated) DEVICE — Device

## (undated) DEVICE — PCH SURG STRL INST SLF/SEAL 7.5X13IN

## (undated) DEVICE — ENDOGATOR TUBING FOR ENDOGATOR EGP-100 IRRIGATION PUMP,OLYMPUS OFP PUMP, OLYMPUS AFU-100 PUMP AND ERBE EIP2 PUMP: Brand: ENDOGATOR